# Patient Record
Sex: FEMALE | Race: WHITE | NOT HISPANIC OR LATINO | Employment: FULL TIME | URBAN - METROPOLITAN AREA
[De-identification: names, ages, dates, MRNs, and addresses within clinical notes are randomized per-mention and may not be internally consistent; named-entity substitution may affect disease eponyms.]

---

## 2018-02-12 ENCOUNTER — OFFICE VISIT (OUTPATIENT)
Dept: FAMILY MEDICINE CLINIC | Facility: CLINIC | Age: 28
End: 2018-02-12
Payer: COMMERCIAL

## 2018-02-12 VITALS
OXYGEN SATURATION: 97 % | DIASTOLIC BLOOD PRESSURE: 74 MMHG | WEIGHT: 172 LBS | HEIGHT: 68 IN | HEART RATE: 80 BPM | BODY MASS INDEX: 26.07 KG/M2 | SYSTOLIC BLOOD PRESSURE: 112 MMHG

## 2018-02-12 DIAGNOSIS — Z00.00 WELL ADULT EXAM: Primary | ICD-10-CM

## 2018-02-12 DIAGNOSIS — Z12.4 PAP SMEAR FOR CERVICAL CANCER SCREENING: ICD-10-CM

## 2018-02-12 PROBLEM — J45.20 MILD INTERMITTENT ASTHMA WITHOUT COMPLICATION: Chronic | Status: ACTIVE | Noted: 2018-02-12

## 2018-02-12 PROBLEM — R12 HEARTBURN SYMPTOM: Chronic | Status: ACTIVE | Noted: 2018-02-12

## 2018-02-12 PROCEDURE — 99385 PREV VISIT NEW AGE 18-39: CPT | Performed by: PHYSICIAN ASSISTANT

## 2018-02-12 RX ORDER — OMEPRAZOLE 10 MG/1
10 CAPSULE, DELAYED RELEASE ORAL DAILY
COMMUNITY
End: 2018-12-05

## 2018-02-12 NOTE — PROGRESS NOTES
Assessment/Plan:       Diagnoses and all orders for this visit:    Well adult exam  -     Comprehensive metabolic panel    Pap smear for cervical cancer screening    Other orders  -     omeprazole (PriLOSEC) 10 mg delayed release capsule; Take 10 mg by mouth daily            Subjective:      Patient ID: Liliam Lambetr is a 24 y o  female  Well Adult Physical   Patient here for a comprehensive physical exam The patient reports no problems    Do you take any herbs or supplements that were not prescribed by a doctor? no   Are you taking calcium supplements? no   Are you taking aspirin daily? no     History:  Any STD's in the past? None  Patient needs a Pap smear  She will make appointment in our office to do so  Patient has an IUD for birth control  She notes that she does not get any periods but she does have some cramping with the IUD  The following portions of the patient's history were reviewed and updated as appropriate:   She has no past medical history on file  ,   does not have any pertinent problems on file  ,   has no past surgical history on file  ,  family history includes Anxiety disorder in her maternal uncle; Asthma in her mother; Bipolar disorder in her mother; Cancer in her maternal grandfather; Depression in her maternal uncle and sister; Diabetes in her paternal aunt; Post-traumatic stress disorder in her mother  ,   reports that she has quit smoking  She has never used smokeless tobacco  She reports that she drinks alcohol  She reports that she uses drugs, including Marijuana  ,  has No Known Allergies     Current Outpatient Prescriptions   Medication Sig Dispense Refill    omeprazole (PriLOSEC) 10 mg delayed release capsule Take 10 mg by mouth daily      PROAIR  (90 Base) MCG/ACT inhaler inhale 1 puff by mouth every 6 hours if needed  0     No current facility-administered medications for this visit  Review of Systems   Constitutional: Negative  HENT: Negative      Eyes: Negative  Respiratory: Negative  Cardiovascular: Negative  Gastrointestinal: Negative  Endocrine: Negative  Genitourinary: Negative  Musculoskeletal: Negative  Allergic/Immunologic: Negative  Neurological: Negative  Hematological: Negative  Psychiatric/Behavioral: Negative  Objective:  Vitals:    02/12/18 1028   BP: 112/74   Pulse: 80   SpO2: 97%      Physical Exam   Constitutional: She is oriented to person, place, and time  She appears well-developed and well-nourished  HENT:   Head: Normocephalic and atraumatic  Right Ear: External ear normal    Left Ear: External ear normal    Nose: Nose normal    Mouth/Throat: Oropharynx is clear and moist    Eyes: Conjunctivae and EOM are normal  Pupils are equal, round, and reactive to light  No scleral icterus  Neck: Normal range of motion  Neck supple  No thyromegaly present  Cardiovascular: Normal rate, regular rhythm, normal heart sounds and intact distal pulses  No murmur heard  Pulmonary/Chest: Effort normal and breath sounds normal    Abdominal: Bowel sounds are normal  She exhibits no mass  Musculoskeletal: Normal range of motion  Lymphadenopathy:     She has no cervical adenopathy  Neurological: She is oriented to person, place, and time  She has normal reflexes  Skin: Skin is warm and dry  Psychiatric: She has a normal mood and affect   Her behavior is normal  Judgment and thought content normal

## 2018-02-12 NOTE — PATIENT INSTRUCTIONS

## 2018-06-05 ENCOUNTER — OFFICE VISIT (OUTPATIENT)
Dept: OBGYN CLINIC | Age: 28
End: 2018-06-05
Payer: COMMERCIAL

## 2018-06-05 VITALS
HEIGHT: 69 IN | DIASTOLIC BLOOD PRESSURE: 84 MMHG | BODY MASS INDEX: 24.88 KG/M2 | SYSTOLIC BLOOD PRESSURE: 128 MMHG | WEIGHT: 168 LBS

## 2018-06-05 DIAGNOSIS — Z11.3 SCREENING FOR STDS (SEXUALLY TRANSMITTED DISEASES): ICD-10-CM

## 2018-06-05 DIAGNOSIS — R10.2 PELVIC PAIN: ICD-10-CM

## 2018-06-05 DIAGNOSIS — Z30.432 ENCOUNTER FOR IUD REMOVAL: Primary | ICD-10-CM

## 2018-06-05 DIAGNOSIS — Z30.015 ENCOUNTER FOR INITIAL PRESCRIPTION OF VAGINAL RING HORMONAL CONTRACEPTIVE: ICD-10-CM

## 2018-06-05 PROCEDURE — 87591 N.GONORRHOEAE DNA AMP PROB: CPT | Performed by: NURSE PRACTITIONER

## 2018-06-05 PROCEDURE — 87491 CHLMYD TRACH DNA AMP PROBE: CPT | Performed by: NURSE PRACTITIONER

## 2018-06-05 PROCEDURE — 58301 REMOVE INTRAUTERINE DEVICE: CPT | Performed by: NURSE PRACTITIONER

## 2018-06-05 RX ORDER — ETONOGESTREL AND ETHINYL ESTRADIOL 11.7; 2.7 MG/1; MG/1
INSERT, EXTENDED RELEASE VAGINAL
Qty: 1 EACH | Refills: 2 | Status: SHIPPED | OUTPATIENT
Start: 2018-06-05 | End: 2018-10-18 | Stop reason: ALTCHOICE

## 2018-06-05 NOTE — PROGRESS NOTES
Iud removal  Date/Time: 6/5/2018 4:31 PM  Performed by: Leopoldo Ransom by: Jensen Whittaker     Consent:     Consent obtained:  Verbal (Discussed other forms of bcm and pt would like to try NR  Inserted one in office by herself  Will call if does well  Also agrees to STD testing bc hasn't been checked in 2 yrs  Same partner for 2 yrs  Still is adament about IUD being removed   ER visit 5/25)    Consent given by:  Patient    Procedure risks and benefits discussed: yes      Patient questions answered: yes      Patient agrees, verbalizes understanding, and wants to proceed: yes      Educational handouts given: no      Instructions and paperwork completed: yes    Procedure:     Removed with no complications: yes      Removal due to mechanical complications of IUD: no      Removal due to infection and inflammatory reaction: no      Other reason for removal:  Pelvic pain and cramps

## 2018-06-05 NOTE — PATIENT INSTRUCTIONS
Vaginal Ring   WHAT YOU NEED TO KNOW:   What is a vaginal ring? A vaginal ring is a small flexible ring that contains medicine  The vaginal ring may be used to prevent pregnancy  It may also be used to treat symptoms, such as vaginal dryness and itching, that occur with menopause  What do I need to know about the vaginal ring before I use it? You need to see your healthcare provider to get a prescription  Tell your healthcare provider if you are allergic to any medicine  You should not use this medicine if you are breastfeeding or you have vaginal bleeding that has not been checked by a doctor  You should not use the vaginal ring if you have certain medical conditions, such as breast cancer, endometrial cancer, or liver or heart disease  Other medical conditions include severe high blood pressure, circulation problems, and previous stroke or blood clot  You also should not use the vaginal ring if you are a heavy smoker and over the age of 28  Rarely, the vaginal ring may increase your risk for blood clots, heart attack, stroke, and toxic shock syndrome  How do I use my vaginal ring? Your healthcare provider will tell you when you can start using the vaginal ring  He will also show you how to put in your vaginal ring  You should receive another information sheet from your healthcare provider or pharmacist about this medicine  Read the information, and talk with your healthcare provider if you have questions  It is very important to use your vaginal ring exactly as directed  · If you are using the vaginal ring as birth control , keep it in place for 3 weeks in a row  Take your ring out for a 1-week break  When the week has gone by and you have probably had your menstrual period, place a new ring into your vagina  · If you are using the vaginal ring for menopause symptoms , keep the ring in place for 90 days (3 months)  After 3 months, take out your old ring and put in a new one    How do I insert my vaginal ring? · You may lie down, stand with one leg up, or squat when you insert the ring  Choose a position that feels best to you  · Wash your hands with soap and water  Dry your hands  Remove your vaginal ring from its pouch  · Press the sides of the ring together with your thumb and pointer finger  · Push the ring high up into your vagina like a tampon  · When your vaginal ring is in place, you should not feel anything  If you feel discomfort when the ring is in place, it may not be far enough inside your vagina  Gently use your finger to push the ring higher into your vagina  It is important that your vaginal ring is placed in the upper part of the vagina  · You do not have to worry that you will push the ring up too far or that it will get lost  Your cervix (the narrow, lower end of your uterus) will block the ring from going too high in your vagina  · If the ring slips down into your lower vagina, use your finger to push it back up  If the ring comes completely out, rinse it in warm (not hot) water and put it back in  How do I take out my vaginal ring? · Wash and dry your hands  · You may lie down, stand with one leg up, or squat when you remove the ring  Choose a position that feels best to you  · Use your finger to hook the ring and gently pull it out  Do not flush the old ring down the toilet  Throw the ring away where children or pets cannot get to it  When should I contact my healthcare provider? · You are bleeding from your vagina and you do not know why  · Your vaginal ring causes itching, pain, or burning  · You have stomach pain or tenderness, or you are vomiting  · You have trouble sleeping, feel tired, or your mood changes  · You have questions or concerns about your condition or care  When should I seek immediate care or call 911? · Your arm or leg feels warm, tender, and painful  It may look swollen and red      · You feel lightheaded, short of breath, and have chest pain  · You cough up blood  · You have any of the following signs of a stroke:      ¨ Numbness or drooping on one side of your face     ¨ Weakness in an arm or leg    ¨ Confusion or difficulty speaking    ¨ Dizziness, a severe headache, or vision loss  CARE AGREEMENT:   You have the right to help plan your care  Learn about your health condition and how it may be treated  Discuss treatment options with your caregivers to decide what care you want to receive  You always have the right to refuse treatment  The above information is an  only  It is not intended as medical advice for individual conditions or treatments  Talk to your doctor, nurse or pharmacist before following any medical regimen to see if it is safe and effective for you  © 2017 [address] [employer] Information is for End User's use only and may not be sold, redistributed or otherwise used for commercial purposes  All illustrations and images included in CareNotes® are the copyrighted property of [employer]  or [employer]

## 2018-06-07 LAB
CHLAMYDIA DNA CVX QL NAA+PROBE: NORMAL
N GONORRHOEA DNA GENITAL QL NAA+PROBE: NORMAL

## 2018-06-26 ENCOUNTER — OFFICE VISIT (OUTPATIENT)
Dept: FAMILY MEDICINE CLINIC | Facility: CLINIC | Age: 28
End: 2018-06-26
Payer: COMMERCIAL

## 2018-06-26 VITALS
HEART RATE: 92 BPM | OXYGEN SATURATION: 100 % | DIASTOLIC BLOOD PRESSURE: 68 MMHG | BODY MASS INDEX: 25.18 KG/M2 | WEIGHT: 170 LBS | TEMPERATURE: 97.8 F | HEIGHT: 69 IN | SYSTOLIC BLOOD PRESSURE: 106 MMHG

## 2018-06-26 DIAGNOSIS — F32.1 CURRENT MODERATE EPISODE OF MAJOR DEPRESSIVE DISORDER WITHOUT PRIOR EPISODE (HCC): Primary | ICD-10-CM

## 2018-06-26 DIAGNOSIS — F32.1 MAJOR DEPRESSIVE DISORDER, SINGLE EPISODE, MODERATE (HCC): ICD-10-CM

## 2018-06-26 PROCEDURE — 99213 OFFICE O/P EST LOW 20 MIN: CPT | Performed by: PHYSICIAN ASSISTANT

## 2018-06-26 RX ORDER — ESCITALOPRAM OXALATE 10 MG/1
10 TABLET ORAL DAILY
Qty: 30 TABLET | Refills: 1 | Status: SHIPPED | OUTPATIENT
Start: 2018-06-26 | End: 2018-08-10 | Stop reason: SDUPTHER

## 2018-06-26 NOTE — PATIENT INSTRUCTIONS
Depression   AMBULATORY CARE:   Depression  is a medical condition that causes feelings of sadness or hopelessness that do not go away  Depression may cause you to lose interest in things you used to enjoy  These feelings may interfere with your daily life  Common symptoms include the following:   · Appetite changes, or weight gain or loss    · Trouble going to sleep or staying asleep, or sleeping too much    · Fatigue or lack of energy    · Feeling restless, irritable, or withdrawn    · Feeling worthless, hopeless, discouraged, or guilty    · Trouble concentrating, remembering things, doing daily tasks, or making decisions    · Thoughts about hurting or killing yourself  Call 911 for any of the following:   · You think about harming yourself or someone else  Contact your healthcare provider if:   · Your symptoms do not improve  · You cannot make it to your next appointment  · You have new symptoms  · You have questions or concerns about your condition or care  Treatment for depression  may include medicine to improve or balance your mood  Therapy may also be used to treat your depression  A therapist will help you learn to cope with your thoughts and feelings  Therapy can be done alone or in a group  It may also be done with family members or a significant other  Self-care:   · Get regular physical activity  Try to exercise for 30 minutes, 3 to 5 days a week  Work with your healthcare provider to develop an exercise plan that you enjoy  Physical activity may improve your symptoms  · Get enough sleep  Create a routine to help you relax before bed  You can listen to music, read, or do yoga  Try to go to bed and wake up at the same time every day  Sleep is important for emotional health  · Eat a variety of healthy foods from all of the food groups  A healthy meal plan is low in fat, salt, and added sugar   Ask your healthcare provider for more information about a meal plan that is right for you      · Do not drink alcohol or use drugs  Alcohol and drugs can make your symptoms worse  Follow up with your healthcare provider as directed: Your healthcare provider will monitor your progress at follow-up visits  He or she will also monitor your medicine if you take antidepressants  Your healthcare provider will ask if the medicine is helping  Tell him or her about any side effects or problems you may have with your medicine  The type or amount of medicine may need to be changed  Write down your questions so you remember to ask them during your visits  © 2017 [employer] Information is for End User's use only and may not be sold, redistributed or otherwise used for commercial purposes  All illustrations and images included in CareNotes® are the copyrighted property of [employer]  or [employer]  The above information is an  only  It is not intended as medical advice for individual conditions or treatments  Talk to your doctor, nurse or pharmacist before following any medical regimen to see if it is safe and effective for you

## 2018-06-26 NOTE — PROGRESS NOTES
Assessment/Plan:       Diagnoses and all orders for this visit:    Current moderate episode of major depressive disorder without prior episode ([facility] [address] )  -     CBC and differential; Future  -     Comprehensive metabolic panel; Future  -     TSH, 3rd generation with T4 reflex; Future  -     Vitamin D 25 hydroxy; Future  -     Vitamin B12 level; Future  -     escitalopram (LEXAPRO) 10 mg tablet; Take 1 tablet (10 mg total) by mouth daily    Major depressive disorder, single episode, moderate (HCC)  -     CBC and differential; Future  -     Comprehensive metabolic panel; Future  -     TSH, 3rd generation with T4 reflex; Future  -     Vitamin D 25 hydroxy; Future  -     Vitamin B12 level; Future            Subjective:      Patient ID: Danni Del Rosario is a 24 y o  female  Patient is here today complaining of not feeling well  She states it is more mental feeling physical feeling  She says it is difficult for her to try and described  She notes that she is tired all the time and sleeps a lot  She does not want to do her daily activities  Her appetite has been decreased  She notes she has been more irritable with people and she does not know why  She states she gets very emotional and cries for no reason at all  Patient denies wanting to hurt herself, suicidal thoughts or wanting to hurt anyone else  The following portions of the patient's history were reviewed and updated as appropriate:   She has a past medical history of Asthma ,   does not have any pertinent problems on file  ,   has a past surgical history that includes No past surgeries  ,  family history includes Anxiety disorder in her maternal uncle; Asthma in her mother; Bipolar disorder in her mother; Breast cancer in her family; Cancer in her maternal grandfather; Depression in her maternal uncle and sister; Diabetes in her paternal aunt; Post-traumatic stress disorder in her mother  ,   reports that she has been smoking    She has never used smokeless tobacco  She reports that she drinks alcohol  She reports that she uses drugs, including Marijuana  ,  has No Known Allergies     Current Outpatient Prescriptions   Medication Sig Dispense Refill    etonogestrel-ethinyl estradiol (NUVARING) 0 12-0 015 MG/24HR vaginal ring Insert vaginally and leave in place for 3 consecutive weeks, then remove for 1 week  1 each 2    omeprazole (PriLOSEC) 10 mg delayed release capsule Take 10 mg by mouth daily      PROAIR  (90 Base) MCG/ACT inhaler inhale 1 puff by mouth every 6 hours if needed  0    escitalopram (LEXAPRO) 10 mg tablet Take 1 tablet (10 mg total) by mouth daily 30 tablet 1     No current facility-administered medications for this visit  Review of Systems   Constitutional: Positive for activity change, appetite change and fatigue  Negative for chills, diaphoresis and fever  HENT: Negative  Respiratory: Negative  Cardiovascular: Negative  Gastrointestinal: Negative  Endocrine: Negative  Genitourinary: Negative  Musculoskeletal: Negative  Allergic/Immunologic: Negative  Neurological: Negative  Hematological: Negative  Psychiatric/Behavioral: Positive for agitation, dysphoric mood and sleep disturbance  Negative for behavioral problems, confusion, decreased concentration, hallucinations, self-injury and suicidal ideas  The patient is not nervous/anxious and is not hyperactive  Objective:  Vitals:    06/26/18 1143   BP: 106/68   Pulse: 92   Temp: 97 8 °F (36 6 °C)   SpO2: 100%   Weight: 77 1 kg (170 lb)   Height: 5' 8 5" (1 74 m)     Body mass index is 25 47 kg/m²  Physical Exam   Constitutional: She is oriented to person, place, and time  She appears well-developed and well-nourished  HENT:   Head: Normocephalic  Left Ear: External ear normal    Eyes: Conjunctivae and EOM are normal  Pupils are equal, round, and reactive to light  Neck: Normal range of motion  Neck supple     Cardiovascular: Normal rate, regular rhythm, normal heart sounds and intact distal pulses  Pulmonary/Chest: Effort normal and breath sounds normal    Musculoskeletal: Normal range of motion  Lymphadenopathy:     She has no cervical adenopathy  Neurological: She is alert and oriented to person, place, and time  Skin: Skin is warm and dry  Psychiatric: Her speech is normal  Judgment and thought content normal  Her mood appears not anxious  Her affect is not angry and not inappropriate  She is withdrawn  Cognition and memory are normal  She exhibits a depressed mood  Nursing note and vitals reviewed

## 2018-07-07 ENCOUNTER — APPOINTMENT (OUTPATIENT)
Dept: LAB | Facility: CLINIC | Age: 28
End: 2018-07-07
Payer: COMMERCIAL

## 2018-07-07 DIAGNOSIS — F32.1 CURRENT MODERATE EPISODE OF MAJOR DEPRESSIVE DISORDER WITHOUT PRIOR EPISODE (HCC): ICD-10-CM

## 2018-07-07 DIAGNOSIS — F32.1 MAJOR DEPRESSIVE DISORDER, SINGLE EPISODE, MODERATE (HCC): ICD-10-CM

## 2018-07-07 LAB
25(OH)D3 SERPL-MCNC: 16.7 NG/ML (ref 30–100)
ALBUMIN SERPL BCP-MCNC: 4 G/DL (ref 3.5–5)
ALP SERPL-CCNC: 63 U/L (ref 46–116)
ALT SERPL W P-5'-P-CCNC: 17 U/L (ref 12–78)
ANION GAP SERPL CALCULATED.3IONS-SCNC: 3 MMOL/L (ref 4–13)
AST SERPL W P-5'-P-CCNC: 9 U/L (ref 5–45)
BASOPHILS # BLD AUTO: 0.05 THOUSANDS/ΜL (ref 0–0.1)
BASOPHILS NFR BLD AUTO: 1 % (ref 0–1)
BILIRUB SERPL-MCNC: 0.68 MG/DL (ref 0.2–1)
BUN SERPL-MCNC: 16 MG/DL (ref 5–25)
CALCIUM SERPL-MCNC: 8.8 MG/DL (ref 8.3–10.1)
CHLORIDE SERPL-SCNC: 106 MMOL/L (ref 100–108)
CO2 SERPL-SCNC: 29 MMOL/L (ref 21–32)
CREAT SERPL-MCNC: 0.84 MG/DL (ref 0.6–1.3)
EOSINOPHIL # BLD AUTO: 0.11 THOUSAND/ΜL (ref 0–0.61)
EOSINOPHIL NFR BLD AUTO: 2 % (ref 0–6)
ERYTHROCYTE [DISTWIDTH] IN BLOOD BY AUTOMATED COUNT: 12.5 % (ref 11.6–15.1)
GFR SERPL CREATININE-BSD FRML MDRD: 97 ML/MIN/1.73SQ M
GLUCOSE P FAST SERPL-MCNC: 88 MG/DL (ref 65–99)
HCT VFR BLD AUTO: 38.9 % (ref 34.8–46.1)
HGB BLD-MCNC: 12.7 G/DL (ref 11.5–15.4)
IMM GRANULOCYTES # BLD AUTO: 0.01 THOUSAND/UL (ref 0–0.2)
IMM GRANULOCYTES NFR BLD AUTO: 0 % (ref 0–2)
LYMPHOCYTES # BLD AUTO: 2.71 THOUSANDS/ΜL (ref 0.6–4.47)
LYMPHOCYTES NFR BLD AUTO: 58 % (ref 14–44)
MCH RBC QN AUTO: 28.9 PG (ref 26.8–34.3)
MCHC RBC AUTO-ENTMCNC: 32.6 G/DL (ref 31.4–37.4)
MCV RBC AUTO: 89 FL (ref 82–98)
MONOCYTES # BLD AUTO: 0.24 THOUSAND/ΜL (ref 0.17–1.22)
MONOCYTES NFR BLD AUTO: 5 % (ref 4–12)
NEUTROPHILS # BLD AUTO: 1.58 THOUSANDS/ΜL (ref 1.85–7.62)
NEUTS SEG NFR BLD AUTO: 34 % (ref 43–75)
NRBC BLD AUTO-RTO: 0 /100 WBCS
PLATELET # BLD AUTO: 250 THOUSANDS/UL (ref 149–390)
PMV BLD AUTO: 10 FL (ref 8.9–12.7)
POTASSIUM SERPL-SCNC: 4.1 MMOL/L (ref 3.5–5.3)
PROT SERPL-MCNC: 6.9 G/DL (ref 6.4–8.2)
RBC # BLD AUTO: 4.39 MILLION/UL (ref 3.81–5.12)
SODIUM SERPL-SCNC: 138 MMOL/L (ref 136–145)
TSH SERPL DL<=0.05 MIU/L-ACNC: 0.57 UIU/ML (ref 0.36–3.74)
VIT B12 SERPL-MCNC: 281 PG/ML (ref 100–900)
WBC # BLD AUTO: 4.7 THOUSAND/UL (ref 4.31–10.16)

## 2018-07-07 PROCEDURE — 85025 COMPLETE CBC W/AUTO DIFF WBC: CPT

## 2018-07-07 PROCEDURE — 84443 ASSAY THYROID STIM HORMONE: CPT

## 2018-07-07 PROCEDURE — 82607 VITAMIN B-12: CPT

## 2018-07-07 PROCEDURE — 36415 COLL VENOUS BLD VENIPUNCTURE: CPT | Performed by: PHYSICIAN ASSISTANT

## 2018-07-07 PROCEDURE — 80053 COMPREHEN METABOLIC PANEL: CPT | Performed by: PHYSICIAN ASSISTANT

## 2018-07-07 PROCEDURE — 82306 VITAMIN D 25 HYDROXY: CPT

## 2018-07-10 ENCOUNTER — OFFICE VISIT (OUTPATIENT)
Dept: FAMILY MEDICINE CLINIC | Facility: CLINIC | Age: 28
End: 2018-07-10
Payer: COMMERCIAL

## 2018-07-10 VITALS
HEART RATE: 84 BPM | DIASTOLIC BLOOD PRESSURE: 82 MMHG | SYSTOLIC BLOOD PRESSURE: 116 MMHG | WEIGHT: 169.2 LBS | BODY MASS INDEX: 25.06 KG/M2 | HEIGHT: 69 IN | OXYGEN SATURATION: 99 % | TEMPERATURE: 98.6 F

## 2018-07-10 DIAGNOSIS — F32.1 CURRENT MODERATE EPISODE OF MAJOR DEPRESSIVE DISORDER WITHOUT PRIOR EPISODE (HCC): Primary | ICD-10-CM

## 2018-07-10 PROCEDURE — 99212 OFFICE O/P EST SF 10 MIN: CPT | Performed by: PHYSICIAN ASSISTANT

## 2018-07-10 NOTE — PROGRESS NOTES
Assessment/Plan:       Diagnoses and all orders for this visit:    Current moderate episode of major depressive disorder without prior episode [facility])        Patient is to see Dr Karel Bravo psychologist     Subjective:      Patient ID: Vladimir Valdez is a 24 y o  female  Patient is here for follow-up of starting her Lexapro for her depression  She notes that it has helped a little bit  She did have some side effects of nausea and inability to sleep in the beginning but those have resolved  She feels she has another type of psych problem  She feels as though she may have some type of autistic disorder  I have recommended patient follow up with Dr Mariel sifuentes for psychological testing at this time  He and his partners do this and I would feel more comfortable if they would follow her for testing and make any recommendations  Patient is agreeable to this  The following portions of the patient's history were reviewed and updated as appropriate:   She has a past medical history of Asthma ,   does not have any pertinent problems on file  ,   has a past surgical history that includes No past surgeries  ,  family history includes Anxiety disorder in her maternal uncle; Asthma in her mother; Bipolar disorder in her mother; Breast cancer in her family; Cancer in her maternal grandfather; Depression in her maternal uncle and sister; Diabetes in her paternal aunt; Post-traumatic stress disorder in her mother  ,   reports that she has been smoking  She has never used smokeless tobacco  She reports that she drinks alcohol  She reports that she uses drugs, including Marijuana  ,  has No Known Allergies     Current Outpatient Prescriptions   Medication Sig Dispense Refill    escitalopram (LEXAPRO) 10 mg tablet Take 1 tablet (10 mg total) by mouth daily 30 tablet 1    etonogestrel-ethinyl estradiol (NUVARING) 0 12-0 015 MG/24HR vaginal ring Insert vaginally and leave in place for 3 consecutive weeks, then remove for 1 week  1 each 2    omeprazole (PriLOSEC) 10 mg delayed release capsule Take 10 mg by mouth daily      PROAIR  (90 Base) MCG/ACT inhaler inhale 1 puff by mouth every 6 hours if needed  0     No current facility-administered medications for this visit  Review of Systems   Constitutional: Negative  Endocrine: Negative  Neurological: Negative  Psychiatric/Behavioral: Positive for decreased concentration, dysphoric mood and sleep disturbance  Negative for self-injury and suicidal ideas  Objective:  Vitals:    07/10/18 1508   BP: 116/82   Pulse: 84   Temp: 98 6 °F (37 °C)   SpO2: 99%   Weight: 76 7 kg (169 lb 3 2 oz)   Height: 5' 8 5" (1 74 m)     Body mass index is 25 35 kg/m²  Physical Exam   Constitutional: She is oriented to person, place, and time  She appears well-developed and well-nourished  HENT:   Head: Normocephalic  Neurological: She is alert and oriented to person, place, and time  Psychiatric: Her speech is normal and behavior is normal  Judgment and thought content normal  Her affect is blunt  Cognition and memory are normal    Nursing note and vitals reviewed

## 2018-08-10 ENCOUNTER — OFFICE VISIT (OUTPATIENT)
Dept: FAMILY MEDICINE CLINIC | Facility: CLINIC | Age: 28
End: 2018-08-10
Payer: COMMERCIAL

## 2018-08-10 VITALS
TEMPERATURE: 98.5 F | HEART RATE: 94 BPM | OXYGEN SATURATION: 99 % | DIASTOLIC BLOOD PRESSURE: 62 MMHG | SYSTOLIC BLOOD PRESSURE: 124 MMHG | BODY MASS INDEX: 25.77 KG/M2 | HEIGHT: 69 IN | WEIGHT: 174 LBS

## 2018-08-10 DIAGNOSIS — R30.0 DYSURIA: ICD-10-CM

## 2018-08-10 DIAGNOSIS — F32.1 CURRENT MODERATE EPISODE OF MAJOR DEPRESSIVE DISORDER WITHOUT PRIOR EPISODE (HCC): Primary | ICD-10-CM

## 2018-08-10 DIAGNOSIS — F84.0 HIGH-FUNCTIONING AUTISM SPECTRUM DISORDER: ICD-10-CM

## 2018-08-10 LAB
SL AMB  POCT GLUCOSE, UA: NEGATIVE
SL AMB LEUKOCYTE ESTERASE,UA: NORMAL
SL AMB POCT BILIRUBIN,UA: NEGATIVE
SL AMB POCT BLOOD,UA: NORMAL
SL AMB POCT CLARITY,UA: CLEAR
SL AMB POCT COLOR,UA: YELLOW
SL AMB POCT KETONES,UA: NORMAL
SL AMB POCT NITRITE,UA: NEGATIVE
SL AMB POCT PH,UA: 6.5
SL AMB POCT SPECIFIC GRAVITY,UA: 1.02
SL AMB POCT URINE PROTEIN: NORMAL
SL AMB POCT UROBILINOGEN: NEGATIVE

## 2018-08-10 PROCEDURE — 81002 URINALYSIS NONAUTO W/O SCOPE: CPT | Performed by: PHYSICIAN ASSISTANT

## 2018-08-10 PROCEDURE — 87086 URINE CULTURE/COLONY COUNT: CPT | Performed by: PHYSICIAN ASSISTANT

## 2018-08-10 PROCEDURE — 99212 OFFICE O/P EST SF 10 MIN: CPT | Performed by: PHYSICIAN ASSISTANT

## 2018-08-10 PROCEDURE — 87186 SC STD MICRODIL/AGAR DIL: CPT | Performed by: PHYSICIAN ASSISTANT

## 2018-08-10 PROCEDURE — 87077 CULTURE AEROBIC IDENTIFY: CPT | Performed by: PHYSICIAN ASSISTANT

## 2018-08-10 RX ORDER — NITROFURANTOIN 25; 75 MG/1; MG/1
100 CAPSULE ORAL 2 TIMES DAILY
Qty: 10 CAPSULE | Refills: 0 | Status: SHIPPED | OUTPATIENT
Start: 2018-08-10 | End: 2018-08-15

## 2018-08-10 RX ORDER — ESCITALOPRAM OXALATE 10 MG/1
20 TABLET ORAL DAILY
Qty: 30 TABLET | Refills: 0
Start: 2018-08-10 | End: 2018-08-20 | Stop reason: SDUPTHER

## 2018-08-10 NOTE — PROGRESS NOTES
Assessment/Plan:       Diagnoses and all orders for this visit:    Current moderate episode of major depressive disorder without prior episode (HCC)  -     escitalopram (LEXAPRO) 10 mg tablet; Take 2 tablets (20 mg total) by mouth daily    Dysuria  -     nitrofurantoin (MACROBID) 100 mg capsule; Take 1 capsule (100 mg total) by mouth 2 (two) times a day for 5 days    High-functioning autism spectrum disorder            Subjective:      Patient ID: Tamia Rose is a 24 y o  female  Patient is here for follow-up on her medication for depression  She feels that has helped some but does not feel she is where she needs to be  She is seeing a counselor and has been diagnosed with high functioning autism  She will be working with a counselor on coping mechanisms  She also tells me that she has been doing some self injuring  She states she will hit herself on the head or on the hips at times  She states it has not worsened or improved on the medication at this point  She is also having some frequency of urination and an odor to her urine  The following portions of the patient's history were reviewed and updated as appropriate:   She has a past medical history of Asthma ,   does not have any pertinent problems on file  ,   has a past surgical history that includes No past surgeries  ,  family history includes Anxiety disorder in her maternal uncle; Asthma in her mother; Bipolar disorder in her mother; Breast cancer in her family; Cancer in her maternal grandfather; Depression in her maternal uncle and sister; Diabetes in her paternal aunt; Post-traumatic stress disorder in her mother  ,   reports that she has been smoking  She has never used smokeless tobacco  She reports that she drinks alcohol  She reports that she uses drugs, including Marijuana  ,  has No Known Allergies     Current Outpatient Prescriptions   Medication Sig Dispense Refill    escitalopram (LEXAPRO) 10 mg tablet Take 2 tablets (20 mg total) by mouth daily 30 tablet 0    etonogestrel-ethinyl estradiol (NUVARING) 0 12-0 015 MG/24HR vaginal ring Insert vaginally and leave in place for 3 consecutive weeks, then remove for 1 week  1 each 2    omeprazole (PriLOSEC) 10 mg delayed release capsule Take 10 mg by mouth daily      PROAIR  (90 Base) MCG/ACT inhaler inhale 1 puff by mouth every 6 hours if needed  0    nitrofurantoin (MACROBID) 100 mg capsule Take 1 capsule (100 mg total) by mouth 2 (two) times a day for 5 days 10 capsule 0     No current facility-administered medications for this visit  Review of Systems   Cardiovascular: Negative for chest pain  Psychiatric/Behavioral: Positive for behavioral problems, dysphoric mood and self-injury  Negative for suicidal ideas  The patient is not nervous/anxious  Objective:  Vitals:    08/10/18 1333   BP: 124/62   Pulse: 94   Temp: 98 5 °F (36 9 °C)   SpO2: 99%   Weight: 78 9 kg (174 lb)   Height: 5' 8 5" (1 74 m)     Body mass index is 26 07 kg/m²  Physical Exam   Constitutional: She is oriented to person, place, and time  She appears well-developed and well-nourished  HENT:   Head: Normocephalic  Neurological: She is alert and oriented to person, place, and time  Psychiatric: She has a normal mood and affect   Her behavior is normal  Judgment and thought content normal

## 2018-08-13 LAB — BACTERIA UR CULT: ABNORMAL

## 2018-08-20 DIAGNOSIS — F32.1 CURRENT MODERATE EPISODE OF MAJOR DEPRESSIVE DISORDER WITHOUT PRIOR EPISODE (HCC): ICD-10-CM

## 2018-08-20 RX ORDER — ESCITALOPRAM OXALATE 10 MG/1
20 TABLET ORAL DAILY
Qty: 30 TABLET | Refills: 5 | Status: SHIPPED | OUTPATIENT
Start: 2018-08-20 | End: 2018-09-05 | Stop reason: SDUPTHER

## 2018-09-05 ENCOUNTER — OFFICE VISIT (OUTPATIENT)
Dept: OBGYN CLINIC | Age: 28
End: 2018-09-05
Payer: COMMERCIAL

## 2018-09-05 ENCOUNTER — OFFICE VISIT (OUTPATIENT)
Dept: FAMILY MEDICINE CLINIC | Facility: CLINIC | Age: 28
End: 2018-09-05
Payer: COMMERCIAL

## 2018-09-05 VITALS
SYSTOLIC BLOOD PRESSURE: 120 MMHG | HEIGHT: 68 IN | DIASTOLIC BLOOD PRESSURE: 64 MMHG | BODY MASS INDEX: 26.37 KG/M2 | WEIGHT: 174 LBS

## 2018-09-05 VITALS
BODY MASS INDEX: 25.77 KG/M2 | WEIGHT: 174 LBS | SYSTOLIC BLOOD PRESSURE: 124 MMHG | HEART RATE: 79 BPM | HEIGHT: 69 IN | OXYGEN SATURATION: 99 % | TEMPERATURE: 98 F | DIASTOLIC BLOOD PRESSURE: 72 MMHG

## 2018-09-05 DIAGNOSIS — F32.1 CURRENT MODERATE EPISODE OF MAJOR DEPRESSIVE DISORDER WITHOUT PRIOR EPISODE (HCC): ICD-10-CM

## 2018-09-05 DIAGNOSIS — Z30.09 BIRTH CONTROL COUNSELING: Primary | ICD-10-CM

## 2018-09-05 PROCEDURE — 99212 OFFICE O/P EST SF 10 MIN: CPT | Performed by: NURSE PRACTITIONER

## 2018-09-05 PROCEDURE — 3008F BODY MASS INDEX DOCD: CPT | Performed by: PHYSICIAN ASSISTANT

## 2018-09-05 PROCEDURE — 99212 OFFICE O/P EST SF 10 MIN: CPT | Performed by: PHYSICIAN ASSISTANT

## 2018-09-05 RX ORDER — ESCITALOPRAM OXALATE 20 MG/1
20 TABLET ORAL DAILY
Qty: 30 TABLET | Refills: 5 | Status: SHIPPED | OUTPATIENT
Start: 2018-09-05 | End: 2018-12-05 | Stop reason: SDUPTHER

## 2018-09-05 NOTE — PROGRESS NOTES
Assessment/Plan:      Diagnoses and all orders for this visit:    Birth control counseling      Call as needed, encouraged calcium/vit D in her diet, all questions answered, will call her when approved for nexplanon  Subjective:     Patient ID: Maggy Manuel is a 24 y o  female  Pleasant 24 y o  here for discussion of birth control methods  She does not like the ring which she feels during sex and she has more discharge with it  GC/CT negative 6/2018, same partner  Discussion of all birth control methods took place and patient would like to start Nexplanon  Agrees to stay on NR until implant is placed  Risks, benefits and side effects of methods were discussed  Review of Systems   Constitutional: Negative for activity change, chills, fatigue, fever and unexpected weight change  HENT: Negative for mouth sores and trouble swallowing  Respiratory: Negative for shortness of breath  Gastrointestinal: Negative for anal bleeding, blood in stool, constipation and diarrhea  Genitourinary: Negative for difficulty urinating, dysuria, genital sores and hematuria  Neurological: Negative for weakness  Psychiatric/Behavioral: Negative for confusion and self-injury  Objective:     Physical Exam   Constitutional: She is oriented to person, place, and time  She appears well-developed and well-nourished  No distress  HENT:   Head: Normocephalic  Eyes: EOM are normal    Neck: Normal range of motion  Pulmonary/Chest: Effort normal  No respiratory distress  Musculoskeletal: Normal range of motion  Neurological: She is alert and oriented to person, place, and time  Skin: Skin is warm and dry  Psychiatric: She has a normal mood and affect

## 2018-09-05 NOTE — PROGRESS NOTES
Assessment/Plan:       Diagnoses and all orders for this visit:    Current moderate episode of major depressive disorder without prior episode (HCC)  -     escitalopram (LEXAPRO) 20 mg tablet; Take 1 tablet (20 mg total) by mouth daily            Subjective:      Patient ID: Brittnee Gee is a 24 y o  female  Patient is here for follow-up on her Lexapro  She states she is doing better now that we have increased her dose to 20 mg  She needs her medication renewed at this time at 20 mg  Patient is still seeing a therapist and feels this is also helping  The following portions of the patient's history were reviewed and updated as appropriate:   She has a past medical history of Asthma ,   does not have any pertinent problems on file  ,   has a past surgical history that includes No past surgeries  ,  family history includes Anxiety disorder in her maternal uncle; Asthma in her mother; Bipolar disorder in her mother; Breast cancer in her family; Cancer in her maternal grandfather; Depression in her maternal uncle and sister; Diabetes in her paternal aunt; Post-traumatic stress disorder in her mother  ,   reports that she has been smoking  She has never used smokeless tobacco  She reports that she drinks alcohol  She reports that she uses drugs, including Marijuana  ,  has No Known Allergies     Current Outpatient Prescriptions   Medication Sig Dispense Refill    escitalopram (LEXAPRO) 20 mg tablet Take 1 tablet (20 mg total) by mouth daily 30 tablet 5    etonogestrel-ethinyl estradiol (NUVARING) 0 12-0 015 MG/24HR vaginal ring Insert vaginally and leave in place for 3 consecutive weeks, then remove for 1 week  1 each 2    PROAIR  (90 Base) MCG/ACT inhaler inhale 1 puff by mouth every 6 hours if needed  0    omeprazole (PriLOSEC) 10 mg delayed release capsule Take 10 mg by mouth daily       No current facility-administered medications for this visit          Review of Systems   Constitutional: Negative for appetite change and fatigue  Respiratory: Negative for shortness of breath  Cardiovascular: Negative for chest pain  Gastrointestinal: Negative for nausea  Neurological: Negative for dizziness  Psychiatric/Behavioral: Positive for dysphoric mood  Negative for decreased concentration and sleep disturbance  The patient is not nervous/anxious  Objective:  Vitals:    09/05/18 1428   BP: 124/72   Pulse: 79   Temp: 98 °F (36 7 °C)   SpO2: 99%   Weight: 78 9 kg (174 lb)   Height: 5' 8 5" (1 74 m)     Body mass index is 26 07 kg/m²  Physical Exam   Constitutional: She is oriented to person, place, and time  She appears well-developed and well-nourished  HENT:   Head: Normocephalic  Neurological: She is alert and oriented to person, place, and time  Psychiatric: She has a normal mood and affect  Her behavior is normal    Nursing note and vitals reviewed

## 2018-09-05 NOTE — PATIENT INSTRUCTIONS
Etonogestrel (Implant)   Etonogestrel (e-toe-olvin-NEO-trel)  Prevents pregnancy  Brand Name(s): Nexplanon   There may be other brand names for this medicine  When This Medicine Should Not Be Used: This medicine is not right for everyone  You should not receive it if you had an allergic reaction to etonogestrel, or if you are pregnant  Do not use it if you have breast cancer, heart disease, liver disease, or a history of blood clots (such as deep vein thrombosis, pulmonary embolism, or stroke)  How to Use This Medicine:   Implant  · A nurse or other trained health professional will give you this medicine  · This medicine is an implant  It will be surgically placed under the skin of your upper, inner arm  · Gently press your fingertips over the skin where this medicine was inserted  You should be able to feel the implant  · You might have to use another form of birth control for 7 days after the implant is inserted  Your doctor will tell you if this is needed  · Your doctor can remove the implant at any time if you want to stop using this medicine  The implant must be removed after 3 years, but you may have a new one inserted if you still want to use this form of birth control  · Read and follow the patient instructions that come with this medicine  Talk to your doctor or pharmacist if you have any questions  Drugs and Foods to Avoid:   Ask your doctor or pharmacist before using any other medicine, including over-the-counter medicines, vitamins, and herbal products  · Some foods and medicines can affect how etonogestrel works  Tell your doctor if you are using any of the following:  ¨ Bosentan, carbamazepine, cyclosporine, felbamate, griseofulvin, itraconazole, ketoconazole, lamotrigine, oxcarbazepine, phenobarbital, phenytoin, rifampin, Rosio wort, topiramate  ¨ Medicine for HIV/AIDS  Warnings While Using This Medicine:   · Tell your doctor right away if you think you become pregnant   The implant will need to be removed  · Tell your doctor if you have cancer, blood circulation problems, high blood pressure, or kidney disease, or if you smoke  Tell your doctor if you are breastfeeding, or if you have recently given birth  Also tell your doctor if you have diabetes or prediabetes, high cholesterol, or a history of depression  · This medicine may cause the following problems:  ¨ Ectopic (tubal) pregnancy  ¨ Cysts in the ovaries  ¨ Possible risk of breast cancer  ¨ Higher risk of heart attack, stroke, or blood clots  ¨ Liver cancers or tumors  ¨ High blood pressure  · This medicine may change your usual menstrual cycle  You might have irregular bleeding, or your periods may be lighter, shorter, heavier, or longer  You might not have a period in some cycles  However, call your doctor if you think you are pregnant or if you have severe pain or changes that worry you  · This medicine will not protect you from HIV/AIDS or other sexually transmitted diseases  · You might need to have the implant removed if you will be inactive for a period of time, such as after major surgery, because of the risk of blood clots  · Tell any doctor or dentist who treats you that you are using this medicine  This medicine may affect certain medical test results  · Your doctor will check your progress and the effects of this medicine at regular visits  Keep all appointments  · Keep all medicine out of the reach of children  Never share your medicine with anyone    Possible Side Effects While Using This Medicine:   Call your doctor right away if you notice any of these side effects:  · Allergic reaction: Itching or hives, swelling in your face or hands, swelling or tingling in your mouth or throat, chest tightness, trouble breathing  · Chest pain, trouble breathing, or coughing up blood  · Dark urine or pale stools, nausea, vomiting, loss of appetite, stomach pain, yellow skin or eyes  · Double vision or other trouble seeing  · Numbness or weakness on one side of your body, sudden or severe headache, problems with vision, speech, or walking  · Pain in your lower leg (calf)  · Severe or ongoing pain, tingling, bleeding, bruising, redness, itching, or swelling where the implant is placed  · Unusual or severe pain in your abdomen  · Unusual or unexpected vaginal bleeding or heavy bleeding  If you notice these less serious side effects, talk with your doctor:   · Acne or pimples  · Mild headache  · Mild pain, tingling, bleeding, bruising, redness, itching, or swelling where the implant is placed  · Mood changes  · Weight gain  If you notice other side effects that you think are caused by this medicine, tell your doctor  Call your doctor for medical advice about side effects  You may report side effects to FDA at 2-506-FDA-6277  © 2017 [address] [employer] Information is for End User's use only and may not be sold, redistributed or otherwise used for commercial purposes  The above information is an  only  It is not intended as medical advice for individual conditions or treatments  Talk to your doctor, nurse or pharmacist before following any medical regimen to see if it is safe and effective for you

## 2018-09-10 ENCOUNTER — TELEPHONE (OUTPATIENT)
Dept: OBGYN CLINIC | Age: 28
End: 2018-09-10

## 2018-09-11 ENCOUNTER — TELEPHONE (OUTPATIENT)
Dept: OBGYN CLINIC | Facility: MEDICAL CENTER | Age: 28
End: 2018-09-11

## 2018-09-11 NOTE — TELEPHONE ENCOUNTER
Sorry, yes, it should be scheduled with Chelsea but on a day that RAMAKRISHNA is there in the office so that she can be present for the signing of the consent  Thanks!

## 2018-09-11 NOTE — TELEPHONE ENCOUNTER
Buy and bill Nexplanon labeled and will be brought to [place] office  Pt can be scheduled in [place] for a day when KTM is present for insertion, at any time as pt is on Nuvaring currently

## 2018-09-21 NOTE — TELEPHONE ENCOUNTER
Called patient and notified her that her Aidan Mckeon is in the office and to call back and schedule appointment

## 2018-09-28 ENCOUNTER — TELEPHONE (OUTPATIENT)
Dept: OBGYN CLINIC | Age: 28
End: 2018-09-28

## 2018-09-28 NOTE — TELEPHONE ENCOUNTER
Lm for patient advising [facility] and Bev [facility]   Called to make an appt for patient advised to call back

## 2018-10-18 ENCOUNTER — OFFICE VISIT (OUTPATIENT)
Dept: FAMILY MEDICINE CLINIC | Facility: CLINIC | Age: 28
End: 2018-10-18
Payer: COMMERCIAL

## 2018-10-18 VITALS
SYSTOLIC BLOOD PRESSURE: 128 MMHG | DIASTOLIC BLOOD PRESSURE: 82 MMHG | HEIGHT: 68 IN | BODY MASS INDEX: 27.58 KG/M2 | TEMPERATURE: 98.6 F | HEART RATE: 71 BPM | WEIGHT: 182 LBS | OXYGEN SATURATION: 98 %

## 2018-10-18 DIAGNOSIS — J01.90 ACUTE SINUSITIS, RECURRENCE NOT SPECIFIED, UNSPECIFIED LOCATION: Primary | ICD-10-CM

## 2018-10-18 PROCEDURE — 99213 OFFICE O/P EST LOW 20 MIN: CPT | Performed by: FAMILY MEDICINE

## 2018-10-18 PROCEDURE — 3008F BODY MASS INDEX DOCD: CPT | Performed by: FAMILY MEDICINE

## 2018-10-18 RX ORDER — AMOXICILLIN AND CLAVULANATE POTASSIUM 875; 125 MG/1; MG/1
1 TABLET, FILM COATED ORAL EVERY 12 HOURS SCHEDULED
Qty: 20 TABLET | Refills: 0 | Status: SHIPPED | OUTPATIENT
Start: 2018-10-18 | End: 2018-10-28

## 2018-10-18 NOTE — PROGRESS NOTES
Assessment/Plan:         Diagnoses and all orders for this visit:    Acute sinusitis, recurrence not specified, unspecified location  -     amoxicillin-clavulanate (AUGMENTIN) 875-125 mg per tablet; Take 1 tablet by mouth every 12 (twelve) hours for 10 days        Sinusitis  discussed plan of care , rec preventative measures , if past issues with seasonal allergens rec aggressive treatment , waldo pot , nasal rinse , if no history of htn / cad  consider otc  decongestant for short term treatment , muccinex otc  consider the use of nasal steroid short term  Encouraged smoking cessation      Subjective:      Patient ID: Roro Hernandez is a 24 y o  female  Complaining of some dizziness, left-sided facial pain, some ear complaints left, negative fever chills, continues to smoke, cough slightly productive more postnasal drip negative wheezing chills  ,  Continues to take medications in regard to anxiety /depression has not started taking vitamin-D supplementation as previously told ,           The following portions of the patient's history were reviewed and updated as appropriate:   She has a past medical history of Asthma ,   does not have any pertinent problems on file  ,   has a past surgical history that includes No past surgeries  ,  family history includes Anxiety disorder in her maternal uncle; Asthma in her mother; Bipolar disorder in her mother; Breast cancer in her family; Cancer in her maternal grandfather; Depression in her maternal uncle and sister; Diabetes in her paternal aunt; Post-traumatic stress disorder in her mother  ,   reports that she has been smoking  She has never used smokeless tobacco  She reports that she drinks alcohol  She reports that she uses drugs, including Marijuana  ,  has No Known Allergies         Review of Systems   Constitutional: Negative for appetite change, chills, fever and unexpected weight change  HENT: Positive for ear pain and sinus pain   Negative for congestion, dental problem, hearing loss, postnasal drip, rhinorrhea, sinus pressure, sneezing, sore throat, tinnitus and voice change  Eyes: Negative for visual disturbance  Respiratory: Negative for apnea, cough, chest tightness and shortness of breath  Cardiovascular: Negative for chest pain, palpitations and leg swelling  Gastrointestinal: Negative for abdominal pain, blood in stool, constipation, diarrhea, nausea and vomiting  Endocrine: Negative for cold intolerance, heat intolerance, polydipsia, polyphagia and polyuria  Genitourinary: Negative for decreased urine volume, difficulty urinating, dysuria, frequency and hematuria  Musculoskeletal: Negative for arthralgias, back pain, gait problem, joint swelling and myalgias  Skin: Negative for color change, rash and wound  Allergic/Immunologic: Negative for environmental allergies and food allergies  Neurological: Negative for dizziness, syncope, weakness, light-headedness, numbness and headaches  Hematological: Negative for adenopathy  Does not bruise/bleed easily  Psychiatric/Behavioral: Negative for sleep disturbance and suicidal ideas  The patient is not nervous/anxious  Objective:  Vitals:    10/18/18 1611   BP: 128/82   Pulse: 71   Temp: 98 6 °F (37 °C)   SpO2: 98%      Physical Exam   Constitutional: She is oriented to person, place, and time  She appears well-developed and well-nourished  No distress  HENT:   Head: Normocephalic and atraumatic  Left maxillary / frontal   Eyes: Conjunctivae are normal  No scleral icterus  Neck: Normal range of motion  Neck supple  Cardiovascular: Normal rate, regular rhythm and normal heart sounds  Pulmonary/Chest: Effort normal and breath sounds normal    Negative forced expiratory wheeze   Musculoskeletal: Normal range of motion  Lymphadenopathy:     She has no cervical adenopathy  Neurological: She is alert and oriented to person, place, and time  Skin: Skin is warm and dry  Psychiatric: She has a normal mood and affect   Her behavior is normal  Judgment and thought content normal

## 2018-11-06 ENCOUNTER — TELEPHONE (OUTPATIENT)
Dept: FAMILY MEDICINE CLINIC | Facility: CLINIC | Age: 28
End: 2018-11-06

## 2018-11-06 NOTE — TELEPHONE ENCOUNTER
Pt called and states was seen twice by our providers for Headaches  She took abx for it, on the weekend she went to  [facility] and a CT was done-which was normal per pt  She is asking what else she can do since she still have headaches  Please advise

## 2018-12-05 ENCOUNTER — OFFICE VISIT (OUTPATIENT)
Dept: FAMILY MEDICINE CLINIC | Facility: CLINIC | Age: 28
End: 2018-12-05
Payer: COMMERCIAL

## 2018-12-05 VITALS
HEIGHT: 68 IN | DIASTOLIC BLOOD PRESSURE: 58 MMHG | HEART RATE: 100 BPM | WEIGHT: 176 LBS | SYSTOLIC BLOOD PRESSURE: 112 MMHG | OXYGEN SATURATION: 96 % | BODY MASS INDEX: 26.67 KG/M2 | TEMPERATURE: 97.9 F

## 2018-12-05 DIAGNOSIS — Z3A.01 LESS THAN 8 WEEKS GESTATION OF PREGNANCY: ICD-10-CM

## 2018-12-05 DIAGNOSIS — F32.1 CURRENT MODERATE EPISODE OF MAJOR DEPRESSIVE DISORDER WITHOUT PRIOR EPISODE (HCC): Primary | ICD-10-CM

## 2018-12-05 PROBLEM — R30.0 DYSURIA: Status: RESOLVED | Noted: 2018-08-10 | Resolved: 2018-12-05

## 2018-12-05 PROBLEM — Z30.015 ENCOUNTER FOR INITIAL PRESCRIPTION OF VAGINAL RING HORMONAL CONTRACEPTIVE: Status: RESOLVED | Noted: 2018-06-05 | Resolved: 2018-12-05

## 2018-12-05 PROCEDURE — 99213 OFFICE O/P EST LOW 20 MIN: CPT | Performed by: PHYSICIAN ASSISTANT

## 2018-12-05 PROCEDURE — 3008F BODY MASS INDEX DOCD: CPT | Performed by: PHYSICIAN ASSISTANT

## 2018-12-05 RX ORDER — BUTALBITAL, ACETAMINOPHEN AND CAFFEINE 50; 325; 40 MG/1; MG/1; MG/1
TABLET ORAL
Refills: 0 | COMMUNITY
Start: 2018-11-01 | End: 2018-12-05

## 2018-12-05 RX ORDER — ESCITALOPRAM OXALATE 20 MG/1
10 TABLET ORAL DAILY
Qty: 30 TABLET | Refills: 0
Start: 2018-12-05 | End: 2019-01-04

## 2018-12-05 NOTE — PROGRESS NOTES
Assessment/Plan:       Diagnoses and all orders for this visit:    Current moderate episode of major depressive disorder without prior episode (HCC)  -     escitalopram (LEXAPRO) 20 mg tablet; Take 0 5 tablets (10 mg total) by mouth daily    Less than 8 weeks gestation of pregnancy    Other orders  -     Discontinue: butalbital-acetaminophen-caffeine (FIORICET,ESGIC) -40 mg per tablet; take 1 to 2 tablets by mouth every 6 hours if needed for headache            Subjective:      Patient ID: Merlyn Oglesby is a 24 y o  female  Patient is here for follow up of medication for depression  She is approximately 8 weeks pregnant  She is to stop Fioricet, Omeprazole  She is to decrease Lexapro at this time  She is unsure if she is going to keep the pregnancy  She is very nauseated and smells worsen it  She will call OB and let them know of medications  The following portions of the patient's history were reviewed and updated as appropriate:   She has a past medical history of Asthma ,   does not have any pertinent problems on file  ,   has a past surgical history that includes No past surgeries  ,  family history includes Anxiety disorder in her maternal uncle; Asthma in her mother; Bipolar disorder in her mother; Breast cancer in her family; Cancer in her maternal grandfather; Depression in her maternal uncle and sister; Diabetes in her paternal aunt; Post-traumatic stress disorder in her mother  ,   reports that she has been smoking  She has never used smokeless tobacco  She reports that she drinks alcohol  She reports that she uses drugs, including Marijuana  ,  has No Known Allergies     Current Outpatient Prescriptions   Medication Sig Dispense Refill    escitalopram (LEXAPRO) 20 mg tablet Take 0 5 tablets (10 mg total) by mouth daily 30 tablet 0    PROAIR  (90 Base) MCG/ACT inhaler inhale 1 puff by mouth every 6 hours if needed  0     No current facility-administered medications for this visit  Review of Systems   Constitutional: Positive for appetite change and fatigue  Negative for fever  Respiratory: Negative for cough, chest tightness and shortness of breath  Cardiovascular: Negative for chest pain, palpitations and leg swelling  Gastrointestinal: Positive for nausea and vomiting  Negative for abdominal pain  Endocrine: Negative  Genitourinary: Negative for difficulty urinating and vaginal bleeding  Neurological: Negative  Psychiatric/Behavioral: Positive for dysphoric mood  The patient is nervous/anxious  Objective:  Vitals:    12/05/18 1512   BP: 112/58   Pulse: 100   Temp: 97 9 °F (36 6 °C)   SpO2: 96%   Weight: 79 8 kg (176 lb)   Height: 5' 8" (1 727 m)     Body mass index is 26 76 kg/m²  Physical Exam   Constitutional: She is oriented to person, place, and time  She appears well-developed and well-nourished  HENT:   Head: Normocephalic  Right Ear: External ear normal    Left Ear: External ear normal    Mouth/Throat: Oropharynx is clear and moist    Eyes: Pupils are equal, round, and reactive to light  Conjunctivae are normal    Neck: Normal range of motion  No thyromegaly present  Cardiovascular: Normal rate, regular rhythm, normal heart sounds and intact distal pulses  Pulmonary/Chest: Effort normal and breath sounds normal    Abdominal: Soft  Bowel sounds are normal  She exhibits no mass  Musculoskeletal: Normal range of motion  Lymphadenopathy:     She has no cervical adenopathy  Neurological: She is alert and oriented to person, place, and time  Skin: Skin is dry  Psychiatric: She has a normal mood and affect  Her behavior is normal  Judgment and thought content normal    Nursing note and vitals reviewed

## 2019-01-04 ENCOUNTER — OFFICE VISIT (OUTPATIENT)
Dept: OBGYN CLINIC | Facility: MEDICAL CENTER | Age: 29
End: 2019-01-04
Payer: COMMERCIAL

## 2019-01-04 VITALS — DIASTOLIC BLOOD PRESSURE: 80 MMHG | WEIGHT: 185 LBS | SYSTOLIC BLOOD PRESSURE: 140 MMHG | BODY MASS INDEX: 28.13 KG/M2

## 2019-01-04 DIAGNOSIS — N91.2 AMENORRHEA: Primary | ICD-10-CM

## 2019-01-04 PROCEDURE — 76817 TRANSVAGINAL US OBSTETRIC: CPT | Performed by: PHYSICIAN ASSISTANT

## 2019-01-04 NOTE — PROGRESS NOTES
Assessment/Plan:    Amenorrhea  Confirmed IUP  Will sched f/u with nurse for intake, first prenatal visit  Enc start PNV if has not already, teratogen avoidance  To notify w/ any bleeding, pelvic pain         Diagnoses and all orders for this visit:    Amenorrhea        Subjective:      Patient ID: Tanja José is a 34 y o  female  Technician: Study performed by the interpreting physician assistant     Indications:  amenorrhea         LMP 10/15/18          Procedure Details  A gestational sac is seen containing a yolk sac and a mcelroy embryo  The embryonic crown-rump length measures 4 25 cm  and calculates to an estimated gestational age of 5 weeks and 1  Days  Embryonic cardiac activity is present @ 180 BPM  Description of fetal anatomy Normal  Description of ovaries: normal  Description of uterus: normal     Findings:  Viable, mcelroy intrauterine pregnancy at 11weeks,  4 days, (no change to [facility])  with a calculated EDC of 19             The following portions of the patient's history were reviewed and updated as appropriate: allergies, current medications, past family history, past medical history, past social history, past surgical history and problem list     Review of Systems   Constitutional: Negative for appetite change, fatigue and unexpected weight change  Respiratory: Negative for chest tightness and shortness of breath  Cardiovascular: Negative for chest pain, palpitations and leg swelling  Gastrointestinal: Negative for abdominal pain, constipation, diarrhea, nausea and vomiting  Genitourinary: Negative for difficulty urinating, dyspareunia, menstrual problem, pelvic pain and vaginal discharge  Musculoskeletal: Negative for arthralgias and myalgias  Neurological: Negative for dizziness, light-headedness and headaches  Psychiatric/Behavioral: Negative for dysphoric mood  The patient is not nervous/anxious  All other systems reviewed and are negative  Objective:      /80 (BP Location: Left arm, Patient Position: Sitting)   Wt 83 9 kg (185 lb)   LMP 10/15/2018   BMI 28 13 kg/m²          Physical Exam   Constitutional: She is oriented to person, place, and time  She appears well-developed and well-nourished  HENT:   Head: Normocephalic and atraumatic  Neurological: She is alert and oriented to person, place, and time  Skin: Skin is warm and dry  Psychiatric: She has a normal mood and affect  Nursing note and vitals reviewed

## 2019-01-08 ENCOUNTER — TELEPHONE (OUTPATIENT)
Dept: OBGYN CLINIC | Facility: MEDICAL CENTER | Age: 29
End: 2019-01-08

## 2019-01-08 NOTE — TELEPHONE ENCOUNTER
Spoke with pt boyfriend Yaniv Ybarra - he is on the consent form - advised him she can take extra strength tylenol - just not to exceed the amount listed on the box  He understands  Advised him to have patient keep hydrated  If she starts to have a fever, chills, heart palpitations or light headedness to call us back

## 2019-01-11 ENCOUNTER — INITIAL PRENATAL (OUTPATIENT)
Dept: OBGYN CLINIC | Facility: CLINIC | Age: 29
End: 2019-01-11
Payer: COMMERCIAL

## 2019-01-11 ENCOUNTER — APPOINTMENT (OUTPATIENT)
Dept: LAB | Facility: CLINIC | Age: 29
End: 2019-01-11
Payer: COMMERCIAL

## 2019-01-11 VITALS
DIASTOLIC BLOOD PRESSURE: 72 MMHG | HEIGHT: 69 IN | WEIGHT: 186 LBS | SYSTOLIC BLOOD PRESSURE: 118 MMHG | BODY MASS INDEX: 27.55 KG/M2

## 2019-01-11 DIAGNOSIS — Z34.01 ENCOUNTER FOR SUPERVISION OF NORMAL FIRST PREGNANCY IN FIRST TRIMESTER: ICD-10-CM

## 2019-01-11 DIAGNOSIS — Z34.01 ENCOUNTER FOR SUPERVISION OF NORMAL FIRST PREGNANCY IN FIRST TRIMESTER: Primary | ICD-10-CM

## 2019-01-11 LAB
ABO GROUP BLD: NORMAL
BACTERIA UR QL AUTO: ABNORMAL /HPF
BASOPHILS # BLD AUTO: 0.04 THOUSANDS/ΜL (ref 0–0.1)
BASOPHILS NFR BLD AUTO: 0 % (ref 0–1)
BILIRUB UR QL STRIP: NEGATIVE
BLD GP AB SCN SERPL QL: NEGATIVE
CLARITY UR: CLEAR
COLOR UR: YELLOW
EOSINOPHIL # BLD AUTO: 0.07 THOUSAND/ΜL (ref 0–0.61)
EOSINOPHIL NFR BLD AUTO: 1 % (ref 0–6)
ERYTHROCYTE [DISTWIDTH] IN BLOOD BY AUTOMATED COUNT: 12.4 % (ref 11.6–15.1)
GLUCOSE UR STRIP-MCNC: NEGATIVE MG/DL
HCT VFR BLD AUTO: 36 % (ref 34.8–46.1)
HGB BLD-MCNC: 11.9 G/DL (ref 11.5–15.4)
HGB UR QL STRIP.AUTO: NEGATIVE
HYALINE CASTS #/AREA URNS LPF: ABNORMAL /LPF
IMM GRANULOCYTES # BLD AUTO: 0.04 THOUSAND/UL (ref 0–0.2)
IMM GRANULOCYTES NFR BLD AUTO: 0 % (ref 0–2)
KETONES UR STRIP-MCNC: NEGATIVE MG/DL
LEUKOCYTE ESTERASE UR QL STRIP: NEGATIVE
LYMPHOCYTES # BLD AUTO: 2.2 THOUSANDS/ΜL (ref 0.6–4.47)
LYMPHOCYTES NFR BLD AUTO: 22 % (ref 14–44)
MCH RBC QN AUTO: 29.6 PG (ref 26.8–34.3)
MCHC RBC AUTO-ENTMCNC: 33.1 G/DL (ref 31.4–37.4)
MCV RBC AUTO: 90 FL (ref 82–98)
MONOCYTES # BLD AUTO: 0.47 THOUSAND/ΜL (ref 0.17–1.22)
MONOCYTES NFR BLD AUTO: 5 % (ref 4–12)
NEUTROPHILS # BLD AUTO: 7.01 THOUSANDS/ΜL (ref 1.85–7.62)
NEUTS SEG NFR BLD AUTO: 72 % (ref 43–75)
NITRITE UR QL STRIP: NEGATIVE
NON-SQ EPI CELLS URNS QL MICRO: ABNORMAL /HPF
NRBC BLD AUTO-RTO: 0 /100 WBCS
PH UR STRIP.AUTO: 6 [PH] (ref 4.5–8)
PLATELET # BLD AUTO: 292 THOUSANDS/UL (ref 149–390)
PMV BLD AUTO: 9.6 FL (ref 8.9–12.7)
PROT UR STRIP-MCNC: NEGATIVE MG/DL
RBC # BLD AUTO: 4.02 MILLION/UL (ref 3.81–5.12)
RBC #/AREA URNS AUTO: ABNORMAL /HPF
RH BLD: POSITIVE
RUBV IGG SERPL IA-ACNC: >175 IU/ML
SP GR UR STRIP.AUTO: 1.02 (ref 1–1.03)
SPECIMEN EXPIRATION DATE: NORMAL
UROBILINOGEN UR QL STRIP.AUTO: 0.2 E.U./DL
WBC # BLD AUTO: 9.83 THOUSAND/UL (ref 4.31–10.16)
WBC #/AREA URNS AUTO: ABNORMAL /HPF

## 2019-01-11 PROCEDURE — 87086 URINE CULTURE/COLONY COUNT: CPT

## 2019-01-11 PROCEDURE — OBC: Performed by: PHYSICIAN ASSISTANT

## 2019-01-11 PROCEDURE — 80081 OBSTETRIC PANEL INC HIV TSTG: CPT

## 2019-01-11 PROCEDURE — 90686 IIV4 VACC NO PRSV 0.5 ML IM: CPT | Performed by: PHYSICIAN ASSISTANT

## 2019-01-11 PROCEDURE — 36415 COLL VENOUS BLD VENIPUNCTURE: CPT

## 2019-01-11 PROCEDURE — 81001 URINALYSIS AUTO W/SCOPE: CPT

## 2019-01-11 PROCEDURE — 90471 IMMUNIZATION ADMIN: CPT | Performed by: PHYSICIAN ASSISTANT

## 2019-01-11 NOTE — PROGRESS NOTES
OB INTAKE INTERVIEW  * Pt presents for OB intake  * Accompanied by: self  *[id number]  *Hx of  delivery prior to 36 weeks 6 days no  *Last Menstrual Period: Pt's LMP was 10/15/2018  *Ultrasound date:2019   11weeks 1days  *Estimated date of delivery: 2019   * confirmed by LMP    *Signs/Symptoms of Pregnancy   *head aches yes   *constipation no   *headachesyes   *cramping/spotting no   *PICA cravings no  *Diabetes- if you answer yes, please order 1 hour GTT, 50g   *hx of GDM no   *BMI >35 no   *first degree relative with type 2 diabetes no   *hx of PCOS no   *current metformin use no   *prior hx of LGA/macrosomia no   *AMA with other risk factors no  *Hypertension- if you answer yes, please order preeclampsia labs including 24 hour urine protein   *Hx of chronic HTN no   *hx of gestational HTN no   *hx of preeclampsia, eclampsia, or HELLP syndrome no  *Infection Screening-    *does the pt have a hx of MRSA? no   *if yes- please follow MRSA protocol and obtain a nasal swab for MRSA culture  *Immunizations:   *influenza vaccine given today yes   *discussed Tdap vaccine yes    *Interview education   *[facility] Pregnancy Essentials Book reviewed and discussed   *Handouts given:    *Baby and Me phone galindo guide    *Baby and Me support center    *[facility]     *discussed genetic testing- pt interested yes     *appointment at [facility] made yes    *Prenatal lab work scripts   *I have these concerns about this prenatal patient: none  *Details that I feel the provider should be aware of: none    PN1 visit scheduled  The patient was oriented to our practice and all questions were answered  Happy with pregnancy  [facility] 19  pn bldwk ordered in epic  Pt to have drawn asap  Referral entered in epic for [facility] seq screen & level 2 20 wks u s    pnc to call pt with date & time of seq screen  Flu vaccine admin  Today -pt tolerated well  Pt to consider CF testing- informed of NPO status one hour prior  Interviewed by: Serge Cartagena RN, [address] [facility]

## 2019-01-12 LAB — HBV SURFACE AG SER QL: NORMAL

## 2019-01-13 LAB
BACTERIA UR CULT: ABNORMAL
BACTERIA UR CULT: ABNORMAL

## 2019-01-14 LAB
HIV 1+2 AB+HIV1 P24 AG SERPL QL IA: NORMAL
RPR SER QL: NORMAL

## 2019-01-15 ENCOUNTER — TELEPHONE (OUTPATIENT)
Dept: OBGYN CLINIC | Facility: CLINIC | Age: 29
End: 2019-01-15

## 2019-01-17 ENCOUNTER — ROUTINE PRENATAL (OUTPATIENT)
Dept: PERINATAL CARE | Facility: CLINIC | Age: 29
End: 2019-01-17
Payer: COMMERCIAL

## 2019-01-17 VITALS
DIASTOLIC BLOOD PRESSURE: 68 MMHG | HEART RATE: 68 BPM | HEIGHT: 69 IN | BODY MASS INDEX: 28.14 KG/M2 | RESPIRATION RATE: 18 BRPM | SYSTOLIC BLOOD PRESSURE: 110 MMHG | WEIGHT: 190 LBS

## 2019-01-17 DIAGNOSIS — Z3A.13 13 WEEKS GESTATION OF PREGNANCY: ICD-10-CM

## 2019-01-17 DIAGNOSIS — Z34.01 ENCOUNTER FOR SUPERVISION OF NORMAL FIRST PREGNANCY IN FIRST TRIMESTER: ICD-10-CM

## 2019-01-17 DIAGNOSIS — Z36.82 NUCHAL TRANSLUCENCY OF FETUS ON PRENATAL ULTRASOUND: Primary | ICD-10-CM

## 2019-01-17 PROCEDURE — 76813 OB US NUCHAL MEAS 1 GEST: CPT | Performed by: OBSTETRICS & GYNECOLOGY

## 2019-01-17 NOTE — PROGRESS NOTES
Normal NT noted on her ultrasound  She was set up for a 20 week ultrasound and completed blood work today for sequential screen      Neville Pollard MD

## 2019-01-23 ENCOUNTER — INITIAL PRENATAL (OUTPATIENT)
Dept: OBGYN CLINIC | Age: 29
End: 2019-01-23

## 2019-01-23 VITALS — WEIGHT: 191 LBS | DIASTOLIC BLOOD PRESSURE: 80 MMHG | SYSTOLIC BLOOD PRESSURE: 120 MMHG | BODY MASS INDEX: 28.21 KG/M2

## 2019-01-23 DIAGNOSIS — Z3A.14 14 WEEKS GESTATION OF PREGNANCY: ICD-10-CM

## 2019-01-23 DIAGNOSIS — F84.0 HIGH-FUNCTIONING AUTISM SPECTRUM DISORDER: ICD-10-CM

## 2019-01-23 DIAGNOSIS — Z34.82 ENCOUNTER FOR SUPERVISION OF OTHER NORMAL PREGNANCY IN SECOND TRIMESTER: Primary | ICD-10-CM

## 2019-01-23 PROCEDURE — PNV: Performed by: NURSE PRACTITIONER

## 2019-01-23 PROCEDURE — G0145 SCR C/V CYTO,THINLAYER,RESCR: HCPCS | Performed by: NURSE PRACTITIONER

## 2019-01-23 PROCEDURE — 87491 CHLMYD TRACH DNA AMP PROBE: CPT | Performed by: NURSE PRACTITIONER

## 2019-01-23 PROCEDURE — 87591 N.GONORRHOEAE DNA AMP PROB: CPT | Performed by: NURSE PRACTITIONER

## 2019-01-23 NOTE — PATIENT INSTRUCTIONS
Pregnancy at 15 to 18 Weeks   AMBULATORY CARE:   What changes are happening to your body:  Now that you are in your second trimester, you have more energy  You may also feel hungrier than usual  You may start to experience other symptoms, such as heartburn or dizziness  You may be gaining about ½ to 1 pound a week, and your pregnancy is beginning to show  You may need to start wearing maternity clothes  Seek care immediately if:   · You have pain or cramping in your abdomen or low back  · You have heavy vaginal bleeding or clotting  · You pass material that looks like tissue or large clots  Collect the material and bring it with you  Contact your healthcare provider if:   · You cannot keep food or drinks down, and you are losing weight  · You have light bleeding  · You have chills or a fever  · You have vaginal itching, burning, or pain  · You have yellow, green, white, or foul-smelling vaginal discharge  · You have pain or burning when you urinate, less urine than usual, or pink or bloody urine  · You have questions or concerns about your condition or care  How to care for yourself at this stage of your pregnancy:   · Manage heartburn  by eating 4 or 5 small meals each day instead of large meals  Avoid spicy foods  Avoid eating right before bedtime  · Manage nausea and vomiting  Avoid fatty and spicy foods  Eat small meals throughout the day instead of large meals  Malathi may help to decrease nausea  Ask your healthcare provider about other ways of decreasing nausea and vomiting  · Eat a variety of healthy foods  Healthy foods include fruits, vegetables, whole-grain breads, low-fat dairy foods, beans, lean meats, and fish  Drink liquids as directed  Ask how much liquid to drink each day and which liquids are best for you  Limit caffeine to less than 200 milligrams each day  Limit your intake of fish to 2 servings each week   Choose fish low in mercury such as canned light tuna, shrimp, salmon, cod, or tilapia  Do not  eat fish high in mercury such as swordfish, tilefish, chele mackerel, and shark  · Take prenatal vitamins as directed  Your need for certain vitamins and minerals, such as folic acid, increases during pregnancy  Prenatal vitamins provide some of the extra vitamins and minerals you need  Prenatal vitamins may also help to decrease the risk of certain birth defects  · Do not smoke  If you smoke, it is never too late to quit  Smoking increases your risk of a miscarriage and other health problems during your pregnancy  Smoking can cause your baby to be born too early or weigh less at birth  Ask your healthcare provider for information if you need help quitting  · Do not drink alcohol  Alcohol passes from your body to your baby through the placenta  It can affect your baby's brain development and cause fetal alcohol syndrome (FAS)  FAS is a group of conditions that causes mental, behavior, and growth problems  · Talk to your healthcare provider before you take any medicines  Many medicines may harm your baby if you take them when you are pregnant  Do not take any medicines, vitamins, herbs, or supplements without first talking to your healthcare provider  Never use illegal or street drugs (such as marijuana or cocaine) while you are pregnant  Safety tips during pregnancy:   · Avoid hot tubs and saunas  Do not use a hot tub or sauna while you are pregnant, especially during your first trimester  Hot tubs and saunas may raise your baby's temperature and increase the risk of birth defects  · Avoid toxoplasmosis  This is an infection caused by eating raw meat or being around infected cat feces  It can cause birth defects, miscarriages, and other problems  Wash your hands after you touch raw meat  Make sure any meat is well-cooked before you eat it  Avoid raw eggs and unpasteurized milk   Use gloves or ask someone else to clean your cat's litter box while you are pregnant  Changes that are happening with your baby:  By 18 weeks, your baby may be about 6 inches long from the top of the head to the rump (baby's bottom)  Your baby may weigh about 11 ounces  You may be able to feel your baby's movement at about 18 weeks or later  The first movements may not be that noticeable  They may feel like a fluttering sensation  Your baby also makes sucking movements and can hear certain sounds  What you need to know about prenatal care:  During the first 28 weeks of your pregnancy, you will see your healthcare provider once a month  Your healthcare provider will check your blood pressure and weight  You may also need any of the following:  · A urine test  may also be done to check for sugar and protein  These can be signs of gestational diabetes or infection  · A blood test  may be done to check for anemia (low iron level)  · Fundal height check  is a measurement of your uterus to check your baby's growth  This number is usually the same as the number of weeks that you have been pregnant  · An ultrasound  may be done to check your baby's development  Your healthcare provider may be able to tell you what your baby's gender is during the ultrasound  · Your baby's heart rate  will be checked  © 2017 [address] [employer] Information is for End User's use only and may not be sold, redistributed or otherwise used for commercial purposes  All illustrations and images included in CareNotes® are the copyrighted property of [employer]  or [employer]  The above information is an  only  It is not intended as medical advice for individual conditions or treatments  Talk to your doctor, nurse or pharmacist before following any medical regimen to see if it is safe and effective for you

## 2019-01-23 NOTE — PROGRESS NOTES
Problem List Items Addressed This Visit     High-functioning autism spectrum disorder    14 weeks gestation of pregnancy      Other Visit Diagnoses     Encounter for supervision of other normal pregnancy in second trimester    -  Primary    Relevant Orders    Liquid-based pap, screening    Chlamydia/GC amplified DNA by PCR        Here for 1st OB appt  Feels well  Pap/GC/CT done today  Sequ screen in progress  L2 appt on 3/7  Denies LOF/CTX/VB  No concerns

## 2019-01-24 ENCOUNTER — TELEPHONE (OUTPATIENT)
Dept: PERINATAL CARE | Facility: CLINIC | Age: 29
End: 2019-01-24

## 2019-01-24 NOTE — TELEPHONE ENCOUNTER
Left VMM on # provided on communication consent  Part 1 Sequential Screen results provided to pt and explanation of part 2  Instructed pt to call the office for questions  TRF for part 2 provided

## 2019-01-28 LAB
C TRACH DNA SPEC QL NAA+PROBE: NEGATIVE
N GONORRHOEA DNA SPEC QL NAA+PROBE: NEGATIVE

## 2019-01-29 LAB
LAB AP GYN PRIMARY INTERPRETATION: NORMAL
Lab: NORMAL
PATH INTERP SPEC-IMP: NORMAL

## 2019-01-30 ENCOUNTER — TELEPHONE (OUTPATIENT)
Dept: OBGYN CLINIC | Facility: CLINIC | Age: 29
End: 2019-01-30

## 2019-01-30 NOTE — TELEPHONE ENCOUNTER
----- Message from [facility] sent at 1/30/2019  8:43 AM EST -----  Please notify her that her pap smear was normal but showed YEAST  If she is itchy she can use an OTC antifungal such as monistat or clotrimazole intravaginally for 7 nights  Also pls advise her GC/CT were negative  Thanks!

## 2019-01-30 NOTE — TELEPHONE ENCOUNTER
----- Message from [facility] sent at 1/30/2019  8:43 AM EST -----  Please notify her that her pap smear was normal but showed YEAST  If she is itchy she can use an OTC antifungal such as monistat or clotrimazole intravaginally for 7 nights  Also pls advise her GC/CT were negative  Thanks!

## 2019-02-20 ENCOUNTER — TRANSCRIBE ORDERS (OUTPATIENT)
Dept: ADMINISTRATIVE | Facility: HOSPITAL | Age: 29
End: 2019-02-20

## 2019-02-20 ENCOUNTER — APPOINTMENT (OUTPATIENT)
Dept: LAB | Facility: MEDICAL CENTER | Age: 29
End: 2019-02-20
Payer: COMMERCIAL

## 2019-02-20 DIAGNOSIS — Z3A.18 18 WEEKS GESTATION OF PREGNANCY: ICD-10-CM

## 2019-02-20 DIAGNOSIS — Z3A.18 18 WEEKS GESTATION OF PREGNANCY: Primary | ICD-10-CM

## 2019-02-20 PROCEDURE — 36415 COLL VENOUS BLD VENIPUNCTURE: CPT

## 2019-02-21 LAB — SCAN RESULT: NORMAL

## 2019-02-27 ENCOUNTER — TELEPHONE (OUTPATIENT)
Dept: PERINATAL CARE | Facility: CLINIC | Age: 29
End: 2019-02-27

## 2019-02-28 ENCOUNTER — ROUTINE PRENATAL (OUTPATIENT)
Dept: OBGYN CLINIC | Facility: MEDICAL CENTER | Age: 29
End: 2019-02-28

## 2019-02-28 VITALS
SYSTOLIC BLOOD PRESSURE: 110 MMHG | BODY MASS INDEX: 31.01 KG/M2 | HEIGHT: 69 IN | WEIGHT: 209.4 LBS | DIASTOLIC BLOOD PRESSURE: 68 MMHG | RESPIRATION RATE: 14 BRPM

## 2019-02-28 DIAGNOSIS — Z3A.19 19 WEEKS GESTATION OF PREGNANCY: Primary | ICD-10-CM

## 2019-02-28 PROCEDURE — PNV: Performed by: OBSTETRICS & GYNECOLOGY

## 2019-02-28 NOTE — PROGRESS NOTES
Patient is a 68-year-old female, para 0 at 19 weeks and 3 days here for a routine prenatal visit without complaint  Review of systems is negative for fetal movement loss of fluid vaginal bleeding or regular contractions

## 2019-03-07 ENCOUNTER — ROUTINE PRENATAL (OUTPATIENT)
Dept: PERINATAL CARE | Facility: MEDICAL CENTER | Age: 29
End: 2019-03-07
Payer: COMMERCIAL

## 2019-03-07 VITALS
HEIGHT: 69 IN | DIASTOLIC BLOOD PRESSURE: 74 MMHG | BODY MASS INDEX: 31.25 KG/M2 | SYSTOLIC BLOOD PRESSURE: 116 MMHG | WEIGHT: 211 LBS | HEART RATE: 96 BPM

## 2019-03-07 DIAGNOSIS — O28.8 HIGH RISK PREGNANCY WITH HIGH INHIBIN: Primary | ICD-10-CM

## 2019-03-07 DIAGNOSIS — O09.899 HIGH RISK PREGNANCY WITH HIGH INHIBIN: Primary | ICD-10-CM

## 2019-03-07 DIAGNOSIS — Z36.86 ENCOUNTER FOR ANTENATAL SCREENING FOR CERVICAL LENGTH: ICD-10-CM

## 2019-03-07 DIAGNOSIS — Z36.3 ENCOUNTER FOR ANTENATAL SCREENING FOR MALFORMATIONS: ICD-10-CM

## 2019-03-07 DIAGNOSIS — Z3A.20 20 WEEKS GESTATION OF PREGNANCY: ICD-10-CM

## 2019-03-07 PROCEDURE — 76817 TRANSVAGINAL US OBSTETRIC: CPT | Performed by: OBSTETRICS & GYNECOLOGY

## 2019-03-07 PROCEDURE — 99213 OFFICE O/P EST LOW 20 MIN: CPT | Performed by: OBSTETRICS & GYNECOLOGY

## 2019-03-07 PROCEDURE — 76811 OB US DETAILED SNGL FETUS: CPT | Performed by: OBSTETRICS & GYNECOLOGY

## 2019-03-07 RX ORDER — ASPIRIN 81 MG/1
162 TABLET, CHEWABLE ORAL DAILY
Qty: 60 TABLET | Refills: 5 | Status: SHIPPED | OUTPATIENT
Start: 2019-03-07 | End: 2019-08-06 | Stop reason: HOSPADM

## 2019-03-07 NOTE — PROGRESS NOTES
[id number] [facility]: Ms Jennifer Herzog was seen today at 20w3d for anatomic survey and cervical length screening ultrasound  See ultrasound report under "OB Procedures" tab  Please don't hesitate to contact our office with any concerns or questions    Darien Cantu MD

## 2019-03-07 NOTE — PATIENT INSTRUCTIONS
Thank you for choosing [facility] for your  care today  If you have any questions about your ultrasound or care, please do not hesitate to contact us or your primary obstetrician  Please begin taking aspirin 162mg daily (two of the 81mg tablets) for the prevention of preeclampsia  If you have asthma and notice an increase in symptom frequency or severity, consider stopping this medication

## 2019-04-04 ENCOUNTER — ROUTINE PRENATAL (OUTPATIENT)
Dept: OBGYN CLINIC | Facility: MEDICAL CENTER | Age: 29
End: 2019-04-04

## 2019-04-04 ENCOUNTER — ULTRASOUND (OUTPATIENT)
Dept: PERINATAL CARE | Facility: MEDICAL CENTER | Age: 29
End: 2019-04-04
Payer: COMMERCIAL

## 2019-04-04 VITALS
HEART RATE: 108 BPM | BODY MASS INDEX: 32.67 KG/M2 | HEIGHT: 69 IN | WEIGHT: 220.6 LBS | DIASTOLIC BLOOD PRESSURE: 78 MMHG | SYSTOLIC BLOOD PRESSURE: 136 MMHG

## 2019-04-04 VITALS — BODY MASS INDEX: 32.49 KG/M2 | WEIGHT: 220 LBS | DIASTOLIC BLOOD PRESSURE: 76 MMHG | SYSTOLIC BLOOD PRESSURE: 124 MMHG

## 2019-04-04 DIAGNOSIS — Z3A.24 24 WEEKS GESTATION OF PREGNANCY: ICD-10-CM

## 2019-04-04 DIAGNOSIS — Z34.92 PREGNANCY, OBSTETRICAL CARE, SECOND TRIMESTER: Primary | ICD-10-CM

## 2019-04-04 DIAGNOSIS — O36.5920 POOR FETAL GROWTH AFFECTING MANAGEMENT OF MOTHER IN SECOND TRIMESTER, SINGLE OR UNSPECIFIED FETUS: Primary | ICD-10-CM

## 2019-04-04 PROBLEM — O28.8 HIGH RISK PREGNANCY WITH HIGH INHIBIN: Status: ACTIVE | Noted: 2019-04-04

## 2019-04-04 PROBLEM — O09.899 HIGH RISK PREGNANCY WITH HIGH INHIBIN: Status: ACTIVE | Noted: 2019-04-04

## 2019-04-04 PROBLEM — Z00.00 WELL ADULT EXAM: Status: RESOLVED | Noted: 2018-02-12 | Resolved: 2019-04-04

## 2019-04-04 PROBLEM — Z72.0 TOBACCO ABUSE: Status: ACTIVE | Noted: 2019-04-04

## 2019-04-04 PROBLEM — R10.2 PELVIC PAIN: Status: RESOLVED | Noted: 2018-06-05 | Resolved: 2019-04-04

## 2019-04-04 PROBLEM — N91.2 AMENORRHEA: Status: RESOLVED | Noted: 2019-01-04 | Resolved: 2019-04-04

## 2019-04-04 PROCEDURE — 76820 UMBILICAL ARTERY ECHO: CPT | Performed by: OBSTETRICS & GYNECOLOGY

## 2019-04-04 PROCEDURE — 76816 OB US FOLLOW-UP PER FETUS: CPT | Performed by: OBSTETRICS & GYNECOLOGY

## 2019-04-04 PROCEDURE — 76821 MIDDLE CEREBRAL ARTERY ECHO: CPT | Performed by: OBSTETRICS & GYNECOLOGY

## 2019-04-04 PROCEDURE — PNV: Performed by: PHYSICIAN ASSISTANT

## 2019-04-04 PROCEDURE — 99212 OFFICE O/P EST SF 10 MIN: CPT | Performed by: OBSTETRICS & GYNECOLOGY

## 2019-04-23 ENCOUNTER — APPOINTMENT (OUTPATIENT)
Dept: LAB | Facility: CLINIC | Age: 29
End: 2019-04-23
Payer: COMMERCIAL

## 2019-04-23 DIAGNOSIS — Z34.92 PREGNANCY, OBSTETRICAL CARE, SECOND TRIMESTER: ICD-10-CM

## 2019-04-23 LAB
BASOPHILS # BLD AUTO: 0.05 THOUSANDS/ΜL (ref 0–0.1)
BASOPHILS NFR BLD AUTO: 1 % (ref 0–1)
EOSINOPHIL # BLD AUTO: 0.23 THOUSAND/ΜL (ref 0–0.61)
EOSINOPHIL NFR BLD AUTO: 3 % (ref 0–6)
ERYTHROCYTE [DISTWIDTH] IN BLOOD BY AUTOMATED COUNT: 12.2 % (ref 11.6–15.1)
GLUCOSE 1H P 50 G GLC PO SERPL-MCNC: 112 MG/DL
HCT VFR BLD AUTO: 33.4 % (ref 34.8–46.1)
HGB BLD-MCNC: 11.1 G/DL (ref 11.5–15.4)
IMM GRANULOCYTES # BLD AUTO: 0.07 THOUSAND/UL (ref 0–0.2)
IMM GRANULOCYTES NFR BLD AUTO: 1 % (ref 0–2)
LYMPHOCYTES # BLD AUTO: 1.96 THOUSANDS/ΜL (ref 0.6–4.47)
LYMPHOCYTES NFR BLD AUTO: 22 % (ref 14–44)
MCH RBC QN AUTO: 30.2 PG (ref 26.8–34.3)
MCHC RBC AUTO-ENTMCNC: 33.2 G/DL (ref 31.4–37.4)
MCV RBC AUTO: 91 FL (ref 82–98)
MONOCYTES # BLD AUTO: 0.64 THOUSAND/ΜL (ref 0.17–1.22)
MONOCYTES NFR BLD AUTO: 7 % (ref 4–12)
NEUTROPHILS # BLD AUTO: 5.82 THOUSANDS/ΜL (ref 1.85–7.62)
NEUTS SEG NFR BLD AUTO: 66 % (ref 43–75)
NRBC BLD AUTO-RTO: 0 /100 WBCS
PLATELET # BLD AUTO: 260 THOUSANDS/UL (ref 149–390)
PMV BLD AUTO: 10 FL (ref 8.9–12.7)
RBC # BLD AUTO: 3.67 MILLION/UL (ref 3.81–5.12)
WBC # BLD AUTO: 8.77 THOUSAND/UL (ref 4.31–10.16)

## 2019-04-23 PROCEDURE — 36415 COLL VENOUS BLD VENIPUNCTURE: CPT

## 2019-04-23 PROCEDURE — 85025 COMPLETE CBC W/AUTO DIFF WBC: CPT

## 2019-04-23 PROCEDURE — 82950 GLUCOSE TEST: CPT

## 2019-04-23 PROCEDURE — 86592 SYPHILIS TEST NON-TREP QUAL: CPT

## 2019-04-24 ENCOUNTER — TELEPHONE (OUTPATIENT)
Dept: OBGYN CLINIC | Facility: CLINIC | Age: 29
End: 2019-04-24

## 2019-04-24 LAB — RPR SER QL: NORMAL

## 2019-04-30 ENCOUNTER — ULTRASOUND (OUTPATIENT)
Dept: PERINATAL CARE | Facility: OTHER | Age: 29
End: 2019-04-30
Payer: COMMERCIAL

## 2019-04-30 VITALS
WEIGHT: 224.6 LBS | BODY MASS INDEX: 33.27 KG/M2 | HEIGHT: 69 IN | HEART RATE: 94 BPM | SYSTOLIC BLOOD PRESSURE: 117 MMHG | DIASTOLIC BLOOD PRESSURE: 78 MMHG

## 2019-04-30 DIAGNOSIS — Z36.4 ULTRASOUND FOR ANTENATAL SCREENING FOR FETAL GROWTH RESTRICTION: ICD-10-CM

## 2019-04-30 DIAGNOSIS — O99.333 TOBACCO SMOKING AFFECTING PREGNANCY IN THIRD TRIMESTER: ICD-10-CM

## 2019-04-30 DIAGNOSIS — O99.213 MATERNAL OBESITY, ANTEPARTUM, THIRD TRIMESTER: ICD-10-CM

## 2019-04-30 DIAGNOSIS — Z3A.28 28 WEEKS GESTATION OF PREGNANCY: ICD-10-CM

## 2019-04-30 DIAGNOSIS — O28.8 HIGH RISK PREGNANCY WITH HIGH INHIBIN: Primary | ICD-10-CM

## 2019-04-30 DIAGNOSIS — O09.899 HIGH RISK PREGNANCY WITH HIGH INHIBIN: Primary | ICD-10-CM

## 2019-04-30 PROBLEM — O36.5920 POOR FETAL GROWTH AFFECTING MANAGEMENT OF MOTHER IN SECOND TRIMESTER: Status: RESOLVED | Noted: 2019-04-04 | Resolved: 2019-04-30

## 2019-04-30 PROBLEM — Z3A.24 24 WEEKS GESTATION OF PREGNANCY: Status: RESOLVED | Noted: 2018-12-05 | Resolved: 2019-04-30

## 2019-04-30 PROCEDURE — 76816 OB US FOLLOW-UP PER FETUS: CPT | Performed by: OBSTETRICS & GYNECOLOGY

## 2019-04-30 PROCEDURE — 99212 OFFICE O/P EST SF 10 MIN: CPT | Performed by: OBSTETRICS & GYNECOLOGY

## 2019-05-03 ENCOUNTER — ROUTINE PRENATAL (OUTPATIENT)
Dept: OBGYN CLINIC | Facility: MEDICAL CENTER | Age: 29
End: 2019-05-03
Payer: COMMERCIAL

## 2019-05-03 VITALS — SYSTOLIC BLOOD PRESSURE: 120 MMHG | DIASTOLIC BLOOD PRESSURE: 78 MMHG | WEIGHT: 226.6 LBS | BODY MASS INDEX: 33.46 KG/M2

## 2019-05-03 DIAGNOSIS — Z3A.28 28 WEEKS GESTATION OF PREGNANCY: ICD-10-CM

## 2019-05-03 DIAGNOSIS — Z23 NEED FOR TDAP VACCINATION: ICD-10-CM

## 2019-05-03 DIAGNOSIS — O09.899 HIGH RISK PREGNANCY WITH HIGH INHIBIN: ICD-10-CM

## 2019-05-03 DIAGNOSIS — Z34.93 THIRD TRIMESTER PREGNANCY: ICD-10-CM

## 2019-05-03 DIAGNOSIS — O28.8 HIGH RISK PREGNANCY WITH HIGH INHIBIN: ICD-10-CM

## 2019-05-03 PROCEDURE — 90715 TDAP VACCINE 7 YRS/> IM: CPT

## 2019-05-03 PROCEDURE — 90471 IMMUNIZATION ADMIN: CPT

## 2019-05-03 PROCEDURE — PNV: Performed by: OBSTETRICS & GYNECOLOGY

## 2019-05-14 ENCOUNTER — ROUTINE PRENATAL (OUTPATIENT)
Dept: OBGYN CLINIC | Facility: CLINIC | Age: 29
End: 2019-05-14

## 2019-05-14 VITALS
WEIGHT: 231.38 LBS | BODY MASS INDEX: 34.17 KG/M2 | SYSTOLIC BLOOD PRESSURE: 125 MMHG | DIASTOLIC BLOOD PRESSURE: 70 MMHG

## 2019-05-14 DIAGNOSIS — O28.8 HIGH RISK PREGNANCY WITH HIGH INHIBIN: ICD-10-CM

## 2019-05-14 DIAGNOSIS — O09.899 HIGH RISK PREGNANCY WITH HIGH INHIBIN: ICD-10-CM

## 2019-05-14 DIAGNOSIS — Z34.80 SUPERVISION OF OTHER NORMAL PREGNANCY, ANTEPARTUM: Primary | ICD-10-CM

## 2019-05-14 DIAGNOSIS — Z3A.30 30 WEEKS GESTATION OF PREGNANCY: ICD-10-CM

## 2019-05-14 PROCEDURE — PNV: Performed by: OBSTETRICS & GYNECOLOGY

## 2019-05-30 ENCOUNTER — ROUTINE PRENATAL (OUTPATIENT)
Dept: OBGYN CLINIC | Facility: CLINIC | Age: 29
End: 2019-05-30

## 2019-05-30 VITALS — SYSTOLIC BLOOD PRESSURE: 118 MMHG | WEIGHT: 231 LBS | BODY MASS INDEX: 34.11 KG/M2 | DIASTOLIC BLOOD PRESSURE: 78 MMHG

## 2019-05-30 DIAGNOSIS — O09.899 HIGH RISK PREGNANCY WITH HIGH INHIBIN: Primary | ICD-10-CM

## 2019-05-30 DIAGNOSIS — O28.8 HIGH RISK PREGNANCY WITH HIGH INHIBIN: Primary | ICD-10-CM

## 2019-05-30 PROCEDURE — PNV: Performed by: OBSTETRICS & GYNECOLOGY

## 2019-06-04 ENCOUNTER — ULTRASOUND (OUTPATIENT)
Dept: PERINATAL CARE | Facility: OTHER | Age: 29
End: 2019-06-04
Payer: COMMERCIAL

## 2019-06-04 VITALS
SYSTOLIC BLOOD PRESSURE: 108 MMHG | HEART RATE: 91 BPM | WEIGHT: 231.8 LBS | DIASTOLIC BLOOD PRESSURE: 73 MMHG | BODY MASS INDEX: 34.33 KG/M2 | HEIGHT: 69 IN

## 2019-06-04 DIAGNOSIS — O28.8 HIGH RISK PREGNANCY WITH HIGH INHIBIN: Primary | ICD-10-CM

## 2019-06-04 DIAGNOSIS — O99.213 MATERNAL OBESITY, ANTEPARTUM, THIRD TRIMESTER: ICD-10-CM

## 2019-06-04 DIAGNOSIS — Z3A.33 33 WEEKS GESTATION OF PREGNANCY: ICD-10-CM

## 2019-06-04 DIAGNOSIS — O09.899 HIGH RISK PREGNANCY WITH HIGH INHIBIN: Primary | ICD-10-CM

## 2019-06-04 DIAGNOSIS — Z34.80 SUPERVISION OF OTHER NORMAL PREGNANCY, ANTEPARTUM: ICD-10-CM

## 2019-06-04 PROBLEM — Z72.0 TOBACCO ABUSE: Status: RESOLVED | Noted: 2019-04-04 | Resolved: 2019-06-04

## 2019-06-04 PROCEDURE — 76816 OB US FOLLOW-UP PER FETUS: CPT | Performed by: OBSTETRICS & GYNECOLOGY

## 2019-06-12 ENCOUNTER — ROUTINE PRENATAL (OUTPATIENT)
Dept: OBGYN CLINIC | Facility: MEDICAL CENTER | Age: 29
End: 2019-06-12

## 2019-06-12 VITALS — BODY MASS INDEX: 35 KG/M2 | SYSTOLIC BLOOD PRESSURE: 122 MMHG | WEIGHT: 237 LBS | DIASTOLIC BLOOD PRESSURE: 70 MMHG

## 2019-06-12 DIAGNOSIS — O28.8 HIGH RISK PREGNANCY WITH HIGH INHIBIN: ICD-10-CM

## 2019-06-12 DIAGNOSIS — O09.899 HIGH RISK PREGNANCY WITH HIGH INHIBIN: ICD-10-CM

## 2019-06-12 DIAGNOSIS — Z3A.34 34 WEEKS GESTATION OF PREGNANCY: ICD-10-CM

## 2019-06-12 DIAGNOSIS — Z34.80 SUPERVISION OF OTHER NORMAL PREGNANCY, ANTEPARTUM: Primary | ICD-10-CM

## 2019-06-12 PROCEDURE — PNV: Performed by: NURSE PRACTITIONER

## 2019-06-27 ENCOUNTER — ROUTINE PRENATAL (OUTPATIENT)
Dept: OBGYN CLINIC | Facility: MEDICAL CENTER | Age: 29
End: 2019-06-27

## 2019-06-27 VITALS — WEIGHT: 238 LBS | SYSTOLIC BLOOD PRESSURE: 124 MMHG | BODY MASS INDEX: 35.15 KG/M2 | DIASTOLIC BLOOD PRESSURE: 70 MMHG

## 2019-06-27 DIAGNOSIS — Z34.80 SUPERVISION OF OTHER NORMAL PREGNANCY, ANTEPARTUM: ICD-10-CM

## 2019-06-27 DIAGNOSIS — Z3A.34 34 WEEKS GESTATION OF PREGNANCY: ICD-10-CM

## 2019-06-27 DIAGNOSIS — O09.899 HIGH RISK PREGNANCY WITH HIGH INHIBIN: ICD-10-CM

## 2019-06-27 DIAGNOSIS — Z34.93 PREGNANCY, OBSTETRICAL CARE, THIRD TRIMESTER: Primary | ICD-10-CM

## 2019-06-27 DIAGNOSIS — O28.8 HIGH RISK PREGNANCY WITH HIGH INHIBIN: ICD-10-CM

## 2019-06-27 PROCEDURE — 87653 STREP B DNA AMP PROBE: CPT | Performed by: PHYSICIAN ASSISTANT

## 2019-06-27 PROCEDURE — PNV: Performed by: PHYSICIAN ASSISTANT

## 2019-06-28 PROBLEM — Z3A.34 34 WEEKS GESTATION OF PREGNANCY: Status: RESOLVED | Noted: 2019-05-03 | Resolved: 2019-06-28

## 2019-06-29 LAB — GP B STREP DNA SPEC QL NAA+PROBE: NORMAL

## 2019-07-01 ENCOUNTER — ROUTINE PRENATAL (OUTPATIENT)
Dept: OBGYN CLINIC | Facility: CLINIC | Age: 29
End: 2019-07-01

## 2019-07-01 VITALS — SYSTOLIC BLOOD PRESSURE: 160 MMHG | BODY MASS INDEX: 35 KG/M2 | DIASTOLIC BLOOD PRESSURE: 82 MMHG | WEIGHT: 237 LBS

## 2019-07-01 DIAGNOSIS — O16.3 HYPERTENSION AFFECTING PREGNANCY IN THIRD TRIMESTER: ICD-10-CM

## 2019-07-01 DIAGNOSIS — O28.8 HIGH RISK PREGNANCY WITH HIGH INHIBIN: ICD-10-CM

## 2019-07-01 DIAGNOSIS — O99.213 MATERNAL OBESITY, ANTEPARTUM, THIRD TRIMESTER: ICD-10-CM

## 2019-07-01 DIAGNOSIS — O09.899 HIGH RISK PREGNANCY WITH HIGH INHIBIN: ICD-10-CM

## 2019-07-01 DIAGNOSIS — Z34.83 ENCOUNTER FOR SUPERVISION OF OTHER NORMAL PREGNANCY IN THIRD TRIMESTER: Primary | ICD-10-CM

## 2019-07-01 PROCEDURE — PNV: Performed by: NURSE PRACTITIONER

## 2019-07-01 NOTE — PROGRESS NOTES
Feels well  Denies LOF/CTX/VB  No concerns  Discussed fetal kick counting  Encouraged to continue with her vaginal massages  Elevated BP - preeclampsia labs given to be done today  Discussed monitoring for gestational hypertension and preeclampsia  No symptoms and no protein in urine at this time  No visual disturbance or headache

## 2019-07-01 NOTE — PATIENT INSTRUCTIONS
Pregnancy at 28 to 38 Weeks   AMBULATORY CARE:   What changes are happening to your body: You are considered full term at the beginning of 37 weeks  Your breathing may be easier if your baby has moved down into a head-down position  You may need to urinate more often because the baby may be pressing on your bladder  You may also feel more discomfort and get tired easily  Seek care immediately if:   · You develop a severe headache that does not go away  · You have new or increased vision changes, such as blurred or spotted vision  · You have new or increased swelling in your face or hands  · You have vaginal spotting or bleeding  · Your water broke or you feel warm water gushing or trickling from your vagina  Contact your healthcare provider if:   · You have more than 5 contractions in 1 hour  · You notice any changes in your baby's movements  · You have abdominal cramps, pressure, or tightening  · You have a change in vaginal discharge  · You have chills or a fever  · You have vaginal itching, burning, or pain  · You have yellow, green, white, or foul-smelling vaginal discharge  · You have pain or burning when you urinate, less urine than usual, or pink or bloody urine  · You have questions or concerns about your condition or care  How to care for yourself at this stage of your pregnancy:   · Eat a variety of healthy foods  Healthy foods include fruits, vegetables, whole-grain breads, low-fat dairy foods, beans, lean meats, and fish  Drink liquids as directed  Ask how much liquid to drink each day and which liquids are best for you  Limit caffeine to less than 200 milligrams each day  Limit your intake of fish to 2 servings each week  Choose fish low in mercury such as canned light tuna, shrimp, crab, salmon, cod, or tilapia  Do not  eat fish high in mercury such as swordfish, tilefish, chele mackerel, and shark  · Take prenatal vitamins as directed    Your need for certain vitamins and minerals, such as folic acid, increases during pregnancy  Prenatal vitamins provide some of the extra vitamins and minerals you need  Prenatal vitamins may also help to decrease the risk of certain birth defects  · Rest as needed  Put your feet up if you have swelling in your ankles and feet  · Do not smoke  If you smoke, it is never too late to quit  Smoking increases your risk of a miscarriage and other health problems during your pregnancy  Smoking can cause your baby to be born too early or weigh less at birth  Ask your healthcare provider for information if you need help quitting  · Do not drink alcohol  Alcohol passes from your body to your baby through the placenta  It can affect your baby's brain development and cause fetal alcohol syndrome (FAS)  FAS is a group of conditions that causes mental, behavior, and growth problems  · Talk to your healthcare provider before you take any medicines  Many medicines may harm your baby if you take them when you are pregnant  Do not take any medicines, vitamins, herbs, or supplements without first talking to your healthcare provider  Never use illegal or street drugs (such as marijuana or cocaine) while you are pregnant  · Talk to your healthcare provider before you travel  You may not be able to travel in an airplane after 36 weeks  He may also recommend that you avoid long road trips  Safety tips during pregnancy:   · Avoid hot tubs and saunas  Do not use a hot tub or sauna while you are pregnant, especially during your first trimester  Hot tubs and saunas may raise your baby's temperature and increase the risk of birth defects  · Avoid toxoplasmosis  This is an infection caused by eating raw meat or being around infected cat feces  It can cause birth defects, miscarriages, and other problems  Wash your hands after you touch raw meat  Make sure any meat is well-cooked before you eat it   Avoid raw eggs and unpasteurized milk  Use gloves or ask someone else to clean your cat's litter box while you are pregnant  · Ask your healthcare provider about travel  The most comfortable time to travel is during the second trimester  Ask your healthcare provider if you can travel after 36 weeks  You may not be able to travel in an airplane after 36 weeks  He may also recommend that you avoid long road trips  Changes that are happening with your baby:  By 38 weeks, your baby may weigh between 6 and 9 pounds  Your baby may be about 14 inches long from the top of the head to the rump (baby's bottom)  Your baby hears well enough to know your voice  As your baby gets larger, you may feel fewer kicks and more stretching and rolling  Your baby may move into a head-down position  Your baby will also rest lower in your abdomen  What you need to know about prenatal care: Your healthcare provider will check your blood pressure and weight  You may also need the following:  · A urine test  may also be done to check for sugar and protein  These can be signs of gestational diabetes or infection  Protein in your urine may also be a sign of preeclampsia  Preeclampsia is a condition that can develop during week 20 or later of your pregnancy  It causes high blood pressure, and it can cause problems with your kidneys and other organs  · A blood test  may be done to check for anemia (low iron level)  · A Tdap vaccine  may be recommended by your healthcare provider  · A group B strep test  is a test that is done to check for group B strep infection  Group B strep is a type of bacteria that may be found in the vagina or rectum  It can be passed to your baby during delivery if you have it  Your healthcare provider will take swab your vagina or rectum and send the sample to the lab for tests  · Fundal height  is a measurement of your uterus to check your baby's growth   This number is usually the same as the number of weeks that you have been pregnant  Your healthcare provider may also check your baby's position  · Your baby's heart rate  will be checked  © 2017 [employer] [employer] Information is for End User's use only and may not be sold, redistributed or otherwise used for commercial purposes  All illustrations and images included in CareNotes® are the copyrighted property of A [employer]  or Peter [employer]  The above information is an  only  It is not intended as medical advice for individual conditions or treatments  Talk to your doctor, nurse or pharmacist before following any medical regimen to see if it is safe and effective for you

## 2019-07-08 ENCOUNTER — APPOINTMENT (OUTPATIENT)
Dept: LAB | Facility: CLINIC | Age: 29
End: 2019-07-08
Payer: COMMERCIAL

## 2019-07-08 DIAGNOSIS — O16.3 MATERNAL HYPERTENSION IN THIRD TRIMESTER: Primary | ICD-10-CM

## 2019-07-08 LAB
ALBUMIN SERPL BCP-MCNC: 2.5 G/DL (ref 3.5–5)
ALP SERPL-CCNC: 143 U/L (ref 46–116)
ALT SERPL W P-5'-P-CCNC: 13 U/L (ref 12–78)
ANION GAP SERPL CALCULATED.3IONS-SCNC: 9 MMOL/L (ref 4–13)
AST SERPL W P-5'-P-CCNC: 13 U/L (ref 5–45)
BASOPHILS # BLD AUTO: 0.04 THOUSANDS/ΜL (ref 0–0.1)
BASOPHILS NFR BLD AUTO: 1 % (ref 0–1)
BILIRUB SERPL-MCNC: 0.17 MG/DL (ref 0.2–1)
BUN SERPL-MCNC: 5 MG/DL (ref 5–25)
CALCIUM SERPL-MCNC: 9.7 MG/DL (ref 8.3–10.1)
CHLORIDE SERPL-SCNC: 106 MMOL/L (ref 100–108)
CO2 SERPL-SCNC: 21 MMOL/L (ref 21–32)
CREAT 24H UR-MRATE: 1.2 G/24HR (ref 0.6–1.8)
CREAT SERPL-MCNC: 0.68 MG/DL (ref 0.6–1.3)
CREAT UR-MCNC: 42 MG/DL
EOSINOPHIL # BLD AUTO: 0.12 THOUSAND/ΜL (ref 0–0.61)
EOSINOPHIL NFR BLD AUTO: 1 % (ref 0–6)
ERYTHROCYTE [DISTWIDTH] IN BLOOD BY AUTOMATED COUNT: 12.8 % (ref 11.6–15.1)
GFR SERPL CREATININE-BSD FRML MDRD: 121 ML/MIN/1.73SQ M
GLUCOSE P FAST SERPL-MCNC: 97 MG/DL (ref 65–99)
HCT VFR BLD AUTO: 33.1 % (ref 34.8–46.1)
HGB BLD-MCNC: 10.8 G/DL (ref 11.5–15.4)
IMM GRANULOCYTES # BLD AUTO: 0.06 THOUSAND/UL (ref 0–0.2)
IMM GRANULOCYTES NFR BLD AUTO: 1 % (ref 0–2)
LYMPHOCYTES # BLD AUTO: 2.51 THOUSANDS/ΜL (ref 0.6–4.47)
LYMPHOCYTES NFR BLD AUTO: 29 % (ref 14–44)
MCH RBC QN AUTO: 28.9 PG (ref 26.8–34.3)
MCHC RBC AUTO-ENTMCNC: 32.6 G/DL (ref 31.4–37.4)
MCV RBC AUTO: 89 FL (ref 82–98)
MONOCYTES # BLD AUTO: 0.56 THOUSAND/ΜL (ref 0.17–1.22)
MONOCYTES NFR BLD AUTO: 6 % (ref 4–12)
NEUTROPHILS # BLD AUTO: 5.48 THOUSANDS/ΜL (ref 1.85–7.62)
NEUTS SEG NFR BLD AUTO: 62 % (ref 43–75)
NRBC BLD AUTO-RTO: 0 /100 WBCS
PLATELET # BLD AUTO: 242 THOUSANDS/UL (ref 149–390)
PMV BLD AUTO: 10.6 FL (ref 8.9–12.7)
POTASSIUM SERPL-SCNC: 3.6 MMOL/L (ref 3.5–5.3)
PROT 24H UR-MCNC: 168 MG/24 HRS (ref 40–150)
PROT SERPL-MCNC: 6.4 G/DL (ref 6.4–8.2)
PROT UR-MCNC: 7 MG/DL
PROT/CREAT UR: 0.17 MG/G{CREAT} (ref 0–0.1)
RBC # BLD AUTO: 3.74 MILLION/UL (ref 3.81–5.12)
SODIUM SERPL-SCNC: 136 MMOL/L (ref 136–145)
SPECIMEN VOL UR: 2800 ML
SPECIMEN VOL UR: 2800 ML
WBC # BLD AUTO: 8.77 THOUSAND/UL (ref 4.31–10.16)

## 2019-07-08 PROCEDURE — 82570 ASSAY OF URINE CREATININE: CPT | Performed by: NURSE PRACTITIONER

## 2019-07-08 PROCEDURE — 36415 COLL VENOUS BLD VENIPUNCTURE: CPT | Performed by: NURSE PRACTITIONER

## 2019-07-08 PROCEDURE — 84156 ASSAY OF PROTEIN URINE: CPT | Performed by: NURSE PRACTITIONER

## 2019-07-08 PROCEDURE — 85025 COMPLETE CBC W/AUTO DIFF WBC: CPT | Performed by: NURSE PRACTITIONER

## 2019-07-08 PROCEDURE — 80053 COMPREHEN METABOLIC PANEL: CPT | Performed by: NURSE PRACTITIONER

## 2019-07-08 PROCEDURE — 82570 ASSAY OF URINE CREATININE: CPT

## 2019-07-09 ENCOUNTER — ROUTINE PRENATAL (OUTPATIENT)
Dept: OBGYN CLINIC | Age: 29
End: 2019-07-09

## 2019-07-09 VITALS — SYSTOLIC BLOOD PRESSURE: 112 MMHG | BODY MASS INDEX: 35.44 KG/M2 | WEIGHT: 240 LBS | DIASTOLIC BLOOD PRESSURE: 78 MMHG

## 2019-07-09 DIAGNOSIS — Z34.80 SUPERVISION OF OTHER NORMAL PREGNANCY, ANTEPARTUM: Primary | ICD-10-CM

## 2019-07-09 PROCEDURE — PNV: Performed by: OBSTETRICS & GYNECOLOGY

## 2019-07-15 ENCOUNTER — ROUTINE PRENATAL (OUTPATIENT)
Dept: OBGYN CLINIC | Facility: CLINIC | Age: 29
End: 2019-07-15

## 2019-07-15 VITALS — BODY MASS INDEX: 35.74 KG/M2 | DIASTOLIC BLOOD PRESSURE: 82 MMHG | SYSTOLIC BLOOD PRESSURE: 138 MMHG | WEIGHT: 242 LBS

## 2019-07-15 DIAGNOSIS — O99.213 MATERNAL OBESITY, ANTEPARTUM, THIRD TRIMESTER: ICD-10-CM

## 2019-07-15 DIAGNOSIS — O28.8 HIGH RISK PREGNANCY WITH HIGH INHIBIN: ICD-10-CM

## 2019-07-15 DIAGNOSIS — O09.899 HIGH RISK PREGNANCY WITH HIGH INHIBIN: ICD-10-CM

## 2019-07-15 DIAGNOSIS — Z34.83 ENCOUNTER FOR SUPERVISION OF OTHER NORMAL PREGNANCY IN THIRD TRIMESTER: Primary | ICD-10-CM

## 2019-07-15 PROCEDURE — PNV: Performed by: NURSE PRACTITIONER

## 2019-07-15 NOTE — PROGRESS NOTES
Problem List Items Addressed This Visit        Other    High risk pregnancy with high inhibin    Maternal obesity, antepartum, third trimester      Other Visit Diagnoses     Encounter for supervision of other normal pregnancy in third trimester    -  Primary        Feels well  Denies LOF/CTX/VB  No concerns  Having some vaginal "pulling" but no contractions yet  Discussed fetal kick counting  Advised to increase fluids on the warmer days  Encouraged to start with her vaginal massages

## 2019-07-15 NOTE — PATIENT INSTRUCTIONS
Pregnancy at 44 to 40 Weeks   AMBULATORY CARE:   What changes are happening to your body: You are now getting close to your due date  Your due date is just an estimate of when your baby will be born  Your baby may be born before or after your due date  Your breathing may be easier if your baby has moved down into a head-down position  You may need to urinate more often because the baby may be pressing on your bladder  You may also feel more discomfort and tire easily  You may also be having trouble sleeping  Seek care immediately if:   · You develop a severe headache that does not go away  · You have new or increased vision changes, such as blurred or spotted vision  · You have new or increased swelling in your face or hands  · You have vaginal spotting or bleeding  · Your water broke or you feel warm water gushing or trickling from your vagina  Contact your healthcare provider if:   · You have more than 5 contractions in 1 hour  · You notice any changes in your baby's movements  · You have abdominal cramps, pressure, or tightening  · You have a change in vaginal discharge  · You have chills or a fever  · You have vaginal itching, burning, or pain  · You have yellow, green, white, or foul-smelling vaginal discharge  · You have pain or burning when you urinate, less urine than usual, or pink or bloody urine  · You have questions or concerns about your condition or care  How to care for yourself at this stage of your pregnancy:   · Eat a variety of healthy foods  Healthy foods include fruits, vegetables, whole-grain breads, low-fat dairy foods, beans, lean meats, and fish  Drink liquids as directed  Ask how much liquid to drink each day and which liquids are best for you  Limit caffeine to less than 200 milligrams each day  Limit your intake of fish to 2 servings each week  Choose fish low in mercury such as canned light tuna, shrimp, crab, salmon, cod, or tilapia   Do not eat fish high in mercury such as swordfish, tilefish, chele mackerel, and shark  · Take prenatal vitamins as directed  Your need for certain vitamins and minerals, such as folic acid, increases during pregnancy  Prenatal vitamins provide some of the extra vitamins and minerals you need  Prenatal vitamins may also help to decrease the risk of certain birth defects  · Rest as needed  Put your feet up if you have swelling in your ankles and feet  · Do not smoke  If you smoke, it is never too late to quit  Smoking increases your risk of a miscarriage and other health problems during your pregnancy  Smoking can cause your baby to be born too early or weigh less at birth  Ask your healthcare provider for information if you need help quitting  · Do not drink alcohol  Alcohol passes from your body to your baby through the placenta  It can affect your baby's brain development and cause fetal alcohol syndrome (FAS)  FAS is a group of conditions that causes mental, behavior, and growth problems  · Talk to your healthcare provider before you take any medicines  Many medicines may harm your baby if you take them when you are pregnant  Do not take any medicines, vitamins, herbs, or supplements without first talking to your healthcare provider  Never use illegal or street drugs (such as marijuana or cocaine) while you are pregnant  · Talk to your healthcare provider before you travel  You may not be able to travel in an airplane after 36 weeks  He may also recommend that you avoid long road trips  Safety tips during pregnancy:   · Avoid hot tubs and saunas  Do not use a hot tub or sauna while you are pregnant, especially during your first trimester  Hot tubs and saunas may raise your baby's temperature and increase the risk of birth defects  · Avoid toxoplasmosis  This is an infection caused by eating raw meat or being around infected cat feces   It can cause birth defects, miscarriages, and other problems  Wash your hands after you touch raw meat  Make sure any meat is well-cooked before you eat it  Avoid raw eggs and unpasteurized milk  Use gloves or ask someone else to clean your cat's litter box while you are pregnant  · Ask your healthcare provider about travel  The most comfortable time to travel is during the second trimester  Ask your healthcare provider if you can travel after 36 weeks  You may not be able to travel in an airplane after 36 weeks  He may also recommend that you avoid long road trips  Changes that are happening with your baby: Your baby is ready to be born  At birth, your baby may weigh about 6 to 9 pounds and be about 19 to 21 inches long  Your baby may be in a head-down position  Your baby will also rest lower in your abdomen  What you need to know about prenatal care: Your healthcare provider will check your blood pressure and weight  You may also need the following:  · A urine test  may also be done to check for sugar and protein  These can be signs of gestational diabetes or infection  Protein in your urine may also be a sign of preeclampsia  Preeclampsia is a condition that can develop during week 20 or later of your pregnancy  It causes high blood pressure, and it can cause problems with your kidneys and other organs  · Your baby's heart rate  will be checked  © 2017 [address] [employer] Information is for End User's use only and may not be sold, redistributed or otherwise used for commercial purposes  All illustrations and images included in CareNotes® are the copyrighted property of A [employer] M [employer]  or [employer]  The above information is an  only  It is not intended as medical advice for individual conditions or treatments  Talk to your doctor, nurse or pharmacist before following any medical regimen to see if it is safe and effective for you

## 2019-07-24 ENCOUNTER — ROUTINE PRENATAL (OUTPATIENT)
Dept: OBGYN CLINIC | Age: 29
End: 2019-07-24

## 2019-07-24 VITALS
SYSTOLIC BLOOD PRESSURE: 126 MMHG | HEIGHT: 69 IN | RESPIRATION RATE: 14 BRPM | DIASTOLIC BLOOD PRESSURE: 64 MMHG | WEIGHT: 242.2 LBS | BODY MASS INDEX: 35.87 KG/M2

## 2019-07-24 DIAGNOSIS — Z3A.40 40 WEEKS GESTATION OF PREGNANCY: Primary | ICD-10-CM

## 2019-07-24 PROCEDURE — PNV: Performed by: OBSTETRICS & GYNECOLOGY

## 2019-07-24 NOTE — PROGRESS NOTES
Patient is a 22-year-old female, para 0 at 40 weeks and 2 days here for routine prenatal visit without complaint review of systems is positive for fetal movement negative for loss of fluid vaginal bleeding or regular contractions  Cervix closed thick and posterior  Referral to  [facility]  Patient denies symptoms of preeclampsia

## 2019-07-25 ENCOUNTER — ULTRASOUND (OUTPATIENT)
Dept: PERINATAL CARE | Facility: OTHER | Age: 29
End: 2019-07-25
Payer: COMMERCIAL

## 2019-07-25 ENCOUNTER — TELEPHONE (OUTPATIENT)
Dept: OBGYN CLINIC | Facility: CLINIC | Age: 29
End: 2019-07-25

## 2019-07-25 VITALS
WEIGHT: 243.6 LBS | DIASTOLIC BLOOD PRESSURE: 80 MMHG | HEIGHT: 69 IN | HEART RATE: 103 BPM | BODY MASS INDEX: 36.08 KG/M2 | SYSTOLIC BLOOD PRESSURE: 125 MMHG

## 2019-07-25 DIAGNOSIS — O09.899 HIGH RISK PREGNANCY WITH HIGH INHIBIN: ICD-10-CM

## 2019-07-25 DIAGNOSIS — O99.213 MATERNAL OBESITY, ANTEPARTUM, THIRD TRIMESTER: ICD-10-CM

## 2019-07-25 DIAGNOSIS — Z34.80 SUPERVISION OF OTHER NORMAL PREGNANCY, ANTEPARTUM: ICD-10-CM

## 2019-07-25 DIAGNOSIS — O28.8 HIGH RISK PREGNANCY WITH HIGH INHIBIN: ICD-10-CM

## 2019-07-25 DIAGNOSIS — Z3A.40 40 WEEKS GESTATION OF PREGNANCY: Primary | ICD-10-CM

## 2019-07-25 PROCEDURE — 76815 OB US LIMITED FETUS(S): CPT | Performed by: OBSTETRICS & GYNECOLOGY

## 2019-07-25 PROCEDURE — 59025 FETAL NON-STRESS TEST: CPT | Performed by: OBSTETRICS & GYNECOLOGY

## 2019-07-25 NOTE — TELEPHONE ENCOUNTER
Dear Dr Jordyn Orozco:    Pt's ONRBERT was 7/22/19, GA is 40 weeks + 3 days  Should Pt be scheduled for an IOL per your directive? Please advise  Thank you!

## 2019-07-25 NOTE — PROGRESS NOTES
NST procedure and expected outcome explained to patient  Daily fetal kick count discussed with handout given  Patient verbalized understanding of all and was receptive      Marcello Patiño RN

## 2019-07-25 NOTE — TELEPHONE ENCOUNTER
Ms Shayy Salcido was seen yesterday in the [facility] office by Dr Silva Vargas told Ms Sahyy Salcido he was going to schedule her for an induction but gave her no information  Can someone please call her to discuss  Thanking you in advance   cv

## 2019-07-25 NOTE — LETTER
NST sleeve cover sheet    Patient name: Vasu Hernandez    [id number]  : 1990  MRN: [id number]    NORBERT: Estimated Date of Delivery: 19    Obstetrician: ___                             Aileen Yang               Reason(s) for testing:  _                                                      Post Date__________________        Testing frequency:    __x_ 2x/wk  ___ 1x/wk  ___ Dopplers  ___ BPP?       Last growth scan: __________________________________________

## 2019-07-25 NOTE — PATIENT INSTRUCTIONS
C4 complement assay   GENERAL INFORMATION:  What is this test?  This test measures complement C4 in the blood  Complement C4 is a part of the body's immune system  This test is used to evaluate and manage conditions that involve inflammation or immune system conditions  What are other names for this test?  · C>4< complement assay  · Complement C4 measurement  · Complement component 4 test  What are related tests? · C1 esterase inhibitor assay  Why do I need this test?  Laboratory tests may be done for many reasons  Tests are performed for routine health screenings or if a disease or toxicity is suspected  Lab tests may be used to determine if a medical condition is improving or worsening  Lab tests may also be used to measure the success or failure of a medication or treatment plan  Lab tests may be ordered for professional or legal reasons  You may need this test if you have:   · Complement 4 deficiency  · Hereditary angioedema  When and how often should I have this test?  When and how often laboratory tests are done may depend on many factors  The timing of laboratory tests may rely on the results or completion of other tests, procedures, or treatments  Lab tests may be performed immediately in an emergency, or tests may be delayed as a condition is treated or monitored  A test may be suggested or become necessary when certain signs or symptoms appear  Due to changes in the way your body naturally functions through the course of a day, lab tests may need to be performed at a certain time of day  If you have prepared for a test by changing your food or fluid intake, lab tests may be timed in accordance with those changes  Timing of tests may be based on increased and decreased levels of medications, drugs or other substances in the body  The age or gender of the person being tested may affect when and how often a lab test is required   Chronic or progressive conditions may need ongoing monitoring through the use of lab tests  Conditions that worsen and improve may also need frequent monitoring  Certain tests may be repeated to obtain a series of results, or tests may need to be repeated to confirm or disprove results  Timing and frequency of lab tests may vary if they are performed for professional or legal reasons  How should I get ready for the test?  Before having blood collected, tell the person drawing your blood if you are allergic to latex  Tell the healthcare worker if you have a medical condition or are using a medication or supplement that causes excessive bleeding  Also tell the healthcare worker if you have felt nauseated, lightheaded, or have fainted while having blood drawn in the past   How is the test done? When a blood sample from a vein is needed, a vein in your arm is usually selected  A tourniquet (large rubber strap) may be secured above the vein  The skin over the vein will be cleaned, and a needle will be inserted  You will be asked to hold very still while your blood is collected  Blood will be collected into one or more tubes, and the tourniquet will be removed  When enough blood has been collected, the healthcare worker will take the needle out  How will the test feel? The amount of discomfort you feel will depend on many factors, including your sensitivity to pain  Communicate how you are feeling with the person doing the test  Inform the person doing the test if you feel that you cannot continue with the test   During a blood draw, you may feel mild discomfort at the location where the blood sample is being collected  What should I do after the test?  After a blood sample is collected from your vein, a bandage, cotton ball, or gauze may be placed on the area where the needle was inserted  You may be asked to apply pressure to the area  Avoid strenuous exercise immediately after your blood draw   Contact your healthcare worker if you feel pain or see redness, swelling, or discharge from the puncture site  What are the risks? Blood: During a blood draw, a hematoma (blood-filled bump under the skin) or slight bleeding from the puncture site may occur  After a blood draw, a bruise or infection may occur at the puncture site  The person doing this test may need to perform it more than once  Talk to your healthcare worker if you have any concerns about the risks of this test   What are normal results for this test?  Laboratory test results may vary depending on your age, gender, health history, the method used for the test, and many other factors  If your results are different from the results suggested below, this may not mean that you have a disease  Contact your healthcare worker if you have any questions  The following are considered to be normal results for this test:  · Adults: 20-58 mg/dL (0 2-0 58 g/L)  · Infants, 1 month: 27 5-47 3 mg/dL (275-473 mg/L)  · Infants, 6 months: 30 5-58 7 mg/dL (305-587 mg/L)  What might affect my test results? · Results increased in:  ? Increasing body mass index (BMI)  ? Females  ? Increasing age (from age 25 to 54 years)  · Results decreased in:  ? Increasing age (after age 54 years, levels drop to those seen in third decade of life)  What follow up should I do after this test?  Ask your healthcare worker how you will be informed of the test results  You may be asked to call for results, schedule an appointment to discuss results, or notified of results by mail  Follow up care varies depending on many factors related to your test  Sometimes there is no follow up after you have been notified of test results  At other times follow up may be suggested or necessary  Some examples of follow up care include changes to medication or treatment plans, referral to a specialist, more or less frequent monitoring, and additional tests or procedures  Talk with your healthcare worker about any concerns or questions you have regarding follow up care or instructions     Where can I get more information? Related Companies   ? Chronic Fatigue and Immune Dysfunction [employer] [place] - https://www gonzales-edge com/  org    CARE AGREEMENT:   You have the right to help plan your care  To help with this plan, you must learn about your health condition and how it may be treated  You can then discuss treatment options with your caregivers  Work with them to decide what care may be used to treat you  You always have the right to refuse treatment  © Copyright [employer] 2018  Information is for End User's use only and may not be sold, redistributed or otherwise used for commercial purposes  The above information is an  only  It is not intended as a substitute for medical advice for your individual conditions or treatments  Talk to your doctor, nurse or pharmacist before following any medical regimen to see if it is safe and effective for you  Nonstress Test for Pregnancy   WHAT YOU NEED TO KNOW:   What do I need to know about a nonstress test?  A nonstress test measures your baby's heart rate and movements  Nonstress means that no stress will be placed on your baby during the test    How do I prepare for a nonstress test?  Your healthcare provider will talk to you about how to prepare for this test  He may tell you to eat and drink plenty of fluids before your test  If you smoke, you may be asked not to smoke within 2 hours before the test  He will also tell you what medicines to take or not take on the day of your test    What will happen during a nonstress test?  You may be asked to lie down or recline back for the test on a bed  One or two belts with sensors will be placed around your abdomen  Your baby's heart rate will be recorded with a machine  If your baby does not move, your baby may be asleep  Your healthcare provider may make a noise near your abdomen to try to wake your baby   The test usually takes about 20 minutes, but can take longer if your baby needs to be awakened  What do I need to know about the test results? Your baby will be expected to move at least twice for a certain amount of time  Your baby's heart rate will be expected to go up by a certain number of beats per minute during movement  If your baby does not move as expected, the test may need to be repeated or you may need other tests  CARE AGREEMENT:   You have the right to help plan your care  Learn about your health condition and how it may be treated  Discuss treatment options with your caregivers to decide what care you want to receive  You always have the right to refuse treatment  The above information is an  only  It is not intended as medical advice for individual conditions or treatments  Talk to your doctor, nurse or pharmacist before following any medical regimen to see if it is safe and effective for you  © 2017 [address] [employer] Information is for End User's use only and may not be sold, redistributed or otherwise used for commercial purposes  All illustrations and images included in CareNotes® are the copyrighted property of [employer]  or [employer]  Kick Counts in Pregnancy   AMBULATORY CARE:   Kick counts  measure how much your baby is moving in your womb  A kick from your baby can be felt as a twist, turn, swish, roll, or jab  It is common to feel your baby kicking at 26 to 28 weeks of pregnancy  You may feel your baby kick as early as 20 weeks of pregnancy  Seek care immediately if:   · You feel your baby kick less as the day goes on      · You do not feel any kicks in a day  Contact your healthcare provider if:   · You feel a change in the number of kicks or movements of your baby  · You feel fewer than 10 kicks within 2 hours after counting twice  · You have questions or concerns about your baby's movements  Why measure kick counts:  Your baby's movement may provide information about your baby's health   He may move less, or not at all, if there are problems  He may move less if he does not have enough room to grow in your uterus (womb)  He may also move less if he is not getting enough oxygen or nutrition from the placenta  Tell your healthcare provider as soon as you feel a change in your baby's movements  Problems that are found earlier are easier to treat  When to measure kick counts:   · Measure kick counts at the same time every day  · Measure kick counts when your baby is awake and most active  Your baby may be most active in the evening  · Measure kick counts after a meal or snack  Your baby may be more active after you eat  Wait 2 hours after you drink liquids that contain caffeine  Caffeine can make your baby more active than usual     · You should not smoke while you are pregnant  Smoking increases the risk of health problems for you and for your baby during your pregnancy  If you do smoke, wait 2 hours to measure kick counts  Nicotine can make your baby more active than usual   How to measure kick counts:  Check that your baby is awake before you measure kick counts  You can wake up your baby by lightly pushing on your belly, walking, or drinking something cold  Your healthcare provider may tell you different ways to measure kick counts  He may tell you to do the following:  · Use a chart or clock to keep track of the time you start and finish counting  · Sit in a chair or lie on your left side  · Place your hands on the largest part of your belly  · Count until you reach 10 kicks  Write down how much time it takes to count 10 kicks  · It may take 30 minutes to 2 hours to count 10 kicks  It should not take more than 2 hours to count 10 kicks  · If you do not feel 10 kicks within 2 hours, wait 1 hour and count again  Your baby can sleep for up to 40 minutes at one time  Follow up with your healthcare provider as directed:  Write down your questions so you remember to ask them during your visits     © 2017 [facility] [facility] Information is for End User's use only and may not be sold, redistributed or otherwise used for commercial purposes  All illustrations and images included in CareNotes® are the copyrighted property of [employer] D A M [employer] Inc  or [employer]  The above information is an  only  It is not intended as medical advice for individual conditions or treatments  Talk to your doctor, nurse or pharmacist before following any medical regimen to see if it is safe and effective for you

## 2019-08-01 ENCOUNTER — TELEPHONE (OUTPATIENT)
Dept: OBGYN CLINIC | Facility: CLINIC | Age: 29
End: 2019-08-01

## 2019-08-01 ENCOUNTER — ROUTINE PRENATAL (OUTPATIENT)
Dept: OBGYN CLINIC | Facility: CLINIC | Age: 29
End: 2019-08-01

## 2019-08-01 VITALS — SYSTOLIC BLOOD PRESSURE: 122 MMHG | BODY MASS INDEX: 35.71 KG/M2 | DIASTOLIC BLOOD PRESSURE: 80 MMHG | WEIGHT: 241.8 LBS

## 2019-08-01 DIAGNOSIS — O48.0 POST-TERM PREGNANCY, 40-42 WEEKS OF GESTATION: ICD-10-CM

## 2019-08-01 DIAGNOSIS — Z34.80 SUPERVISION OF OTHER NORMAL PREGNANCY, ANTEPARTUM: Primary | ICD-10-CM

## 2019-08-01 DIAGNOSIS — O09.899 HIGH RISK PREGNANCY WITH HIGH INHIBIN: ICD-10-CM

## 2019-08-01 DIAGNOSIS — O28.8 HIGH RISK PREGNANCY WITH HIGH INHIBIN: ICD-10-CM

## 2019-08-01 PROCEDURE — PNV: Performed by: NURSE PRACTITIONER

## 2019-08-01 NOTE — TELEPHONE ENCOUNTER
Dr NAIR ok'd the IOL for tomorrw night 2000 with cervical ripening  Placed call to pt but she was driving and asked that I call her back around 1300 to review information once she is home

## 2019-08-01 NOTE — ASSESSMENT & PLAN NOTE
Had NST last week, may need another, also discussed what to expect with induction process  Note sent to nursing staff for scheduling   Declines exam, was checked last week by Dr Roger Dennis , SVE was closed

## 2019-08-01 NOTE — PATIENT INSTRUCTIONS
Induction of Labor   WHAT YOU NEED TO KNOW:   What is induction of labor? Induction of labor is a procedure to induce (start) your labor before it begins on its own  Medicines and other methods are used to start contractions and help your cervix soften, thin (efface), and dilate (open)  Why might I need induction of labor? · Health problems you have, such as high blood pressure or diabetes    · Health problems your baby has, such as a slow heartbeat or poor growth inside the womb     · Problems related to your pregnancy such as infection of the amniotic fluid, your water breaks before labor begins, or you have too little amniotic fluid    · You are past your due date  What happens during induction of labor? Your healthcare provider may use one or more of the following to induce labor:  · Cervical ripening  is a process that helps to soften and thin out your cervix  Medicines called prostaglandins may be used to ripen your cervix  These medicines can be inserted into your vagina or taken as a pill  Other methods can also be used to dilate the cervix  This includes a catheter with an inflatable balloon on the end that is inserted into your cervix  Saline injected through the catheter helps the balloon to expand  A substance that absorbs water may also be inserted into your cervix to help dilate it  · Stripping the membranes  is a procedure that causes your body to release prostaglandins naturally  Prostaglandins soften the cervix and may help to cause contractions  Your healthcare provider will sweep a gloved finger over the membranes that connect the amniotic sac to the uterus wall  · Rupturing the amniotic sac  is a procedure that is used to cause your water to break  Your healthcare provider will use a small tool to make a hole in your amniotic sac  This may help contractions to start  · Oxytocin  may be given through an IV to cause contractions to start and stay strong and regular    What are the risks of induction of labor? Medicines used to induce labor may cause too many contractions  This can lower your baby's heartbeat and decrease his or her oxygen supply  Induction of labor also increases the risk of umbilical cord prolapse  This condition causes the umbilical cord to slip back into the vagina before delivery  It can compress the cord and decrease your baby's oxygen supply  Medical induction may cause an infection in you or your baby  Medical induction may also increase your risk for a , especially if it is the first time you give birth  Your uterus may rupture if you have had a  section () before  CARE AGREEMENT:   You have the right to help plan your care  Learn about your health condition and how it may be treated  Discuss treatment options with your caregivers to decide what care you want to receive  You always have the right to refuse treatment  The above information is an  only  It is not intended as medical advice for individual conditions or treatments  Talk to your doctor, nurse or pharmacist before following any medical regimen to see if it is safe and effective for you  ©  26[address] [facility] Information is for End User's use only and may not be sold, redistributed or otherwise used for commercial purposes  All illustrations and images included in CareNotes® are the copyrighted property of A D A M [employer] Inc  or Peter Reynolds

## 2019-08-01 NOTE — TELEPHONE ENCOUNTER
----- Message from [address] sent at 8/1/2019 11:30 AM EDT -----  Please schedule for IOL, NSTs if needed for post dates

## 2019-08-01 NOTE — TELEPHONE ENCOUNTER
Spoke to [facility], informed of 2000 arrival tomorrow night 8/2/19 to begin her IOL with cervical ripening  Address given and also L&D phone number  Pt advised to call about 1800 to confirm bed availability

## 2019-08-01 NOTE — ASSESSMENT & PLAN NOTE
Return OB  Denies LOF, vag blding, ctxs, occasional cramping and lower back pain, and reports good FM  Taking PNV daily as prescribed  Denies any problems today  Reviewed what to expect with IOL process  Also given tips to help promote labor, walking, intercourse, etc   Reviewed Labor precautions, [address] also reviewed  Note sent to nurses to set up IOL

## 2019-08-01 NOTE — TELEPHONE ENCOUNTER
----- Message from [address] sent at 8/1/2019 11:46 AM EDT -----  Would like to schedule IOL for post dates, she is 41w 3d today  G1, SVE closed/thick/high  Had one NST not sure if she would need another  Definitely add if protocol states  Thanks!

## 2019-08-01 NOTE — PROGRESS NOTES
Supervision of other normal pregnancy, antepartum  Return OB  Denies LOF, vag blding, ctxs, occasional cramping and lower back pain, and reports good FM  Taking PNV daily as prescribed  Denies any problems today  Reviewed what to expect with IOL process  Also given tips to help promote labor, walking, intercourse, etc   Reviewed Labor precautions, [address] also reviewed  Note sent to nurses to set up IOL  Post-term pregnancy, 40-42 weeks of gestation  Had NST last week, may need another, also discussed what to expect with induction process  Note sent to nursing staff for scheduling   Declines exam, was checked last week by Dr Neris Saravia , S[facility] was closed

## 2019-08-01 NOTE — PROGRESS NOTES
Patient is here for routine prenatal visit with no concerns  39 w 3 d     Patient denies any loss of fluid, bleeding  GFM,      POCT urine today shows trace of Protein negative for Glucose

## 2019-08-01 NOTE — TELEPHONE ENCOUNTER
Spoke to L&D, the next spot open is tomorrow 8/2 night at 2000 for cervical ripening  TT to Dr NAIR to seee if he is ok with, he is on call for [facility] sat also but not SOD   Will await his response

## 2019-08-01 NOTE — Clinical Note
Would like to schedule IOL for post dates, she is 41w 3d today  G1, SVE closed/thick/high  Had one NST not sure if she would need another  Definitely add if protocol states  Thanks!

## 2019-08-02 ENCOUNTER — HOSPITAL ENCOUNTER (OUTPATIENT)
Dept: LABOR AND DELIVERY | Facility: HOSPITAL | Age: 29
Discharge: HOME | End: 2019-08-02
Payer: COMMERCIAL

## 2019-08-02 ENCOUNTER — HOSPITAL ENCOUNTER (INPATIENT)
Facility: HOSPITAL | Age: 29
LOS: 4 days | Discharge: HOME | End: 2019-08-06
Attending: OBSTETRICS & GYNECOLOGY | Admitting: OBSTETRICS & GYNECOLOGY
Payer: COMMERCIAL

## 2019-08-02 DIAGNOSIS — O48.0 POST-TERM PREGNANCY, 40-42 WEEKS OF GESTATION: Primary | ICD-10-CM

## 2019-08-02 LAB
ABO GROUP BLD: NORMAL
BASOPHILS # BLD AUTO: 0.04 THOUSANDS/ΜL (ref 0–0.1)
BASOPHILS NFR BLD AUTO: 0 % (ref 0–1)
BLD GP AB SCN SERPL QL: NEGATIVE
EOSINOPHIL # BLD AUTO: 0.1 THOUSAND/ΜL (ref 0–0.61)
EOSINOPHIL NFR BLD AUTO: 1 % (ref 0–6)
ERYTHROCYTE [DISTWIDTH] IN BLOOD BY AUTOMATED COUNT: 12.7 % (ref 11.6–15.1)
HCT VFR BLD AUTO: 32.8 % (ref 34.8–46.1)
HGB BLD-MCNC: 11.2 G/DL (ref 11.5–15.4)
IMM GRANULOCYTES # BLD AUTO: 0.06 THOUSAND/UL (ref 0–0.2)
IMM GRANULOCYTES NFR BLD AUTO: 1 % (ref 0–2)
LYMPHOCYTES # BLD AUTO: 3.01 THOUSANDS/ΜL (ref 0.6–4.47)
LYMPHOCYTES NFR BLD AUTO: 31 % (ref 14–44)
MCH RBC QN AUTO: 29.5 PG (ref 26.8–34.3)
MCHC RBC AUTO-ENTMCNC: 34.1 G/DL (ref 31.4–37.4)
MCV RBC AUTO: 86 FL (ref 82–98)
MONOCYTES # BLD AUTO: 0.63 THOUSAND/ΜL (ref 0.17–1.22)
MONOCYTES NFR BLD AUTO: 6 % (ref 4–12)
NEUTROPHILS # BLD AUTO: 6 THOUSANDS/ΜL (ref 1.85–7.62)
NEUTS SEG NFR BLD AUTO: 61 % (ref 43–75)
NRBC BLD AUTO-RTO: 0 /100 WBCS
PLATELET # BLD AUTO: 218 THOUSANDS/UL (ref 149–390)
PMV BLD AUTO: 11.1 FL (ref 8.9–12.7)
RBC # BLD AUTO: 3.8 MILLION/UL (ref 3.81–5.12)
RH BLD: POSITIVE
SPECIMEN EXPIRATION DATE: NORMAL
WBC # BLD AUTO: 9.84 THOUSAND/UL (ref 4.31–10.16)

## 2019-08-02 PROCEDURE — 3E033VJ INTRODUCTION OF OTHER HORMONE INTO PERIPHERAL VEIN, PERCUTANEOUS APPROACH: ICD-10-PCS | Performed by: OBSTETRICS & GYNECOLOGY

## 2019-08-02 PROCEDURE — 4A1HXCZ MONITORING OF PRODUCTS OF CONCEPTION, CARDIAC RATE, EXTERNAL APPROACH: ICD-10-PCS | Performed by: OBSTETRICS & GYNECOLOGY

## 2019-08-02 PROCEDURE — 86900 BLOOD TYPING SEROLOGIC ABO: CPT | Performed by: OBSTETRICS & GYNECOLOGY

## 2019-08-02 PROCEDURE — 86850 RBC ANTIBODY SCREEN: CPT | Performed by: OBSTETRICS & GYNECOLOGY

## 2019-08-02 PROCEDURE — 3E0P7VZ INTRODUCTION OF HORMONE INTO FEMALE REPRODUCTIVE, VIA NATURAL OR ARTIFICIAL OPENING: ICD-10-PCS | Performed by: OBSTETRICS & GYNECOLOGY

## 2019-08-02 PROCEDURE — 86592 SYPHILIS TEST NON-TREP QUAL: CPT | Performed by: OBSTETRICS & GYNECOLOGY

## 2019-08-02 PROCEDURE — NC001 PR NO CHARGE: Performed by: OBSTETRICS & GYNECOLOGY

## 2019-08-02 PROCEDURE — 85025 COMPLETE CBC W/AUTO DIFF WBC: CPT | Performed by: OBSTETRICS & GYNECOLOGY

## 2019-08-02 PROCEDURE — 86901 BLOOD TYPING SEROLOGIC RH(D): CPT | Performed by: OBSTETRICS & GYNECOLOGY

## 2019-08-02 RX ORDER — SODIUM CHLORIDE, SODIUM LACTATE, POTASSIUM CHLORIDE, CALCIUM CHLORIDE 600; 310; 30; 20 MG/100ML; MG/100ML; MG/100ML; MG/100ML
125 INJECTION, SOLUTION INTRAVENOUS CONTINUOUS
Status: DISCONTINUED | OUTPATIENT
Start: 2019-08-02 | End: 2019-08-04

## 2019-08-02 RX ADMIN — MISOPROSTOL 25 MCG: 100 TABLET ORAL at 22:35

## 2019-08-03 ENCOUNTER — ANESTHESIA (INPATIENT)
Dept: ANESTHESIOLOGY | Facility: HOSPITAL | Age: 29
End: 2019-08-03
Payer: COMMERCIAL

## 2019-08-03 ENCOUNTER — ANESTHESIA EVENT (INPATIENT)
Dept: ANESTHESIOLOGY | Facility: HOSPITAL | Age: 29
End: 2019-08-03
Payer: COMMERCIAL

## 2019-08-03 LAB
AMPHETAMINES SERPL QL SCN: NEGATIVE
BARBITURATES UR QL: NEGATIVE
BENZODIAZ UR QL: NEGATIVE
COCAINE UR QL: NEGATIVE
METHADONE UR QL: NEGATIVE
OPIATES UR QL SCN: NEGATIVE
PCP UR QL: NEGATIVE
THC UR QL: NEGATIVE

## 2019-08-03 PROCEDURE — 80307 DRUG TEST PRSMV CHEM ANLYZR: CPT | Performed by: OBSTETRICS & GYNECOLOGY

## 2019-08-03 RX ORDER — ROPIVACAINE HYDROCHLORIDE 2 MG/ML
INJECTION, SOLUTION EPIDURAL; INFILTRATION; PERINEURAL AS NEEDED
Status: DISCONTINUED | OUTPATIENT
Start: 2019-08-03 | End: 2019-08-04 | Stop reason: SURG

## 2019-08-03 RX ORDER — LIDOCAINE HYDROCHLORIDE AND EPINEPHRINE 15; 5 MG/ML; UG/ML
INJECTION, SOLUTION EPIDURAL
Status: COMPLETED | OUTPATIENT
Start: 2019-08-03 | End: 2019-08-03

## 2019-08-03 RX ORDER — ONDANSETRON 2 MG/ML
4 INJECTION INTRAMUSCULAR; INTRAVENOUS EVERY 6 HOURS PRN
Status: DISCONTINUED | OUTPATIENT
Start: 2019-08-03 | End: 2019-08-04

## 2019-08-03 RX ORDER — OXYTOCIN/RINGER'S LACTATE 30/500 ML
1-30 PLASTIC BAG, INJECTION (ML) INTRAVENOUS
Status: DISCONTINUED | OUTPATIENT
Start: 2019-08-03 | End: 2019-08-04

## 2019-08-03 RX ORDER — CALCIUM CARBONATE 200(500)MG
500 TABLET,CHEWABLE ORAL DAILY PRN
Status: DISCONTINUED | OUTPATIENT
Start: 2019-08-03 | End: 2019-08-04

## 2019-08-03 RX ORDER — FENTANYL CITRATE 50 UG/ML
INJECTION, SOLUTION INTRAMUSCULAR; INTRAVENOUS AS NEEDED
Status: DISCONTINUED | OUTPATIENT
Start: 2019-08-03 | End: 2019-08-04 | Stop reason: SURG

## 2019-08-03 RX ORDER — FENTANYL CITRATE 50 UG/ML
INJECTION, SOLUTION INTRAMUSCULAR; INTRAVENOUS
Status: COMPLETED
Start: 2019-08-03 | End: 2019-08-03

## 2019-08-03 RX ADMIN — ROPIVACAINE HYDROCHLORIDE 6 ML: 2 INJECTION, SOLUTION EPIDURAL; INFILTRATION at 17:19

## 2019-08-03 RX ADMIN — SODIUM CHLORIDE, SODIUM LACTATE, POTASSIUM CHLORIDE, AND CALCIUM CHLORIDE 125 ML/HR: .6; .31; .03; .02 INJECTION, SOLUTION INTRAVENOUS at 18:39

## 2019-08-03 RX ADMIN — FENTANYL CITRATE 25 MCG: 50 INJECTION, SOLUTION INTRAMUSCULAR; INTRAVENOUS at 17:19

## 2019-08-03 RX ADMIN — Medication 2 MILLI-UNITS/MIN: at 10:46

## 2019-08-03 RX ADMIN — MISOPROSTOL 25 MCG: 100 TABLET ORAL at 03:14

## 2019-08-03 RX ADMIN — LIDOCAINE HYDROCHLORIDE AND EPINEPHRINE 3 ML: 15; 5 INJECTION, SOLUTION EPIDURAL at 17:16

## 2019-08-03 RX ADMIN — ROPIVACAINE HYDROCHLORIDE 6 ML: 2 INJECTION, SOLUTION EPIDURAL; INFILTRATION at 17:24

## 2019-08-03 RX ADMIN — CALCIUM CARBONATE (ANTACID) CHEW TAB 500 MG 500 MG: 500 CHEW TAB at 11:02

## 2019-08-03 RX ADMIN — SODIUM CHLORIDE, SODIUM LACTATE, POTASSIUM CHLORIDE, AND CALCIUM CHLORIDE 125 ML/HR: .6; .31; .03; .02 INJECTION, SOLUTION INTRAVENOUS at 10:46

## 2019-08-03 RX ADMIN — FENTANYL CITRATE 25 MCG: 50 INJECTION, SOLUTION INTRAMUSCULAR; INTRAVENOUS at 17:24

## 2019-08-03 RX ADMIN — ROPIVACAINE HYDROCHLORIDE: 2 INJECTION, SOLUTION EPIDURAL; INFILTRATION at 17:30

## 2019-08-03 RX ADMIN — CALCIUM CARBONATE (ANTACID) CHEW TAB 500 MG 500 MG: 500 CHEW TAB at 00:49

## 2019-08-03 RX ADMIN — CALCIUM CARBONATE (ANTACID) CHEW TAB 500 MG 500 MG: 500 CHEW TAB at 18:48

## 2019-08-03 NOTE — OB LABOR/OXYTOCIN SAFETY PROGRESS
Oxytocin Safety Progress Check Note - Joanne Wing 34 y o  female MRN: [id number]    Unit/Bed#: -01 Encounter: [id number]    OB History    Para Term  AB Living   2 0 0 0 1 0   SAB TAB Ectopic Multiple Live Births   0 0 0 0 0     Gestational Age: 41w5d  Dose (grace-units/min) Oxytocin: 10 grace-units/min  Contraction Frequency (minutes): 1 5-2  Contraction Quality: Mild  Tachysystole: No   Dilation: 4        Effacement (%): 70  Station: -1  Baseline Rate: 130 bpm  Fetal Heart Rate: 136 BPM  FHR Category: Category I     Oxytocin Safety Progress Check: Safety check completed    Notes/comments:   Patient beginning to feel more uncomfortable, will consider epidural later  Pitocin at 10  Continue pitocin titration          Corinna Gore MD 8/3/2019 12:26 PM

## 2019-08-03 NOTE — ANESTHESIA PREPROCEDURE EVALUATION
35 yo  at 41+5 undergoing IOL, requesting labor analgesia  Review of Systems/Medical History  Patient summary reviewed  Chart reviewed  No history of anesthetic complications     Cardiovascular  Negative cardio ROS Exercise tolerance (METS): >4,     Pulmonary  Asthma (childhood) ,        GI/Hepatic  Negative GI/hepatic ROS          Negative  ROS        Endo/Other    Obesity (BMI 35)    GYN  Currently pregnant , Prior pregnancy/OB history : 2 Parity: 0,          Hematology  Negative hematology ROS      Musculoskeletal  Negative musculoskeletal ROS        Neurology    Headaches,   Comment: Autism  Psychology   Negative psychology ROS              Physical Exam    Airway    Mallampati score: I         Dental       Cardiovascular  Comment: Negative ROS,     Pulmonary      Other Findings      Lab Results   Component Value Date    HGB 11 2 (L) 2019     2019         Anesthesia Plan  ASA Score- 2     Anesthesia Type- epidural and spinal with ASA Monitors  Additional Monitors:   Airway Plan:         Plan Factors-    Induction-     Postoperative Plan-     Informed Consent- Anesthetic plan and risks discussed with patient

## 2019-08-03 NOTE — OB LABOR/OXYTOCIN SAFETY PROGRESS
Labor Progress Note - Keyona Barillas 34 y o  female MRN: [id number]    Unit/Bed#: LD Triage 3 Encounter: [id number]    OB History    Para Term  AB Living   2 0 0 0 1 0   SAB TAB Ectopic Multiple Live Births   0 0 0 0 0     Gestational Age: 44w9d     Contraction Frequency (minutes): 7  Contraction Quality: Mild  Tachysystole: No   Dilation: Closed        Effacement (%): 50  Station: -3  Baseline Rate: 120 bpm  Fetal Heart Rate: 135 BPM  FHR Category: Category I     Oxytocin Safety Progress Check: Safety check completed    Notes/comments:   Patient is comfortable RO exam shown as above for cervical ripening 1 more dose Cytotec inserted as vaginally    Fetal heart tone 130 category 1 toco 7-8          Isabel Lyons MD [id number] 1:45 AM

## 2019-08-03 NOTE — OB LABOR/OXYTOCIN SAFETY PROGRESS
Oxytocin Safety Progress Check Note - Caral Barton 34 y o  female MRN: [id number]    Unit/Bed#: -01 Encounter: [id number]    OB History    Para Term  AB Living   2 0 0 0 1 0   SAB TAB Ectopic Multiple Live Births   0 0 0 0 0     Gestational Age: 41w5d  Dose (grace-units/min) Oxytocin: 18 grace-units/min  Contraction Frequency (minutes): 2-5  Contraction Quality: Moderate  Tachysystole: No   Dilation: 7        Effacement (%): 90  Station: -1  Baseline Rate: 120 bpm  Fetal Heart Rate: 121 BPM  FHR Category: Category I     Oxytocin Safety Progress Check: Safety check completed    Notes/comments:     SROM for scant, clear fluid  Patient comfortable after receiving epidural  Continue pitocin titration          Elma Villavicencio MD 8/3/2019 6:34 PM

## 2019-08-03 NOTE — OB LABOR/OXYTOCIN SAFETY PROGRESS
Labor Progress Note - Gomez Francois 34 y o  female MRN: [id number]    Unit/Bed#:  Triage 3-01 Encounter: [id number]    OB History    Para Term  AB Living   2 0 0 0 1 0   SAB TAB Ectopic Multiple Live Births   0 0 0 0 0     Gestational Age: 44w9d     Contraction Frequency (minutes): irregular  Contraction Quality: Mild  Tachysystole: No   Dilation: 2-3        Effacement (%): 60  Station: -2  Baseline Rate: 120 bpm  Fetal Heart Rate: 130 BPM  FHR Category: Category I     Oxytocin Safety Progress Check: Safety check completed    Notes/comments:      Patient made change, will recheck in 2 hours  Continue expectant management       Iris Robles MD 8/3/2019 8:29 AM

## 2019-08-03 NOTE — ANESTHESIA PROCEDURE NOTES
Epidural Block    Patient location during procedure: OB  Start time: 8/3/2019 5:16 PM  Reason for block: at surgeon's request and post-op pain management  Staffing  Anesthesiologist: Aurelio Mckeon MD  Performed: anesthesiologist   Preanesthetic Checklist  Completed: patient identified, site marked, surgical consent, pre-op evaluation, timeout performed, IV checked, risks and benefits discussed and monitors and equipment checked  Epidural  Patient position: sitting  Prep: Betadine and site prepped and draped  Patient monitoring: heart rate, continuous pulse ox and frequent blood pressure checks  Approach: midline  Location: lumbar (1-5) (L4/5)  Injection technique: YASH saline  Needle  Needle type: Tuohy   Epidural needle gauge: 17 g  Catheter type: side hole  Catheter size: 19 g springwound  Catheter at skin depth: 13 5 cm  Test dose: negativelidocaine 1 5% with epinephrine 1:200,000 test dose, 3 mL  Assessment  Events: paresthesianegative aspiration for CSF and negative aspiration for heme  patient tolerated the procedure well with no immediate complications  Additional Notes  Pt positioned sitting, timeout, sterile prep and drape  Placement x 1 attempted level, 2 passes due to very firm ligament at L 4/5 with YASH to NS  Attempted CSE but left parasthesia so elected to thread catheter  Cath threaded easily, negative aspiration and test, catheter dosed incrementally  Patient laid supine with STELLA, PCEA initiated, + relief

## 2019-08-03 NOTE — OB LABOR/OXYTOCIN SAFETY PROGRESS
Oxytocin Safety Progress Check Note - Fabien Gloss 34 y o  female MRN: [id number]    Unit/Bed#: -01 Encounter: [id number]    OB History    Para Term  AB Living   2 0 0 0 1 0   SAB TAB Ectopic Multiple Live Births   0 0 0 0 0     Gestational Age: 41w5d  Dose (grace-units/min) Oxytocin: 16 grace-units/min  Contraction Frequency (minutes): 5  Contraction Quality: Mild  Tachysystole: No   Dilation: 6        Effacement (%): 90  Station: -1  Baseline Rate: 130 bpm  Fetal Heart Rate: 135 BPM  FHR Category: Category I     Oxytocin Safety Progress Check: Safety check completed    Notes/comments:     Patient comfortable  Pitocin at 16  Will consider epidural later  Continue pitocin titration          Jessica Mcdonough MD 8/3/2019 2:47 PM

## 2019-08-03 NOTE — PLAN OF CARE
Problem: BIRTH - VAGINAL/ SECTION  Goal: Fetal and maternal status remain reassuring during the birth process  Description  INTERVENTIONS:  - Monitor vital signs  - Monitor fetal heart rate  - Monitor uterine activity  - Monitor labor progression (vaginal delivery)  - DVT prophylaxis  - Antibiotic prophylaxis  Outcome: Progressing  Goal: Emotionally satisfying birthing experience for mother/fetus  Description  Interventions:  - Assess, plan, implement and evaluate the nursing care given to the patient in labor  - Advocate the philosophy that each childbirth experience is a unique experience and support the family's chosen level of involvement and control during the labor process   - Actively participate in both the patient's and family's teaching of the birth process  - Consider cultural, [place] and age-specific factors and plan care for the patient in labor  Outcome: Progressing     Problem: Knowledge Deficit  Goal: Verbalizes understanding of labor plan  Description  Assess patient/family/caregiver's baseline knowledge level and ability to understand information  Provide education via patient/family/caregiver's preferred learning method at appropriate level of understanding  1  Provide teaching at level of understanding  2  Provide teaching via preferred learning method(s)  Outcome: Progressing  Goal: Patient/family/caregiver demonstrates understanding of disease process, treatment plan, medications, and discharge instructions  Description  Complete learning assessment and assess knowledge base  Interventions:  - Provide teaching at level of understanding  - Provide teaching via preferred learning methods  Outcome: Progressing     Problem: Labor & Delivery  Goal: Manages discomfort  Description  Assess and monitor for signs and symptoms of discomfort  Assess patient's pain level regularly and per hospital policy  Administer medications as ordered   Support use of nonpharmacological methods to help control pain such as distraction, imagery, relaxation, and application of heat and cold  Collaborate with interdisciplinary team and patient to determine appropriate pain management plan  1  Include patient in decisions related to comfort  2  Offer non-pharmacological pain management interventions  3  Report ineffective pain management to physician  Outcome: Progressing  Goal: Patient vital signs are stable  Description  1  Assess vital signs - vaginal delivery    Outcome: Progressing     Problem: PAIN - ADULT  Goal: Verbalizes/displays adequate comfort level or baseline comfort level  Description  Interventions:  - Encourage patient to monitor pain and request assistance  - Assess pain using appropriate pain scale  - Administer analgesics based on type and severity of pain and evaluate response  - Implement non-pharmacological measures as appropriate and evaluate response  - Consider cultural and social influences on pain and pain management  - Notify physician/advanced practitioner if interventions unsuccessful or patient reports new pain  Outcome: Progressing     Problem: INFECTION - ADULT  Goal: Absence or prevention of progression during hospitalization  Description  INTERVENTIONS:  - Assess and monitor for signs and symptoms of infection  - Monitor lab/diagnostic results  - Monitor all insertion sites, i e  indwelling lines, tubes, and drains  - Monitor endotracheal (as able) and nasal secretions for changes in amount and color  - [place] appropriate cooling/warming therapies per order  - Administer medications as ordered  - Instruct and encourage patient and family to use good hand hygiene technique  - Identify and instruct in appropriate isolation precautions for identified infection/condition  Outcome: Progressing  Goal: Absence of fever/infection during neutropenic period  Description  INTERVENTIONS:  - Monitor WBC  - Implement neutropenic guidelines  Outcome: Progressing     Problem: SAFETY ADULT  Goal: Patient will remain free of falls  Description  INTERVENTIONS:  - Assess patient frequently for physical needs  -  Identify cognitive and physical deficits and behaviors that affect risk of falls    -  [place] fall precautions as indicated by assessment   - Educate patient/family on patient safety including physical limitations  - Instruct patient to call for assistance with activity based on assessment  - Modify environment to reduce risk of injury  - Consider OT/PT consult to assist with strengthening/mobility  Outcome: Progressing  Goal: Maintain or return to baseline ADL function  Description  INTERVENTIONS:  -  Assess patient's ability to carry out ADLs; assess patient's baseline for ADL function and identify physical deficits which impact ability to perform ADLs (bathing, care of mouth/teeth, toileting, grooming, dressing, etc )  - Assess/evaluate cause of self-care deficits   - Assess range of motion  - Assess patient's mobility; develop plan if impaired  - Assess patient's need for assistive devices and provide as appropriate  - Encourage maximum independence but intervene and supervise when necessary  ¯ Involve family in performance of ADLs  ¯ Assess for home care needs following discharge   ¯ Request OT consult to assist with ADL evaluation and planning for discharge  ¯ Provide patient education as appropriate  Outcome: Progressing  Goal: Maintain or return mobility status to optimal level  Description  INTERVENTIONS:  - Assess patient's baseline mobility status (ambulation, transfers, stairs, etc )    - Identify cognitive and physical deficits and behaviors that affect mobility  - Identify mobility aids required to assist with transfers and/or ambulation (gait belt, sit-to-stand, lift, walker, cane, etc )  - [place] fall precautions as indicated by assessment  - Record patient progress and toleration of activity level on Mobility SBAR; progress patient to next Phase/Stage  - Instruct patient to call for assistance with activity based on assessment  - Request Rehabilitation consult to assist with strengthening/weightbearing, etc   Outcome: Progressing     Problem: DISCHARGE PLANNING  Goal: Discharge to home or other facility with appropriate resources  Description  INTERVENTIONS:  - Identify barriers to discharge w/patient and caregiver  - Arrange for needed discharge resources and transportation as appropriate  - Identify discharge learning needs (meds, wound care, etc )  - Arrange for interpretive services to assist at discharge as needed  - Refer to Case Management Department for coordinating discharge planning if the patient needs post-hospital services based on physician/advanced practitioner order or complex needs related to functional status, cognitive ability, or social support system  Outcome: Progressing

## 2019-08-03 NOTE — PLAN OF CARE
Problem: BIRTH - VAGINAL/ SECTION  Goal: Fetal and maternal status remain reassuring during the birth process  Description  INTERVENTIONS:  - Monitor vital signs  - Monitor fetal heart rate  - Monitor uterine activity  - Monitor labor progression (vaginal delivery)  - DVT prophylaxis  - Antibiotic prophylaxis  Outcome: Progressing  Goal: Emotionally satisfying birthing experience for mother/fetus  Description  Interventions:  - Assess, plan, implement and evaluate the nursing care given to the patient in labor  - Advocate the philosophy that each childbirth experience is a unique experience and support the family's chosen level of involvement and control during the labor process   - Actively participate in both the patient's and family's teaching of the birth process  - Consider cultural, Gnosticism and age-specific factors and plan care for the patient in labor  Outcome: Progressing     Problem: Knowledge Deficit  Goal: Verbalizes understanding of labor plan  Description  Assess patient/family/caregiver's baseline knowledge level and ability to understand information  Provide education via patient/family/caregiver's preferred learning method at appropriate level of understanding  1  Provide teaching at level of understanding  2  Provide teaching via preferred learning method(s)  Outcome: Progressing  Goal: Patient/family/caregiver demonstrates understanding of disease process, treatment plan, medications, and discharge instructions  Description  Complete learning assessment and assess knowledge base  Interventions:  - Provide teaching at level of understanding  - Provide teaching via preferred learning methods  Outcome: Progressing     Problem: Labor & Delivery  Goal: Manages discomfort  Description  Assess and monitor for signs and symptoms of discomfort  Assess patient's pain level regularly and per hospital policy  Administer medications as ordered   Support use of nonpharmacological methods to help control pain such as distraction, imagery, relaxation, and application of heat and cold  Collaborate with interdisciplinary team and patient to determine appropriate pain management plan  1  Include patient in decisions related to comfort  2  Offer non-pharmacological pain management interventions  3  Report ineffective pain management to physician  Outcome: Progressing  Goal: Patient vital signs are stable  Description  1  Assess vital signs - vaginal delivery    Outcome: Progressing     Problem: PAIN - ADULT  Goal: Verbalizes/displays adequate comfort level or baseline comfort level  Description  Interventions:  - Encourage patient to monitor pain and request assistance  - Assess pain using appropriate pain scale  - Administer analgesics based on type and severity of pain and evaluate response  - Implement non-pharmacological measures as appropriate and evaluate response  - Consider cultural and social influences on pain and pain management  - Notify physician/advanced practitioner if interventions unsuccessful or patient reports new pain  Outcome: Progressing     Problem: INFECTION - ADULT  Goal: Absence or prevention of progression during hospitalization  Description  INTERVENTIONS:  - Assess and monitor for signs and symptoms of infection  - Monitor lab/diagnostic results  - Monitor all insertion sites, i e  indwelling lines, tubes, and drains  - Monitor endotracheal (as able) and nasal secretions for changes in amount and color  - [place] appropriate cooling/warming therapies per order  - Administer medications as ordered  - Instruct and encourage patient and family to use good hand hygiene technique  - Identify and instruct in appropriate isolation precautions for identified infection/condition  Outcome: Progressing  Goal: Absence of fever/infection during neutropenic period  Description  INTERVENTIONS:  - Monitor WBC  - Implement neutropenic guidelines  Outcome: Progressing     Problem: SAFETY ADULT  Goal: Patient will remain free of falls  Description  INTERVENTIONS:  - Assess patient frequently for physical needs  -  Identify cognitive and physical deficits and behaviors that affect risk of falls    -  [place] fall precautions as indicated by assessment   - Educate patient/family on patient safety including physical limitations  - Instruct patient to call for assistance with activity based on assessment  - Modify environment to reduce risk of injury  - Consider OT/PT consult to assist with strengthening/mobility  Outcome: Progressing  Goal: Maintain or return to baseline ADL function  Description  INTERVENTIONS:  -  Assess patient's ability to carry out ADLs; assess patient's baseline for ADL function and identify physical deficits which impact ability to perform ADLs (bathing, care of mouth/teeth, toileting, grooming, dressing, etc )  - Assess/evaluate cause of self-care deficits   - Assess range of motion  - Assess patient's mobility; develop plan if impaired  - Assess patient's need for assistive devices and provide as appropriate  - Encourage maximum independence but intervene and supervise when necessary  ¯ Involve family in performance of ADLs  ¯ Assess for home care needs following discharge   ¯ Request OT consult to assist with ADL evaluation and planning for discharge  ¯ Provide patient education as appropriate  Outcome: Progressing  Goal: Maintain or return mobility status to optimal level  Description  INTERVENTIONS:  - Assess patient's baseline mobility status (ambulation, transfers, stairs, etc )    - Identify cognitive and physical deficits and behaviors that affect mobility  - Identify mobility aids required to assist with transfers and/or ambulation (gait belt, sit-to-stand, lift, walker, cane, etc )  - [place] fall precautions as indicated by assessment  - Record patient progress and toleration of activity level on Mobility SBAR; progress patient to next Phase/Stage  - Instruct patient to call for assistance with activity based on assessment  - Request Rehabilitation consult to assist with strengthening/weightbearing, etc   Outcome: Progressing     Problem: DISCHARGE PLANNING  Goal: Discharge to home or other facility with appropriate resources  Description  INTERVENTIONS:  - Identify barriers to discharge w/patient and caregiver  - Arrange for needed discharge resources and transportation as appropriate  - Identify discharge learning needs (meds, wound care, etc )  - Arrange for interpretive services to assist at discharge as needed  - Refer to Case Management Department for coordinating discharge planning if the patient needs post-hospital services based on physician/advanced practitioner order or complex needs related to functional status, cognitive ability, or social support system  Outcome: Progressing

## 2019-08-03 NOTE — PLAN OF CARE
Problem: BIRTH - VAGINAL/ SECTION  Goal: Fetal and maternal status remain reassuring during the birth process  Description  INTERVENTIONS:  - Monitor vital signs  - Monitor fetal heart rate  - Monitor uterine activity  - Monitor labor progression (vaginal delivery)  - DVT prophylaxis  - Antibiotic prophylaxis  Outcome: Progressing  Goal: Emotionally satisfying birthing experience for mother/fetus  Description  Interventions:  - Assess, plan, implement and evaluate the nursing care given to the patient in labor  - Advocate the philosophy that each childbirth experience is a unique experience and support the family's chosen level of involvement and control during the labor process   - Actively participate in both the patient's and family's teaching of the birth process  - Consider cultural, [place] and age-specific factors and plan care for the patient in labor  Outcome: Progressing     Problem: Knowledge Deficit  Goal: Verbalizes understanding of labor plan  Description  Assess patient/family/caregiver's baseline knowledge level and ability to understand information  Provide education via patient/family/caregiver's preferred learning method at appropriate level of understanding  1  Provide teaching at level of understanding  2  Provide teaching via preferred learning method(s)  Outcome: Progressing  Goal: Patient/family/caregiver demonstrates understanding of disease process, treatment plan, medications, and discharge instructions  Description  Complete learning assessment and assess knowledge base  Interventions:  - Provide teaching at level of understanding  - Provide teaching via preferred learning methods  Outcome: Progressing     Problem: Labor & Delivery  Goal: Manages discomfort  Description  Assess and monitor for signs and symptoms of discomfort  Assess patient's pain level regularly and per hospital policy  Administer medications as ordered   Support use of nonpharmacological methods to help control pain such as distraction, imagery, relaxation, and application of heat and cold  Collaborate with interdisciplinary team and patient to determine appropriate pain management plan  1  Include patient in decisions related to comfort  2  Offer non-pharmacological pain management interventions  3  Report ineffective pain management to physician  Outcome: Progressing  Goal: Patient vital signs are stable  Description  1  Assess vital signs - vaginal delivery    Outcome: Progressing     Problem: PAIN - ADULT  Goal: Verbalizes/displays adequate comfort level or baseline comfort level  Description  Interventions:  - Encourage patient to monitor pain and request assistance  - Assess pain using appropriate pain scale  - Administer analgesics based on type and severity of pain and evaluate response  - Implement non-pharmacological measures as appropriate and evaluate response  - Consider cultural and social influences on pain and pain management  - Notify physician/advanced practitioner if interventions unsuccessful or patient reports new pain  Outcome: Progressing     Problem: INFECTION - ADULT  Goal: Absence or prevention of progression during hospitalization  Description  INTERVENTIONS:  - Assess and monitor for signs and symptoms of infection  - Monitor lab/diagnostic results  - Monitor all insertion sites, i e  indwelling lines, tubes, and drains  - Monitor endotracheal (as able) and nasal secretions for changes in amount and color  - [place] appropriate cooling/warming therapies per order  - Administer medications as ordered  - Instruct and encourage patient and family to use good hand hygiene technique  - Identify and instruct in appropriate isolation precautions for identified infection/condition  Outcome: Progressing  Goal: Absence of fever/infection during neutropenic period  Description  INTERVENTIONS:  - Monitor WBC  - Implement neutropenic guidelines  Outcome: Progressing     Problem: SAFETY ADULT  Goal: Patient will remain free of falls  Description  INTERVENTIONS:  - Assess patient frequently for physical needs  -  Identify cognitive and physical deficits and behaviors that affect risk of falls    -  [place] fall precautions as indicated by assessment   - Educate patient/family on patient safety including physical limitations  - Instruct patient to call for assistance with activity based on assessment  - Modify environment to reduce risk of injury  - Consider OT/PT consult to assist with strengthening/mobility  Outcome: Progressing  Goal: Maintain or return to baseline ADL function  Description  INTERVENTIONS:  -  Assess patient's ability to carry out ADLs; assess patient's baseline for ADL function and identify physical deficits which impact ability to perform ADLs (bathing, care of mouth/teeth, toileting, grooming, dressing, etc )  - Assess/evaluate cause of self-care deficits   - Assess range of motion  - Assess patient's mobility; develop plan if impaired  - Assess patient's need for assistive devices and provide as appropriate  - Encourage maximum independence but intervene and supervise when necessary  ¯ Involve family in performance of ADLs  ¯ Assess for home care needs following discharge   ¯ Request OT consult to assist with ADL evaluation and planning for discharge  ¯ Provide patient education as appropriate  Outcome: Progressing  Goal: Maintain or return mobility status to optimal level  Description  INTERVENTIONS:  - Assess patient's baseline mobility status (ambulation, transfers, stairs, etc )    - Identify cognitive and physical deficits and behaviors that affect mobility  - Identify mobility aids required to assist with transfers and/or ambulation (gait belt, sit-to-stand, lift, walker, cane, etc )  - [place] fall precautions as indicated by assessment  - Record patient progress and toleration of activity level on Mobility SBAR; progress patient to next Phase/Stage  - Instruct patient to call for assistance with activity based on assessment  - Request Rehabilitation consult to assist with strengthening/weightbearing, etc   Outcome: Progressing     Problem: DISCHARGE PLANNING  Goal: Discharge to home or other facility with appropriate resources  Description  INTERVENTIONS:  - Identify barriers to discharge w/patient and caregiver  - Arrange for needed discharge resources and transportation as appropriate  - Identify discharge learning needs (meds, wound care, etc )  - Arrange for interpretive services to assist at discharge as needed  - Refer to Case Management Department for coordinating discharge planning if the patient needs post-hospital services based on physician/advanced practitioner order or complex needs related to functional status, cognitive ability, or social support system  Outcome: Progressing

## 2019-08-03 NOTE — H&P
H&P Exam - Obstetrics   Dwayne Mckeon 34 y o  female MRN: [id number]  Unit/Bed#: LD Triage 3- Encounter: [id number]      History of Present Illness     Chief Complaint: Induction of labor    HPI:  Dwayne Mckeon is a 34 y o   female with an NORBERT of 2019, by Last Menstrual Period at 41w4d weeks gestation who is being admitted for induction of labor due to late term  She decline contractions, fluid leakage, vaginal bleeding and reporting good fetal movements  She declined visual changes, right upper quadrant pain and headache  She decline urinary and gastrointestinal problems    Contractions:  No  Loss of fluid:  No  Vaginal bleeding:  No  Fetal movement:  Good    She is SLOGA patient  PREGNANCY COMPLICATIONS:   Patient Active Problem List   Diagnosis    Mild intermittent asthma without complication    Heartburn symptom    Current moderate episode of major depressive disorder without prior episode ([facility] [address] )    High-functioning autism spectrum disorder    High risk pregnancy with high inhibin    Maternal obesity, antepartum, third trimester    Supervision of other normal pregnancy, antepartum    Post-term pregnancy, 40-42 weeks of gestation       OB History    Para Term  AB Living   2   0   1 0   SAB TAB Ectopic Multiple Live Births           0      # Outcome Date GA Lbr Anibal/2nd Weight Sex Delivery Anes PTL Lv   2 Current            1 AB                Baby complications/comments: none    Review of Systems   Constitutional: Negative  HENT: Negative  Respiratory: Negative  Cardiovascular: Negative  Gastrointestinal: Negative  Genitourinary: Negative  Musculoskeletal: Negative  Neurological: Negative  Psychiatric/Behavioral: Negative            Historical Information   Past Medical History:   Diagnosis Date    Asthma     childhood asthma  inhaler prn    Depression     diag with depression/anxiety age 27 -no meds now    Migraine     diag 2 yrs ago- takes extra-strength tylenol    Termination of pregnancy (fetus)     Urinary tract infection     hx of  last episode     Varicella     received vaccines     Past Surgical History:   Procedure Laterality Date    NO PAST SURGERIES      WISDOM TOOTH EXTRACTION  age 23     Social History   Social History     Substance and Sexual Activity   Alcohol Use Yes    Comment: occasional-none since pregnancy     Social History     Substance and Sexual Activity   Drug Use Yes    Types: Marijuana    Comment: Lst used 2018     Social History     Tobacco Use   Smoking Status Former Smoker    Packs/day: 0 50    Years: 10 00    Pack years: 5 00    Types: Cigarettes    Last attempt to quit: 10/1/2018    Years since quittin 8   Smokeless Tobacco Never Used   Tobacco Comment    smokes 1 cigarette per dAY! Family History: non-contributory    Meds/Allergies      Medications Prior to Admission   Medication    aspirin 81 mg chewable tablet    Prenatal Vit-Fe Fumarate-FA (PRENATAL VITAMIN PO)    PROAIR  (90 Base) MCG/ACT inhaler      No Known Allergies    OBJECTIVE:    Vitals: Blood pressure 136/80, pulse 97, temperature 98 2 °F (36 8 °C), temperature source Oral, resp  rate 18, last menstrual period 10/15/2018, not currently breastfeeding  There is no height or weight on file to calculate BMI  Physical Exam   Constitutional: She is oriented to person, place, and time  She appears well-developed and well-nourished  No distress  Cardiovascular: Normal rate and regular rhythm  Pulmonary/Chest: Effort normal    Abdominal: Soft  Musculoskeletal: Normal range of motion  Neurological: She is alert and oriented to person, place, and time  Skin: Skin is warm  She is not diaphoretic  Psychiatric: She has a normal mood and affect   Her behavior is normal          [place]:  Deferred  Nitrazine:  Deferred    Cervix:  Dilation: Closed  Effacement (%): 30  Station: -3  Cervical Characteristics: Anterior    Fetal heart rate:   Baseline Rate: 1430 bpm  FHR Category: Category I    [place]:   Contraction Frequency (minutes): 8-9  Contraction Duration (seconds): 30  Contraction Quality: Mild    EFW: 7 8    GBS:  Negative    Prenatal Labs: I have personally reviewed pertinent reports    , Blood Type:   Lab Results   Component Value Date/Time    ABO Grouping A 2019 02:45 PM   (Rh type):   Lab Results   Component Value Date/Time    Rh Factor Positive 2019 02:45 PM   HCT/HGB:   Lab Results   Component Value Date/Time    Hematocrit 33 1 (L) 2019 08:33 AM    Hemoglobin 10 8 (L) 2019 08:33 AM    MCV:   Lab Results   Component Value Date/Time    MCV 89 2019 08:33 AM   Platelets:   Lab Results   Component Value Date/Time    Platelets 834  08:33 AM   1 hour Glucola:   Lab Results   Component Value Date/Time    Glucose 112 2019 02:03 PM    Rubella:   Lab Results   Component Value Date/Time    Rubella IgG Quant >175 0 2019 02:45 PM   VDRL/RPR:   Lab Results   Component Value Date/Time    RPR Non-Reactive 2019 02:03 PM   Urine Culture/Screen:   Lab Results   Component Value Date/Time    Urine Culture >100,000 cfu/ml Lactobacillus species (A) 2019 02:45 PM    Urine Culture 20,000-29,000 cfu/ml  2019 02:45 PM     Hep B:   Lab Results   Component Value Date/Time    Hepatitis B Surface Ag Non-reactive 2019 02:45 PM    HIV:   Lab Results   Component Value Date/Time    HIV-1/HIV-2 Ab Non-Reactive 2019 02:45 PM   Gonorrhea:   Lab Results   Component Value Date/Time    N gonorrhoeae, DNA Probe Negative 2019 03:44 PM    N gonorrhoeae, DNA Probe N  gonorrhoeae Amplified DNA Negative 2018 04:18 PM      Group B Strep:    Lab Results   Component Value Date/Time    Strep Grp B PCR Negative for Beta Hemolytic Strep Grp B by PCR 2019 04:12 PM              Assessment/Plan     ASSESSMENT:  27yo  at 41w4d weeks gestation who is being admitted for induction of labor due to late term  PLAN:   1) Admit   2) CBC, RPR, Blood Type   3) Start with misoprostol for cervical ripening   4) GBS negative status:  No PCN for prophylaxis    5) Analgesia and/or epidural at patient request   6) Anticipate    7) Discussed with Dr Macias Client      This patient will be an INPATIENT  and I certify the anticipated length of stay is >2 Midnights      Shanique Savage MD  [phone]  0:63 PM

## 2019-08-03 NOTE — OB LABOR/OXYTOCIN SAFETY PROGRESS
Oxytocin Safety Progress Check Note - Gomez Francois 34 y o  female MRN: [id number]    Unit/Bed#: -01 Encounter: [id number]    OB History    Para Term  AB Living   2 0 0 0 1 0   SAB TAB Ectopic Multiple Live Births   0 0 0 0 0     Gestational Age: 41w5d  Dose (grace-units/min) Oxytocin: 18 grace-units/min  Contraction Frequency (minutes): 2-3  Contraction Quality: Mild, Moderate  Tachysystole: No   Dilation: 7        Effacement (%): 90  Station: -1  Baseline Rate: 130 bpm  Fetal Heart Rate: 134 BPM  FHR Category: Category I     Oxytocin Safety Progress Check: Safety check completed    Notes/comments:     Patient becoming more uncomfortable, requesting epidural   Pit at 18, Cat I tracing  Continue pitocin titration            Iris Robles MD 8/3/2019 4:59 PM

## 2019-08-03 NOTE — OB LABOR/OXYTOCIN SAFETY PROGRESS
Oxytocin Safety Progress Check Note - Cole Andino 34 y o  female MRN: [id number]    Unit/Bed#: -01 Encounter: [id number]    OB History    Para Term  AB Living   2 0 0 0 1 0   SAB TAB Ectopic Multiple Live Births   0 0 0 0 0     Gestational Age: 41w5d  Dose (grace-units/min) Oxytocin: 20 grace-units/min(Dr Bessie Moss at bedside)  Contraction Frequency (minutes): diff to trace, pt repositioned  Contraction Quality: Mild  Tachysystole: No   Dilation: 7        Effacement (%): 90  Station: -1  Baseline Rate: 120 bpm  Fetal Heart Rate: 125 BPM  FHR Category: Category I     Oxytocin Safety Progress Check: Safety check completed    Notes/comments:      Unable to  contractions on external toco  Patient not feeling contractions  IUPC placed  Will continue to monitor       Kishan Lee MD 8/3/2019 7:45 PM

## 2019-08-03 NOTE — OB LABOR/OXYTOCIN SAFETY PROGRESS
Labor Progress Note - Eunice Hand 34 y o  female MRN: [id number]    Unit/Bed#: LD Triage 3-01 Encounter: [id number]    OB History    Para Term  AB Living   2 0 0 0 1 0   SAB TAB Ectopic Multiple Live Births   0 0 0 0 0     Gestational Age: 41w4d     Contraction Frequency (minutes): 8-9  Contraction Quality: Mild  Tachysystole: No   Dilation: Closed        Effacement (%): 30  Station: -3  Baseline Rate: 1430 bpm     FHR Category: Category I     Oxytocin Safety Progress Check: Safety check completed    Notes/comments:   Exam shown as above for cervical ripening misoprostol 25 micro g vaginal wall inserted            Michaela Kelly MD [id number] 1:32 PM

## 2019-08-04 LAB
BASE EXCESS BLDCOA CALC-SCNC: -2.6 MMOL/L (ref 3–11)
BASE EXCESS BLDCOV CALC-SCNC: -4.2 MMOL/L (ref 1–9)
HCO3 BLDCOA-SCNC: 21.8 MMOL/L (ref 17.3–27.3)
HCO3 BLDCOV-SCNC: 20.2 MMOL/L (ref 12.2–28.6)
O2 CT VFR BLDCOA CALC: 14.8 ML/DL
OXYHGB MFR BLDCOA: 62.7 %
OXYHGB MFR BLDCOV: 44.9 %
PCO2 BLDCOA: 37.3 MM[HG] (ref 30–60)
PCO2 BLDCOV: 35.5 MM HG (ref 27–43)
PH BLDCOA: 7.38 [PH] (ref 7.23–7.43)
PH BLDCOV: 7.37 [PH] (ref 7.19–7.49)
PO2 BLDCOA: 26.1 MM HG (ref 5–25)
PO2 BLDCOV: 18.1 MM HG (ref 15–45)
SAO2 % BLDCOV: 10.5 ML/DL

## 2019-08-04 PROCEDURE — 10H07YZ INSERTION OF OTHER DEVICE INTO PRODUCTS OF CONCEPTION, VIA NATURAL OR ARTIFICIAL OPENING: ICD-10-PCS | Performed by: OBSTETRICS & GYNECOLOGY

## 2019-08-04 PROCEDURE — 10907ZC DRAINAGE OF AMNIOTIC FLUID, THERAPEUTIC FROM PRODUCTS OF CONCEPTION, VIA NATURAL OR ARTIFICIAL OPENING: ICD-10-PCS | Performed by: OBSTETRICS & GYNECOLOGY

## 2019-08-04 PROCEDURE — 82805 BLOOD GASES W/O2 SATURATION: CPT | Performed by: OBSTETRICS & GYNECOLOGY

## 2019-08-04 RX ORDER — ALBUTEROL SULFATE 90 UG/1
2 AEROSOL, METERED RESPIRATORY (INHALATION) EVERY 4 HOURS PRN
Status: DISCONTINUED | OUTPATIENT
Start: 2019-08-04 | End: 2019-08-06 | Stop reason: HOSPADM

## 2019-08-04 RX ORDER — LIDOCAINE HYDROCHLORIDE AND EPINEPHRINE 15; 5 MG/ML; UG/ML
INJECTION, SOLUTION EPIDURAL
Status: COMPLETED | OUTPATIENT
Start: 2019-08-04 | End: 2019-08-04

## 2019-08-04 RX ORDER — CLONIDINE 100 UG/ML
INJECTION, SOLUTION EPIDURAL AS NEEDED
Status: DISCONTINUED | OUTPATIENT
Start: 2019-08-04 | End: 2019-08-04 | Stop reason: SURG

## 2019-08-04 RX ORDER — CALCIUM CARBONATE 200(500)MG
1000 TABLET,CHEWABLE ORAL DAILY PRN
Status: DISCONTINUED | OUTPATIENT
Start: 2019-08-04 | End: 2019-08-06 | Stop reason: HOSPADM

## 2019-08-04 RX ORDER — SIMETHICONE 80 MG
80 TABLET,CHEWABLE ORAL 4 TIMES DAILY PRN
Status: DISCONTINUED | OUTPATIENT
Start: 2019-08-04 | End: 2019-08-06 | Stop reason: HOSPADM

## 2019-08-04 RX ORDER — BUTORPHANOL TARTRATE 1 MG/ML
1 INJECTION, SOLUTION INTRAMUSCULAR; INTRAVENOUS ONCE
Status: COMPLETED | OUTPATIENT
Start: 2019-08-04 | End: 2019-08-04

## 2019-08-04 RX ORDER — ACETAMINOPHEN 325 MG/1
650 TABLET ORAL EVERY 4 HOURS PRN
Status: DISCONTINUED | OUTPATIENT
Start: 2019-08-04 | End: 2019-08-06 | Stop reason: HOSPADM

## 2019-08-04 RX ORDER — METHYLERGONOVINE MALEATE 0.2 MG/ML
INJECTION INTRAVENOUS
Status: DISCONTINUED
Start: 2019-08-04 | End: 2019-08-04 | Stop reason: WASHOUT

## 2019-08-04 RX ORDER — DIAPER,BRIEF,INFANT-TODD,DISP
1 EACH MISCELLANEOUS AS NEEDED
Status: DISCONTINUED | OUTPATIENT
Start: 2019-08-04 | End: 2019-08-06 | Stop reason: HOSPADM

## 2019-08-04 RX ORDER — BUPIVACAINE HYDROCHLORIDE 2.5 MG/ML
INJECTION, SOLUTION INFILTRATION; PERINEURAL AS NEEDED
Status: DISCONTINUED | OUTPATIENT
Start: 2019-08-04 | End: 2019-08-04 | Stop reason: SURG

## 2019-08-04 RX ORDER — IBUPROFEN 600 MG/1
600 TABLET ORAL EVERY 6 HOURS PRN
Status: DISCONTINUED | OUTPATIENT
Start: 2019-08-04 | End: 2019-08-06 | Stop reason: HOSPADM

## 2019-08-04 RX ORDER — OXYTOCIN/RINGER'S LACTATE 30/500 ML
250 PLASTIC BAG, INJECTION (ML) INTRAVENOUS
Status: ACTIVE | OUTPATIENT
Start: 2019-08-04 | End: 2019-08-04

## 2019-08-04 RX ORDER — DOCUSATE SODIUM 100 MG/1
100 CAPSULE, LIQUID FILLED ORAL 2 TIMES DAILY
Status: DISCONTINUED | OUTPATIENT
Start: 2019-08-04 | End: 2019-08-06 | Stop reason: HOSPADM

## 2019-08-04 RX ADMIN — ROPIVACAINE HYDROCHLORIDE: 2 INJECTION, SOLUTION EPIDURAL; INFILTRATION at 01:01

## 2019-08-04 RX ADMIN — WITCH HAZEL 1 PAD: 500 SOLUTION RECTAL; TOPICAL at 20:50

## 2019-08-04 RX ADMIN — IBUPROFEN 600 MG: 600 TABLET ORAL at 20:49

## 2019-08-04 RX ADMIN — ONDANSETRON 4 MG: 2 INJECTION INTRAMUSCULAR; INTRAVENOUS at 12:59

## 2019-08-04 RX ADMIN — BUTORPHANOL TARTRATE 1 MG: 1 INJECTION, SOLUTION INTRAMUSCULAR; INTRAVENOUS at 09:48

## 2019-08-04 RX ADMIN — SODIUM CHLORIDE, SODIUM LACTATE, POTASSIUM CHLORIDE, AND CALCIUM CHLORIDE 125 ML/HR: .6; .31; .03; .02 INJECTION, SOLUTION INTRAVENOUS at 12:26

## 2019-08-04 RX ADMIN — CLONIDINE 100 MCG: 100 INJECTION, SOLUTION EPIDURAL at 09:05

## 2019-08-04 RX ADMIN — HYDROCORTISONE 1 APPLICATION: 1 CREAM TOPICAL at 20:55

## 2019-08-04 RX ADMIN — ROPIVACAINE HYDROCHLORIDE: 2 INJECTION, SOLUTION EPIDURAL; INFILTRATION at 15:49

## 2019-08-04 RX ADMIN — LIDOCAINE HYDROCHLORIDE AND EPINEPHRINE 5 ML: 15; 5 INJECTION, SOLUTION EPIDURAL at 11:24

## 2019-08-04 RX ADMIN — ROPIVACAINE HYDROCHLORIDE: 2 INJECTION, SOLUTION EPIDURAL; INFILTRATION at 08:06

## 2019-08-04 RX ADMIN — BUPIVACAINE HYDROCHLORIDE 5 ML: 2.5 INJECTION, SOLUTION INFILTRATION; PERINEURAL at 09:05

## 2019-08-04 RX ADMIN — ONDANSETRON 4 MG: 2 INJECTION INTRAMUSCULAR; INTRAVENOUS at 04:07

## 2019-08-04 RX ADMIN — BENZOCAINE AND LEVOMENTHOL: 200; 5 SPRAY TOPICAL at 20:55

## 2019-08-04 RX ADMIN — SODIUM CHLORIDE, SODIUM LACTATE, POTASSIUM CHLORIDE, AND CALCIUM CHLORIDE 125 ML/HR: .6; .31; .03; .02 INJECTION, SOLUTION INTRAVENOUS at 02:39

## 2019-08-04 RX ADMIN — Medication 32 MILLI-UNITS/MIN: at 17:26

## 2019-08-04 RX ADMIN — SODIUM CHLORIDE, SODIUM LACTATE, POTASSIUM CHLORIDE, AND CALCIUM CHLORIDE 125 ML/HR: .6; .31; .03; .02 INJECTION, SOLUTION INTRAVENOUS at 17:11

## 2019-08-04 NOTE — OB LABOR/OXYTOCIN SAFETY PROGRESS
Oxytocin Safety Progress Check Note - [facility] 34 [facility] o  female MRN: [id number]    Unit/Bed#: -01 Encounter: [id number]    OB History    Para Term  AB Living   2 0 0 0 1 0   SAB TAB Ectopic Multiple Live Births   0 0 0 0 0     Gestational Age: 41w5d  Dose (grace-units/min) Oxytocin: 20 grace-units/min(Dr Bakari Guerra at bedside)  Contraction Frequency (minutes): diff to trace, pt repositioned  Contraction Quality: Mild  Tachysystole: No   Dilation: 1        Effacement (%): 50  Station: -3  Baseline Rate: 120 bpm  Fetal Heart Rate: 125 BPM  FHR Category: Category I     Oxytocin Safety Progress Check: Safety check completed    Notes/comments:   Patient assessed at bedside, appears to be 1 cm dilated  Will discussed with Dr Joanne Gonzalez regarding potential leyva balloon +/- cytotec vs +/- pitocin            Judy Wallace MD 8/3/2019 8:12 PM

## 2019-08-04 NOTE — OB LABOR/OXYTOCIN SAFETY PROGRESS
Oxytocin Safety Progress Check Note - Joanne Wing 34 y o  female MRN: [id number]    Unit/Bed#: -01 Encounter: [id number]    OB History    Para Term  AB Living   2 0 0 0 1 0   SAB TAB Ectopic Multiple Live Births   0 0 0 0 0     Gestational Age: 41w6d  Dose (grace-units/min) Oxytocin: 12 grace-units/min  Contraction Frequency (minutes): 2-6  Contraction Quality: Mild  Tachysystole: No   Dilation: 5        Effacement (%): 60  Station: -2  Baseline Rate: 120 bpm  Fetal Heart Rate: 130 BPM  FHR Category: Category I     Oxytocin Safety Progress Check: Safety check completed    Notes/comments:     Patient artificially ruptured for clear fluid  Comfortable with epidural            Corinna Gore MD 2019 5:04 AM

## 2019-08-04 NOTE — OB LABOR/OXYTOCIN SAFETY PROGRESS
Oxytocin Safety Progress Check Note - Patrick Wright 34 y o  female MRN: [id number]    Unit/Bed#: -01 Encounter: [id number]    OB History    Para Term  AB Living   2 0 0 0 1 0   SAB TAB Ectopic Multiple Live Births   0 0 0 0 0     Gestational Age: 41w6d  Dose (grace-units/min) Oxytocin: 30 grace-units/min  Contraction Frequency (minutes): 3  Contraction Quality: Moderate  Tachysystole: No   Dilation: 8-9        Effacement (%): 90  Station: 0  Baseline Rate: 120 bpm  Fetal Heart Rate: 135 BPM  FHR Category: Category I     Oxytocin Safety Progress Check: Safety check completed    Notes/comments:   Patient is still feeling pain after 2nd epidural   Patient has progressed since last check exam shown as above            Pb Mir MD 8213 4:16 PM

## 2019-08-04 NOTE — ANESTHESIA PROCEDURE NOTES
Epidural Block    Patient location during procedure: OB  Start time: 8/4/2019 11:23 AM  Reason for block: procedure for pain, at surgeon's request and primary anesthetic  Staffing  Anesthesiologist: Elba Meredith MD  Performed: anesthesiologist   Preanesthetic Checklist  Completed: patient identified, site marked, surgical consent, pre-op evaluation, timeout performed, IV checked, risks and benefits discussed and monitors and equipment checked  Epidural  Patient position: sitting  Prep: ChloraPrep and site prepped and draped  Patient monitoring: continuous pulse ox, heart rate and frequent blood pressure checks  Approach: midline  Location: lumbar (1-5)  Injection technique: YASH saline  Needle  Needle type: Tuohy   Epidural needle gauge: 17   Catheter type: side hole  Catheter size: 19   Catheter at skin depth: 13 cm  Test dose: negativelidocaine 1 5% with epinephrine 1:200,000 test dose, 5 mLnegative aspiration for CSF, negative aspiration for heme and no paresthesia on injection  patient tolerated the procedure well with no immediate complications

## 2019-08-04 NOTE — OB LABOR/OXYTOCIN SAFETY PROGRESS
Oxytocin Safety Progress Check Note - Katy Roberts 34 y o  female MRN: [id number]    Unit/Bed#: -01 Encounter: [id number]    OB History    Para Term  AB Living   2 0 0 0 1 0   SAB TAB Ectopic Multiple Live Births   0 0 0 0 0     Gestational Age: 45w8d  Dose (grace-units/min) Oxytocin: 4 grace-units/min(per Dr Ap Mills, leyva ball out)  Contraction Frequency (minutes): 0  Contraction Quality: Mild  Tachysystole: No   Dilation: 4        Effacement (%): 50  Station: -2  Baseline Rate: 145 bpm  Fetal Heart Rate: 150 BPM  FHR Category: Category I     Oxytocin Safety Progress Check: Safety check completed    Notes/comments:   Leyva balloon out, will continue pitocin titration  Consider AROM at next check            Morris Lombardi MD 2019 2:34 AM

## 2019-08-04 NOTE — OB LABOR/OXYTOCIN SAFETY PROGRESS
Oxytocin Safety Progress Check Note - Christina Sanchez 34 y o  female MRN: [id number]    Unit/Bed#: -01 Encounter: [id number]    OB History    Para Term  AB Living   2 0 0 0 1 0   SAB TAB Ectopic Multiple Live Births   0 0 0 0 0     Gestational Age: 41w6d  Dose (grace-units/min) Oxytocin: 20 grace-units/min  Contraction Frequency (minutes): 3-4  Contraction Quality: Moderate  Tachysystole: No   Dilation: 5        Effacement (%): 70  Station: -1  Baseline Rate: 130 bpm  Fetal Heart Rate: 125 BPM  FHR Category: Category I     Oxytocin Safety Progress Check: Safety check completed    Notes/comments:   Pt is feeling pain with each contractions, Anesthesia will be calling, may be need bolus    [facility] 130/cat1 toco 3-4           Kia Santos MD 1766 4:06 AM

## 2019-08-04 NOTE — OB LABOR/OXYTOCIN SAFETY PROGRESS
Oxytocin Safety Progress Check Note - Letitia Gama 34 y o  female MRN: [id number]    Unit/Bed#: -01 Encounter: [id number]    OB History    Para Term  AB Living   2 0 0 0 1 0   SAB TAB Ectopic Multiple Live Births   0 0 0 0 0     Gestational Age: 41w6d  Dose (grace-units/min) Oxytocin: 16 grace-units/min  Contraction Frequency (minutes): 2-5  Contraction Quality: Moderate  Tachysystole: No   Dilation: 5-6        Effacement (%): 80  Station: -1  Baseline Rate: 125 bpm  Fetal Heart Rate: 130 BPM  FHR Category: Category I     Oxytocin Safety Progress Check: Safety check completed    Notes/comments:             Justin Mccabe MD 2019 7:30 AM

## 2019-08-04 NOTE — OB LABOR/OXYTOCIN SAFETY PROGRESS
Oxytocin Safety Progress Check Note - Tiny Square 34 y o  female MRN: [id number]    Unit/Bed#: -01 Encounter: [id number]    OB History    Para Term  AB Living   2 0 0 0 1 0   SAB TAB Ectopic Multiple Live Births   0 0 0 0 0     Gestational Age: 41w6d  Dose (grace-units/min) Oxytocin: 30 grace-units/min  Contraction Frequency (minutes): 3-4  Contraction Quality: Moderate  Tachysystole: No   Dilation: 5-6        Effacement (%): 90  Station: -1  Baseline Rate: 125 bpm  Fetal Heart Rate: 135 BPM  FHR Category: Category I     Oxytocin Safety Progress Check: Safety check completed    Notes/comments:   Patient more comfortable after epidural redone; feeling only pressure  Will recheck in 2 hours  If no change will need to discuss  section            Suzi Alfaro MD 2019 12:59 PM

## 2019-08-04 NOTE — OB LABOR/OXYTOCIN SAFETY PROGRESS
Oxytocin Safety Progress Check Note - Gricel Hall 34 y o  female MRN: [id number]    Unit/Bed#: -01 Encounter: [id number]    OB History    Para Term  AB Living   2 0 0 0 1 0   SAB TAB Ectopic Multiple Live Births   0 0 0 0 0     Gestational Age: 41w6d  Dose (grace-units/min) Oxytocin: 20 grace-units/min  Contraction Frequency (minutes): 3-4  Contraction Quality: Moderate  Tachysystole: No   Dilation: 5        Effacement (%): 70  Station: -3  Baseline Rate: 130 bpm  Fetal Heart Rate: 125 BPM  FHR Category: Category I     Oxytocin Safety Progress Check: Safety check completed    Notes/comments:   Patient is feeling pain she recently she received a bolus anesthesia will be notified again for pain exam did not change since last check            Evelina Rendon MD 8596 6:21 AM

## 2019-08-04 NOTE — OB LABOR/OXYTOCIN SAFETY PROGRESS
Oxytocin Safety Progress Check Note - Alessandro Cisneros 34 y o  female MRN: [id number]    Unit/Bed#: -01 Encounter: [id number]    OB History    Para Term  AB Living   2 0 0 0 1 0   SAB TAB Ectopic Multiple Live Births   0 0 0 0 0     Gestational Age: 41w6d  Dose (grace-units/min) Oxytocin: 14 grace-units/min  Contraction Frequency (minutes): 2-5  Contraction Quality: Moderate  Tachysystole: No   Dilation: 5        Effacement (%): 60  Station: -2  Baseline Rate: 125 bpm  Fetal Heart Rate: 130 BPM  FHR Category: Category I     Oxytocin Safety Progress Check: Safety check completed    Notes/comments: This is a retrospective note from 26 today  AROM for clear fluid and IUPC placed            Sveta Valencia MD 2019 7:18 AM

## 2019-08-05 LAB — RPR SER QL: NORMAL

## 2019-08-05 PROCEDURE — 99024 POSTOP FOLLOW-UP VISIT: CPT | Performed by: OBSTETRICS & GYNECOLOGY

## 2019-08-05 PROCEDURE — 59400 OBSTETRICAL CARE: CPT | Performed by: OBSTETRICS & GYNECOLOGY

## 2019-08-05 PROCEDURE — 0KQM0ZZ REPAIR PERINEUM MUSCLE, OPEN APPROACH: ICD-10-PCS | Performed by: OBSTETRICS & GYNECOLOGY

## 2019-08-05 RX ADMIN — IBUPROFEN 600 MG: 600 TABLET ORAL at 14:46

## 2019-08-05 RX ADMIN — DOCUSATE SODIUM 100 MG: 100 CAPSULE, LIQUID FILLED ORAL at 19:26

## 2019-08-05 RX ADMIN — DOCUSATE SODIUM 100 MG: 100 CAPSULE, LIQUID FILLED ORAL at 08:04

## 2019-08-05 RX ADMIN — IBUPROFEN 600 MG: 600 TABLET ORAL at 23:46

## 2019-08-05 RX ADMIN — PRENATAL VIT W/ FE FUMARATE-FA TAB 27-0.8 MG 1 TABLET: 27-0.8 TAB at 08:05

## 2019-08-05 NOTE — DISCHARGE SUMMARY
Discharge Summary - OB/GYN   Shahriar Hussein 34 y o  female MRN: [id number]  Unit/Bed#: -01 Encounter: [id number]      Admission Date: 2019     Discharge Date: 2019    Admitting Diagnosis:   1  Pregnancy at 41w6d    Discharge Diagnosis:   Same, delivered    Procedures: spontaneous vaginal delivery    Delivery Attending: Prieto Byrne MD     Discharge Attending: Dr Dia Bhatia Course:     Shahriar Hussein is a 34 y o   at 41w6d wks who was initially admitted for elective induction of labor  Her labor course was notable for cervical ripening with cytotec, induction with pitocin, and AROM with clear fluid  She delivered a viable female  on 2019 at 67  Weight 7lbs 14oz via spontaneous vaginal delivery  She underwent repair of a 2nd degree laceration which showed good hemostasis  Apgars were 8 (1 min) and 9 (5 min)   was transferred to  nursery  Patient tolerated the procedure well and was transferred to recovery in stable condition  Her post-partum course unremarkable  Her post-partum pain was well controlled with oral analgesics  On day of discharge, she was ambulating and able to reasonably perform all ADLs  She was voiding and had appropriate bowel function  Pain was well controlled  She was discharged home on post-partum day #2 without complications  Patient was instructed to follow up with her OB as an outpatient and was given appropriate warnings to call provider if she develops signs of infection or uncontrolled pain  Complications: none apparent    Condition at discharge: stable     Provisions for Follow-Up Care:  See after visit summary for information related to follow-up care and any pertinent home health orders  Disposition: Home    Planned Readmission: No    Discharge Medications:   Prenatal vitamin daily for 6 months or the duration of nursing whichever is longer    Motrin 600 mg orally every 6 hours as needed for pain  Tylenol (over the counter) per bottle directions as needed for pain  Hydrocortisone cream 1% (over the counter) applied 1-2x daily to hemorrhoids as needed  Witch hazel pads for hemorrhoidal discomfort as needed    Discharge instructions :   See after visit summary for information provided to patient and family    -Do not place anything (no partner, tampons or douche) in your vagina for 6 weeks  -You may walk for exercise for the first 6 weeks then gradually return to your usual activities    -Please do not drive for 1 week if you have no stitches and for 2 weeks if you have stitches or underwent a  delivery     -You may take baths or shower per your preference    -Please look at your bust (breasts) in the mirror daily and call for redness or tenderness or increased warmth  - If you have had a  please look at your incision daily as well and call us for increasing redness or steady drainage from the incision    -Please call us for temperature > 100 4*F or 38* C, worsening pain or a foul discharge

## 2019-08-05 NOTE — L&D DELIVERY NOTE
DELIVERY NOTE  Jorge Alberto Duenas 34 y o  female MRN: [id number]  Unit/Bed#: -01 Encounter: [id number]    Obstetrician:    Dr Dulce Rich MD    Assistant:   Dr Vijaya Nj    Pre-Delivery Diagnosis:   Patient Active Problem List   Diagnosis    Mild intermittent asthma without complication    Heartburn symptom    Current moderate episode of major depressive disorder without prior episode ([address] )    High-functioning autism spectrum disorder    High risk pregnancy with high inhibin    Maternal obesity, antepartum, third trimester    Supervision of other normal pregnancy, antepartum    Post-term pregnancy, 40-42 weeks of gestation           Post-Delivery Diagnosis:     2nd degree laceration    Procedure:  Spontaneous vaginal delivery  Repair 2nd degree spontaneous laceration      Anesthesia:  epidural    Specimens:   Cord blood obtained   Placenta; normal appearing, central insertion, intact   Arterial and venous blood gases (below)     Gases:  Umbilical Cord Venous Blood Gas:  Results from last 7 days   Lab Units 19  1839   PH COV  7 372   PCO2 COV mm HG 35 5   HCO3 COV mmol/L 20 2   BASE EXC COV mmol/L -4 2*   O2 CT CD VB mL/dL 10 5   O2 HGB, VENOUS CORD % 92 9     Umbilical Cord Arterial Blood Gas:  Results from last 7 days   Lab Units 19  1839   PH COA  7 385   PCO2 COA  37 3   PO2 COA mm HG 26 1*   HCO3 COA mmol/L 21 8   BASE EXC COA mmol/L -2 6*   O2 CONTENT CORD ART ml/dl 14 8   O2 HGB, ARTERIAL CORD % 62 7       Quantitative Blood Loss:   pending           Complications:    None    Brief Description of Labor Course:  Jorge Alberto Duenas is a 34 y o   female at [place] [address]  who was admitted to L&D for elective induction of labor on 2019  Her labor course was notable for cervical ripening with cytotec, induction with pitocin, and AROM with clear fluid  She progressed to complete at 1640 on 2019, pushed for 1hr and 43 min, and delivered a healthy  at 200  Description of Delivery:   With  the assistance of maternal expulsive forces, the fetal vertex delivered spontaneously  A nuchal cord was noted x1 and reduced over the fetal head  The anterior right shoulder was delivered atraumatically with gentle downward traction  The contralateral arm was delivered with gentle upward traction  The remainder of the fetus delivered spontaneously at 200, resulting in a viable female   Upon delivery, the infant was placed on the mothers abdomen and the cord was clamped and cut after 30 seconds  The  was noted to have good tone and cry spontaneously  There was no evidence of injury  The  was passed off to  staff for evaluation  Umbilical cord blood and umbilical artery and venous gases were collected and sent to the lab  An intact placenta was delivered spontaneously at 1837 using fundal massage and gentle cord traction and was noted to have a centrally-inserted 3-vessel cord  Active management of the third stage of labor was undertaken with 30 units of IV pitocin delivered at 250 milliunits/min  Inspection of the perineum, vagina, labia, cervix, and urethra revealed a 2nd degree laceration  Bleeding was noted to be under control  A bimanual exam was performed   Outcome:  Living  with APGARS 8 (1 min) and 9 (5 min)   weight: 7lb 14oz    Perineal Inspection/Laceration Repair  Inspection of the perineum, vagina, labia, cervix, and urethra revealed a 2nd degree laceration, which was repaired in standard fashion with 3-0 Vicryl rapide  Patient was comfortable with epidural at that time  The laceration showed good tissue reapproximation and hemostasis  At the conclusion of the delivery, all needle, sponge, and instrument counts were noted to be correct  Patient tolerated the procedure well and was allowed to recover in labor and delivery room with family and  before being transferred to the post-partum floor  Conclusion:  Mother and baby are currently recovering nicely in stable condition  Attending Supervision:   Dr Vipul Joiner MD was present for the entire procedure      Oj King MD  PGY-1 OB/GYN   8/5/2019 3:38 AM

## 2019-08-05 NOTE — PROGRESS NOTES
Progress Note - OB/GYN   Alessandro Cisneros 34 y o  female MRN: [id number]  Unit/Bed#: -01 Encounter: [id number]    Assessment:  Post partum day #1 s/p , stable    Plan:  1  Continue routine post partum care   -Encourage ambulation   -Encourage breastfeeding   -Anticipate discharge tomorrow    Subjective/Objective   Chief Complaint:   Post delivery    Subjective:     Pain: yes, cramping, improved with meds  Tolerating PO: yes  Voiding: yes  Flatus: yes  BM: no  Ambulating: yes  Breastfeeding:  Yes  Chest pain: no  Shortness of breath: no  Leg pain: no  Lochia: minimal    Objective:     Vitals: /65   Pulse 80   Temp 98 °F (36 7 °C) (Oral)   Resp 18   Ht 5' 9" (1 753 m)   Wt 109 kg (241 lb)   LMP 10/15/2018   SpO2 98%   Breastfeeding?  Yes   BMI 35 59 kg/m²       Intake/Output Summary (Last 24 hours) at 2019 0616  Last data filed at 2019 0420  Gross per 24 hour   Intake 2574 52 ml   Output 2450 ml   Net 124 52 ml       Lab Results   Component Value Date    WBC 9 84 2019    HGB 11 2 (L) 2019    HCT 32 8 (L) 2019    MCV 86 2019     2019       Current Facility-Administered Medications   Medication Dose Route Frequency    acetaminophen (TYLENOL) tablet 650 mg  650 mg Oral Q4H PRN    albuterol (PROVENTIL HFA,VENTOLIN HFA) inhaler 2 puff  2 puff Inhalation Q4H PRN    calcium carbonate (TUMS) chewable tablet 1,000 mg  1,000 mg Oral Daily PRN    docusate sodium (COLACE) capsule 100 mg  100 mg Oral BID    hydrocortisone 1 % cream 1 application  1 application Topical PRN    ibuprofen (MOTRIN) tablet 600 mg  600 mg Oral Q6H PRN    prenatal multivitamin tablet 1 tablet  1 tablet Oral Daily    simethicone (MYLICON) chewable tablet 80 mg  80 mg Oral 4x Daily PRN    witch hazel-glycerin (TUCKS) topical pad 1 pad  1 pad Topical PRN       Physical Exam:     Gen: AAOx3, NAD  CV: RRR  Lungs: CTA b/l  Abd: Soft, non-tender, non-distended, no rebound or guarding  Uterine fundus firm and non-tender, 1cm below umbilicus  Ext: Non tender    Jonny Yo DO  PGY1 - Emergency Medicine  OB/GYN  8/5/2019  6:16 AM

## 2019-08-05 NOTE — SOCIAL WORK
CM met with Pt, father of baby(FOB) Alda Maza [phone] with and introduction and explanation of role  Pt reported residing with Inez Channel and this baby who they have named Ayaz Roldan is their first child who will be breast fed  Pt reported she has everything needed for Ayaz Clam at this, including a breast pump  Pt reported she will be taking Ayaz Clam to [employer], under [employer] carrier of [employer]  Pt reported having plenty of family support from both her side and the [employer] side of the family  Pt reported both her and Inez Channel are employed  Pt reported she received her prenatal care at [facility], has a hx of major depression but is no longer under the care of a psychiatrist or on any medication at this time  Pt denied any hx of mental health or drug/alcohol placements, and hasn't been experiencing any symptoms of depression  Pt denied any legal involvement

## 2019-08-05 NOTE — PLAN OF CARE
Problem: BIRTH - VAGINAL/ SECTION  Goal: Fetal and maternal status remain reassuring during the birth process  Description  INTERVENTIONS:  - Monitor vital signs  - Monitor fetal heart rate  - Monitor uterine activity  - Monitor labor progression (vaginal delivery)  - DVT prophylaxis  - Antibiotic prophylaxis  Outcome: Progressing  Goal: Emotionally satisfying birthing experience for mother/fetus  Description  Interventions:  - Assess, plan, implement and evaluate the nursing care given to the patient in labor  - Advocate the philosophy that each childbirth experience is a unique experience and support the family's chosen level of involvement and control during the labor process   - Actively participate in both the patient's and family's teaching of the birth process  - Consider cultural, Zoroastrianism and age-specific factors and plan care for the patient in labor  Outcome: Progressing     Problem: Knowledge Deficit  Goal: Verbalizes understanding of labor plan  Description  Assess patient/family/caregiver's baseline knowledge level and ability to understand information  Provide education via patient/family/caregiver's preferred learning method at appropriate level of understanding  1  Provide teaching at level of understanding  2  Provide teaching via preferred learning method(s)  Outcome: Progressing  Goal: Patient/family/caregiver demonstrates understanding of disease process, treatment plan, medications, and discharge instructions  Description  Complete learning assessment and assess knowledge base    Interventions:  - Provide teaching at level of understanding  - Provide teaching via preferred learning methods  Outcome: Progressing     Problem: PAIN - ADULT  Goal: Verbalizes/displays adequate comfort level or baseline comfort level  Description  Interventions:  - Encourage patient to monitor pain and request assistance  - Assess pain using appropriate pain scale  - Administer analgesics based on type and severity of pain and evaluate response  - Implement non-pharmacological measures as appropriate and evaluate response  - Consider cultural and social influences on pain and pain management  - Notify physician/advanced practitioner if interventions unsuccessful or patient reports new pain  Outcome: Progressing     Problem: INFECTION - ADULT  Goal: Absence or prevention of progression during hospitalization  Description  INTERVENTIONS:  - Assess and monitor for signs and symptoms of infection  - Monitor lab/diagnostic results  - Monitor all insertion sites, i e  indwelling lines, tubes, and drains  - Monitor endotracheal (as able) and nasal secretions for changes in amount and color  - [place] appropriate cooling/warming therapies per order  - Administer medications as ordered  - Instruct and encourage patient and family to use good hand hygiene technique  - Identify and instruct in appropriate isolation precautions for identified infection/condition  Outcome: Progressing  Goal: Absence of fever/infection during neutropenic period  Description  INTERVENTIONS:  - Monitor WBC  - Implement neutropenic guidelines  Outcome: Progressing     Problem: SAFETY ADULT  Goal: Patient will remain free of falls  Description  INTERVENTIONS:  - Assess patient frequently for physical needs  -  Identify cognitive and physical deficits and behaviors that affect risk of falls    -  [place] fall precautions as indicated by assessment   - Educate patient/family on patient safety including physical limitations  - Instruct patient to call for assistance with activity based on assessment  - Modify environment to reduce risk of injury  - Consider OT/PT consult to assist with strengthening/mobility  Outcome: Progressing  Goal: Maintain or return to baseline ADL function  Description  INTERVENTIONS:  -  Assess patient's ability to carry out ADLs; assess patient's baseline for ADL function and identify physical deficits which impact ability to perform ADLs (bathing, care of mouth/teeth, toileting, grooming, dressing, etc )  - Assess/evaluate cause of self-care deficits   - Assess range of motion  - Assess patient's mobility; develop plan if impaired  - Assess patient's need for assistive devices and provide as appropriate  - Encourage maximum independence but intervene and supervise when necessary  ¯ Involve family in performance of ADLs  ¯ Assess for home care needs following discharge   ¯ Request OT consult to assist with ADL evaluation and planning for discharge  ¯ Provide patient education as appropriate  Outcome: Progressing  Goal: Maintain or return mobility status to optimal level  Description  INTERVENTIONS:  - Assess patient's baseline mobility status (ambulation, transfers, stairs, etc )    - Identify cognitive and physical deficits and behaviors that affect mobility  - Identify mobility aids required to assist with transfers and/or ambulation (gait belt, sit-to-stand, lift, walker, cane, etc )  - [place] fall precautions as indicated by assessment  - Record patient progress and toleration of activity level on Mobility SBAR; progress patient to next Phase/Stage  - Instruct patient to call for assistance with activity based on assessment  - Request Rehabilitation consult to assist with strengthening/weightbearing, etc   Outcome: Progressing     Problem: DISCHARGE PLANNING  Goal: Discharge to home or other facility with appropriate resources  Description  INTERVENTIONS:  - Identify barriers to discharge w/patient and caregiver  - Arrange for needed discharge resources and transportation as appropriate  - Identify discharge learning needs (meds, wound care, etc )  - Arrange for interpretive services to assist at discharge as needed  - Refer to Case Management Department for coordinating discharge planning if the patient needs post-hospital services based on physician/advanced practitioner order or complex needs related to functional status, cognitive ability, or social support system  Outcome: Progressing     Problem: POSTPARTUM  Goal: Experiences normal postpartum course  Description  INTERVENTIONS:  - Monitor maternal vital signs  - Assess uterine involution and lochia  Outcome: Progressing  Goal: Appropriate maternal -  bonding  Description  INTERVENTIONS:  - Identify family support  - Assess for appropriate maternal/infant bonding   -Encourage maternal/infant bonding opportunities  - Referral to  or  as needed  Outcome: Progressing  Goal: Establishment of infant feeding pattern  Description  INTERVENTIONS:  - Assess breast/bottle feeding  - Refer to lactation as needed  Outcome: Progressing  Goal: Incision(s), wounds(s) or drain site(s) healing without S/S of infection  Description  INTERVENTIONS  - Assess and document risk factors for skin impairment   - Assess and document dressing, incision, wound bed, drain sites and surrounding tissue  - Initiate Nutrition services consult and/or wound management as needed  Outcome: Progressing

## 2019-08-05 NOTE — ANESTHESIA POSTPROCEDURE EVALUATION
Post-Op Assessment Note    CV Status:  Stable  Pain Score: 0    Pain management: adequate     Mental Status:  Alert and awake   Hydration Status:  Euvolemic   PONV Controlled:  Controlled   Airway Patency:  Patent   Post Op Vitals Reviewed: Yes        Post-op block assessment: no complications        BP      Temp      Pulse     Resp      SpO2

## 2019-08-05 NOTE — LACTATION NOTE
This note was copied from a baby's chart  CONSULT - LACTATION  Baby Girl Carmen Briones 1 days female MRN: [id number]    [facility] Room / Bed: (N)/ 316(N) Encounter: [id number]    Maternal Information     MOTHER:  Emory Chao  Maternal Age: 34 y o    OB History: #: 1, Date: None, Sex: None, Weight: None, GA: None, Delivery: None, Apgar1: None, Apgar5: None, Living: None, Birth Comments: None    #: 2, Date: 19, Sex: Female, Weight: 3572 g (7 lb 14 oz), GA: 41w6d, Delivery: Vaginal, Spontaneous, Apgar1: 8, Apgar5: 9, Living: Living, Birth Comments: None   Previouse breast reduction surgery? No    Lactation history:   Has patient previously breast fed: No   How long had patient previously breast fed:     Previous breast feeding complications:       Past Surgical History:   Procedure Laterality Date    NO PAST SURGERIES      WISDOM TOOTH EXTRACTION  age 23       Birth information:  YOB: 2017   Time of birth: 6:32 PM   Sex: female   Delivery type: Vaginal, Spontaneous   Birth Weight: 3572 g (7 lb 14 oz)   Percent of Weight Change: 0%     Gestational Age: 45w8d   [unfilled]    Assessment     Breast and nipple assessment: normal assessment    [place] Assessment: normal assessment    Feeding assessment: feeding well  LATCH:  Latch: Grasps breast, tongue down, lips flanged, rhythmic sucking   Audible Swallowing: Spontaneous and intermittent (24 hours old)   Type of Nipple: Everted (After stimulation)   Comfort (Breast/Nipple): Soft/non-tender   Hold (Positioning): Partial assist, teach one side, mother does other, staff holds   LATCH Score: 9          Feeding recommendations:  breast feed on demand     Some tenderness and discoloration noted at fronts of nipples  Lanolin supplied  Worked on positioning infant up at chest level and starting to feed infant with nose arriving at the nipple   Then, using areolar compression to achieve a deep latch that is comfortable and exchanges optimum amounts of milk  Discussed 2nd night syndrome and ways to calm infant  Hand out given  Information on hand expression given  Discussed benefits of knowing how to manually express breast including stimulating milk supply, softening nipple for latch and evacuating breast in the event of engorgement  Met with mother  Provided mother with Ready, Set, Baby booklet  Discussed Skin to Skin contact an benefits to mom and baby  Talked about the delay of the first bath until baby has adjusted  Spoke about the benefits of rooming in  Feeding on cue and what that means for recognizing infant's hunger  Avoidance of pacifiers for the first month discussed  Talked about exclusive breastfeeding for the first 6 months  Positioning and latch reviewed as well as showing images of other feeding positions  Discussed the properties of a good latch in any position  Reviewed hand/manual expression  Discussed s/s that baby is getting enough milk and some s/s that breastfeeding dyad may need further help  Gave information on common concerns, what to expect the first few weeks after delivery, preparing for other caregivers, and how partners can help  Resources for support also provided  Encouraged parents to call for assistance, questions, and concerns about breastfeeding  Extension provided    Narinder Torres RN 8/5/2019 4:00 PM

## 2019-08-05 NOTE — PLAN OF CARE
Problem: BIRTH - VAGINAL/ SECTION  Goal: Fetal and maternal status remain reassuring during the birth process  Description  INTERVENTIONS:  - Monitor vital signs  - Monitor fetal heart rate  - Monitor uterine activity  - Monitor labor progression (vaginal delivery)  - DVT prophylaxis  - Antibiotic prophylaxis  Outcome: Progressing  Goal: Emotionally satisfying birthing experience for mother/fetus  Description  Interventions:  - Assess, plan, implement and evaluate the nursing care given to the patient in labor  - Advocate the philosophy that each childbirth experience is a unique experience and support the family's chosen level of involvement and control during the labor process   - Actively participate in both the patient's and family's teaching of the birth process  - Consider cultural, Synagogue and age-specific factors and plan care for the patient in labor  Outcome: Progressing     Problem: Knowledge Deficit  Goal: Verbalizes understanding of labor plan  Description  Assess patient/family/caregiver's baseline knowledge level and ability to understand information  Provide education via patient/family/caregiver's preferred learning method at appropriate level of understanding  1  Provide teaching at level of understanding  2  Provide teaching via preferred learning method(s)  Outcome: Progressing  Goal: Patient/family/caregiver demonstrates understanding of disease process, treatment plan, medications, and discharge instructions  Description  Complete learning assessment and assess knowledge base    Interventions:  - Provide teaching at level of understanding  - Provide teaching via preferred learning methods  Outcome: Progressing     Problem: PAIN - ADULT  Goal: Verbalizes/displays adequate comfort level or baseline comfort level  Description  Interventions:  - Encourage patient to monitor pain and request assistance  - Assess pain using appropriate pain scale  - Administer analgesics based on type and severity of pain and evaluate response  - Implement non-pharmacological measures as appropriate and evaluate response  - Consider cultural and social influences on pain and pain management  - Notify physician/advanced practitioner if interventions unsuccessful or patient reports new pain  Outcome: Progressing     Problem: INFECTION - ADULT  Goal: Absence or prevention of progression during hospitalization  Description  INTERVENTIONS:  - Assess and monitor for signs and symptoms of infection  - Monitor lab/diagnostic results  - Monitor all insertion sites, i e  indwelling lines, tubes, and drains  - Monitor endotracheal (as able) and nasal secretions for changes in amount and color  - [place] appropriate cooling/warming therapies per order  - Administer medications as ordered  - Instruct and encourage patient and family to use good hand hygiene technique  - Identify and instruct in appropriate isolation precautions for identified infection/condition  Outcome: Progressing  Goal: Absence of fever/infection during neutropenic period  Description  INTERVENTIONS:  - Monitor WBC  - Implement neutropenic guidelines  Outcome: Progressing     Problem: SAFETY ADULT  Goal: Patient will remain free of falls  Description  INTERVENTIONS:  - Assess patient frequently for physical needs  -  Identify cognitive and physical deficits and behaviors that affect risk of falls    -  [place] fall precautions as indicated by assessment   - Educate patient/family on patient safety including physical limitations  - Instruct patient to call for assistance with activity based on assessment  - Modify environment to reduce risk of injury  - Consider OT/PT consult to assist with strengthening/mobility  Outcome: Progressing  Goal: Maintain or return to baseline ADL function  Description  INTERVENTIONS:  -  Assess patient's ability to carry out ADLs; assess patient's baseline for ADL function and identify physical deficits which impact ability to perform ADLs (bathing, care of mouth/teeth, toileting, grooming, dressing, etc )  - Assess/evaluate cause of self-care deficits   - Assess range of motion  - Assess patient's mobility; develop plan if impaired  - Assess patient's need for assistive devices and provide as appropriate  - Encourage maximum independence but intervene and supervise when necessary  ¯ Involve family in performance of ADLs  ¯ Assess for home care needs following discharge   ¯ Request OT consult to assist with ADL evaluation and planning for discharge  ¯ Provide patient education as appropriate  Outcome: Progressing  Goal: Maintain or return mobility status to optimal level  Description  INTERVENTIONS:  - Assess patient's baseline mobility status (ambulation, transfers, stairs, etc )    - Identify cognitive and physical deficits and behaviors that affect mobility  - Identify mobility aids required to assist with transfers and/or ambulation (gait belt, sit-to-stand, lift, walker, cane, etc )  - [place] fall precautions as indicated by assessment  - Record patient progress and toleration of activity level on Mobility SBAR; progress patient to next Phase/Stage  - Instruct patient to call for assistance with activity based on assessment  - Request Rehabilitation consult to assist with strengthening/weightbearing, etc   Outcome: Progressing     Problem: DISCHARGE PLANNING  Goal: Discharge to home or other facility with appropriate resources  Description  INTERVENTIONS:  - Identify barriers to discharge w/patient and caregiver  - Arrange for needed discharge resources and transportation as appropriate  - Identify discharge learning needs (meds, wound care, etc )  - Arrange for interpretive services to assist at discharge as needed  - Refer to Case Management Department for coordinating discharge planning if the patient needs post-hospital services based on physician/advanced practitioner order or complex needs related to functional status, cognitive ability, or social support system  Outcome: Progressing     Problem: POSTPARTUM  Goal: Experiences normal postpartum course  Description  INTERVENTIONS:  - Monitor maternal vital signs  - Assess uterine involution and lochia  Outcome: Progressing  Goal: Appropriate maternal -  bonding  Description  INTERVENTIONS:  - Identify family support  - Assess for appropriate maternal/infant bonding   -Encourage maternal/infant bonding opportunities  - Referral to  or  as needed  Outcome: Progressing  Goal: Establishment of infant feeding pattern  Description  INTERVENTIONS:  - Assess breast/bottle feeding  - Refer to lactation as needed  Outcome: Progressing  Goal: Incision(s), wounds(s) or drain site(s) healing without S/S of infection  Description  INTERVENTIONS  - Assess and document risk factors for skin impairment   - Assess and document dressing, incision, wound bed, drain sites and surrounding tissue  - Initiate Nutrition services consult and/or wound management as needed  Outcome: Progressing

## 2019-08-06 ENCOUNTER — TRANSITIONAL CARE MANAGEMENT (OUTPATIENT)
Dept: FAMILY MEDICINE CLINIC | Facility: CLINIC | Age: 29
End: 2019-08-06

## 2019-08-06 VITALS
BODY MASS INDEX: 35.7 KG/M2 | HEART RATE: 72 BPM | OXYGEN SATURATION: 98 % | RESPIRATION RATE: 20 BRPM | DIASTOLIC BLOOD PRESSURE: 67 MMHG | WEIGHT: 241 LBS | HEIGHT: 69 IN | SYSTOLIC BLOOD PRESSURE: 130 MMHG | TEMPERATURE: 98.3 F

## 2019-08-06 PROBLEM — O48.0 POST-TERM PREGNANCY, 40-42 WEEKS OF GESTATION: Status: RESOLVED | Noted: 2019-07-25 | Resolved: 2019-08-06

## 2019-08-06 PROBLEM — O99.213 MATERNAL OBESITY, ANTEPARTUM, THIRD TRIMESTER: Status: RESOLVED | Noted: 2019-04-30 | Resolved: 2019-08-06

## 2019-08-06 PROCEDURE — 99024 POSTOP FOLLOW-UP VISIT: CPT | Performed by: OBSTETRICS & GYNECOLOGY

## 2019-08-06 RX ORDER — IBUPROFEN 600 MG/1
600 TABLET ORAL EVERY 6 HOURS PRN
Qty: 30 TABLET | Refills: 0 | Status: SHIPPED | OUTPATIENT
Start: 2019-08-06 | End: 2019-08-27

## 2019-08-06 RX ORDER — DIAPER,BRIEF,INFANT-TODD,DISP
1 EACH MISCELLANEOUS AS NEEDED
Qty: 30 G | Refills: 0 | Status: SHIPPED | OUTPATIENT
Start: 2019-08-06 | End: 2019-08-27

## 2019-08-06 RX ORDER — ACETAMINOPHEN 325 MG/1
650 TABLET ORAL EVERY 4 HOURS PRN
Qty: 30 TABLET | Refills: 0 | Status: SHIPPED | OUTPATIENT
Start: 2019-08-06 | End: 2019-08-27

## 2019-08-06 RX ADMIN — PRENATAL VIT W/ FE FUMARATE-FA TAB 27-0.8 MG 1 TABLET: 27-0.8 TAB at 08:38

## 2019-08-06 RX ADMIN — DOCUSATE SODIUM 100 MG: 100 CAPSULE, LIQUID FILLED ORAL at 08:38

## 2019-08-06 NOTE — PLAN OF CARE
Problem: Knowledge Deficit  Goal: Verbalizes understanding of labor plan  Description  Assess patient/family/caregiver's baseline knowledge level and ability to understand information  Provide education via patient/family/caregiver's preferred learning method at appropriate level of understanding  1  Provide teaching at level of understanding  2  Provide teaching via preferred learning method(s)  Outcome: Completed  Goal: Patient/family/caregiver demonstrates understanding of disease process, treatment plan, medications, and discharge instructions  Description  Complete learning assessment and assess knowledge base    Interventions:  - Provide teaching at level of understanding  - Provide teaching via preferred learning methods  Outcome: Completed     Problem: PAIN - ADULT  Goal: Verbalizes/displays adequate comfort level or baseline comfort level  Description  Interventions:  - Encourage patient to monitor pain and request assistance  - Assess pain using appropriate pain scale  - Administer analgesics based on type and severity of pain and evaluate response  - Implement non-pharmacological measures as appropriate and evaluate response  - Consider cultural and social influences on pain and pain management  - Notify physician/advanced practitioner if interventions unsuccessful or patient reports new pain  Outcome: Completed     Problem: INFECTION - ADULT  Goal: Absence or prevention of progression during hospitalization  Description  INTERVENTIONS:  - Assess and monitor for signs and symptoms of infection  - Monitor lab/diagnostic results  - Monitor all insertion sites, i e  indwelling lines, tubes, and drains  - Monitor endotracheal (as able) and nasal secretions for changes in amount and color  - [place] appropriate cooling/warming therapies per order  - Administer medications as ordered  - Instruct and encourage patient and family to use good hand hygiene technique  - Identify and instruct in appropriate isolation precautions for identified infection/condition  Outcome: Completed  Goal: Absence of fever/infection during neutropenic period  Description  INTERVENTIONS:  - Monitor WBC  - Implement neutropenic guidelines  Outcome: Completed     Problem: SAFETY ADULT  Goal: Patient will remain free of falls  Description  INTERVENTIONS:  - Assess patient frequently for physical needs  -  Identify cognitive and physical deficits and behaviors that affect risk of falls    -  [place] fall precautions as indicated by assessment   - Educate patient/family on patient safety including physical limitations  - Instruct patient to call for assistance with activity based on assessment  - Modify environment to reduce risk of injury  - Consider OT/PT consult to assist with strengthening/mobility  Outcome: Completed  Goal: Maintain or return to baseline ADL function  Description  INTERVENTIONS:  -  Assess patient's ability to carry out ADLs; assess patient's baseline for ADL function and identify physical deficits which impact ability to perform ADLs (bathing, care of mouth/teeth, toileting, grooming, dressing, etc )  - Assess/evaluate cause of self-care deficits   - Assess range of motion  - Assess patient's mobility; develop plan if impaired  - Assess patient's need for assistive devices and provide as appropriate  - Encourage maximum independence but intervene and supervise when necessary  ¯ Involve family in performance of ADLs  ¯ Assess for home care needs following discharge   ¯ Request OT consult to assist with ADL evaluation and planning for discharge  ¯ Provide patient education as appropriate  Outcome: Completed  Goal: Maintain or return mobility status to optimal level  Description  INTERVENTIONS:  - Assess patient's baseline mobility status (ambulation, transfers, stairs, etc )    - Identify cognitive and physical deficits and behaviors that affect mobility  - Identify mobility aids required to assist with transfers and/or ambulation (gait belt, sit-to-stand, lift, walker, cane, etc )  - [place] fall precautions as indicated by assessment  - Record patient progress and toleration of activity level on Mobility SBAR; progress patient to next Phase/Stage  - Instruct patient to call for assistance with activity based on assessment  - Request Rehabilitation consult to assist with strengthening/weightbearing, etc   Outcome: Completed     Problem: DISCHARGE PLANNING  Goal: Discharge to home or other facility with appropriate resources  Description  INTERVENTIONS:  - Identify barriers to discharge w/patient and caregiver  - Arrange for needed discharge resources and transportation as appropriate  - Identify discharge learning needs (meds, wound care, etc )  - Arrange for interpretive services to assist at discharge as needed  - Refer to Case Management Department for coordinating discharge planning if the patient needs post-hospital services based on physician/advanced practitioner order or complex needs related to functional status, cognitive ability, or social support system  Outcome: Completed     Problem: POSTPARTUM  Goal: Experiences normal postpartum course  Description  INTERVENTIONS:  - Monitor maternal vital signs  - Assess uterine involution and lochia  Outcome: Completed  Goal: Appropriate maternal -  bonding  Description  INTERVENTIONS:  - Identify family support  - Assess for appropriate maternal/infant bonding   -Encourage maternal/infant bonding opportunities  - Referral to  or  as needed  Outcome: Completed  Goal: Establishment of infant feeding pattern  Description  INTERVENTIONS:  - Assess breast/bottle feeding  - Refer to lactation as needed  Outcome: Completed  Goal: Incision(s), wounds(s) or drain site(s) healing without S/S of infection  Description  INTERVENTIONS  - Assess and document risk factors for skin impairment   - Assess and document dressing, incision, wound bed, drain sites and surrounding tissue  - Initiate Nutrition services consult and/or wound management as needed  Outcome: Completed     Problem: INADEQUATE LATCH, SUCK OR SWALLOW  Goal: Demonstrate ability to latch and sustain latch, audible swallowing and satiety  Description  INTERVENTIONS:  - Assess oral anatomy, notify Physician/AP for abnormal findings  - Establish milk expression  - Maximize feeding opportunity (skin to skin, behavioral state)  - Position/latch techniques  - Discourage use of pacifier-artificial nipple  - Mechanical pumping  - Nipple Shield  - Supplemental formula feeding (Physician/AP order)  - Alternative feeding method  Outcome: Completed

## 2019-08-06 NOTE — PROGRESS NOTES
Progress Note - OB/GYN   Colin Mayers 34 y o  female MRN: [id number]  Unit/Bed#: -01 Encounter: [id number]    Assessment:  Post partum day #2 s/p , stable    Plan:  1  Continue routine post partum care   -Encourage ambulation   -Encourage breastfeeding   -Anticipate discharge today    2  Elevated blood pressure reading without diagnosis of preeclampsia   -/65 last PM   -Patient states this was after she was walking   -Repeat BP this AM WNL    Subjective/Objective   Chief Complaint:   Post delivery    Subjective:     Pain: yes, cramping, improved with meds  Tolerating PO: yes  Voiding: yes  Flatus: yes  BM: no  Ambulating: yes  Breastfeeding:  Yes  Chest pain: no  Shortness of breath: no  Leg pain: no  Lochia: minimal    Objective:     Vitals: /67   Pulse 72   Temp 98 3 °F (36 8 °C) (Oral)   Resp 20   Ht 5' 9" (1 753 m)   Wt 109 kg (241 lb)   LMP 10/15/2018   SpO2 98%   Breastfeeding?  Yes   BMI 35 59 kg/m²     No intake or output data in the 24 hours ending 19 0741    Lab Results   Component Value Date    WBC 9 84 2019    HGB 11 2 (L) 2019    HCT 32 8 (L) 2019    MCV 86 2019     2019       Current Facility-Administered Medications   Medication Dose Route Frequency    acetaminophen (TYLENOL) tablet 650 mg  650 mg Oral Q4H PRN    albuterol (PROVENTIL HFA,VENTOLIN HFA) inhaler 2 puff  2 puff Inhalation Q4H PRN    calcium carbonate (TUMS) chewable tablet 1,000 mg  1,000 mg Oral Daily PRN    docusate sodium (COLACE) capsule 100 mg  100 mg Oral BID    hydrocortisone 1 % cream 1 application  1 application Topical PRN    ibuprofen (MOTRIN) tablet 600 mg  600 mg Oral Q6H PRN    prenatal multivitamin tablet 1 tablet  1 tablet Oral Daily    simethicone (MYLICON) chewable tablet 80 mg  80 mg Oral 4x Daily PRN    witch hazel-glycerin (TUCKS) topical pad 1 pad  1 pad Topical PRN       Physical Exam:     Gen: AAOx3, NAD  CV: RRR  Lungs: CTA b/l  Abd: Soft, non-tender, non-distended, no rebound or guarding  Uterine fundus firm and non-tender, 2 cm below umbilicus     Ext: Non tender    Hardeep Gutierrez DO  PGY1 - Emergency Medicine  OB/GYN  8/6/2019  7:41 AM

## 2019-08-06 NOTE — LACTATION NOTE
This note was copied from a baby's chart  Infant at 2 4% weight loss  One void and 3 stools noted since delivery at 42 hours of age  Offered assistance scheduling with outpatient lactation  Keven herron assistance at this time  Contact information provided for Shanel Hammonds [facility]  Met with mother to go over feeding log since birth for the first week  Emphasized 8 or more (12) feedings in a 24 hour period, what to expect for the number of diapers per day of life and the progression of properties of the  stooling pattern  Discussed s/s that breastfeeding is going well after day 4 and when to get help from a pediatrician or lactation support person after day 4  Booklet included Breast Pumping Instructions, When You Go Back to Work or School, and Breastfeeding Resources for after discharge including access to the number for the [id number] [facility]  Powerpoints given on mom/ care class and breastfeeding class at patient request     Discussed s/s engorgement and how to manage with medications and cool compresses as well as s/s mastitis and when to contact physician  Encouraged mom to continue taking prenatal vitamins for the whole length of time she is breastfeeding and continue for another 6 weeks once infant is weaned  Encouraged parents to call for assistance, questions, and concerns about breastfeeding  Extension provided

## 2019-08-06 NOTE — PLAN OF CARE
Problem: INADEQUATE LATCH, SUCK OR SWALLOW  Goal: Demonstrate ability to latch and sustain latch, audible swallowing and satiety  Description  INTERVENTIONS:  - Assess oral anatomy, notify Physician/AP for abnormal findings  - Establish milk expression  - Maximize feeding opportunity (skin to skin, behavioral state)  - Position/latch techniques  - Discourage use of pacifier-artificial nipple  - Mechanical pumping  - Nipple Shield  - Supplemental formula feeding (Physician/AP order)  - Alternative feeding method  Outcome: Progressing

## 2019-08-06 NOTE — PLAN OF CARE
Problem: Knowledge Deficit  Goal: Verbalizes understanding of labor plan  Description  Assess patient/family/caregiver's baseline knowledge level and ability to understand information  Provide education via patient/family/caregiver's preferred learning method at appropriate level of understanding  1  Provide teaching at level of understanding  2  Provide teaching via preferred learning method(s)  Outcome: Progressing  Goal: Patient/family/caregiver demonstrates understanding of disease process, treatment plan, medications, and discharge instructions  Description  Complete learning assessment and assess knowledge base    Interventions:  - Provide teaching at level of understanding  - Provide teaching via preferred learning methods  Outcome: Progressing     Problem: PAIN - ADULT  Goal: Verbalizes/displays adequate comfort level or baseline comfort level  Description  Interventions:  - Encourage patient to monitor pain and request assistance  - Assess pain using appropriate pain scale  - Administer analgesics based on type and severity of pain and evaluate response  - Implement non-pharmacological measures as appropriate and evaluate response  - Consider cultural and social influences on pain and pain management  - Notify physician/advanced practitioner if interventions unsuccessful or patient reports new pain  Outcome: Progressing     Problem: INFECTION - ADULT  Goal: Absence or prevention of progression during hospitalization  Description  INTERVENTIONS:  - Assess and monitor for signs and symptoms of infection  - Monitor lab/diagnostic results  - Monitor all insertion sites, i e  indwelling lines, tubes, and drains  - Monitor endotracheal (as able) and nasal secretions for changes in amount and color  - [place] appropriate cooling/warming therapies per order  - Administer medications as ordered  - Instruct and encourage patient and family to use good hand hygiene technique  - Identify and instruct in appropriate isolation precautions for identified infection/condition  Outcome: Progressing  Goal: Absence of fever/infection during neutropenic period  Description  INTERVENTIONS:  - Monitor WBC  - Implement neutropenic guidelines  Outcome: Progressing     Problem: SAFETY ADULT  Goal: Patient will remain free of falls  Description  INTERVENTIONS:  - Assess patient frequently for physical needs  -  Identify cognitive and physical deficits and behaviors that affect risk of falls    -  [place] fall precautions as indicated by assessment   - Educate patient/family on patient safety including physical limitations  - Instruct patient to call for assistance with activity based on assessment  - Modify environment to reduce risk of injury  - Consider OT/PT consult to assist with strengthening/mobility  Outcome: Progressing  Goal: Maintain or return to baseline ADL function  Description  INTERVENTIONS:  -  Assess patient's ability to carry out ADLs; assess patient's baseline for ADL function and identify physical deficits which impact ability to perform ADLs (bathing, care of mouth/teeth, toileting, grooming, dressing, etc )  - Assess/evaluate cause of self-care deficits   - Assess range of motion  - Assess patient's mobility; develop plan if impaired  - Assess patient's need for assistive devices and provide as appropriate  - Encourage maximum independence but intervene and supervise when necessary  ¯ Involve family in performance of ADLs  ¯ Assess for home care needs following discharge   ¯ Request OT consult to assist with ADL evaluation and planning for discharge  ¯ Provide patient education as appropriate  Outcome: Progressing  Goal: Maintain or return mobility status to optimal level  Description  INTERVENTIONS:  - Assess patient's baseline mobility status (ambulation, transfers, stairs, etc )    - Identify cognitive and physical deficits and behaviors that affect mobility  - Identify mobility aids required to assist with transfers and/or ambulation (gait belt, sit-to-stand, lift, walker, cane, etc )  - [place] fall precautions as indicated by assessment  - Record patient progress and toleration of activity level on Mobility SBAR; progress patient to next Phase/Stage  - Instruct patient to call for assistance with activity based on assessment  - Request Rehabilitation consult to assist with strengthening/weightbearing, etc   Outcome: Progressing     Problem: DISCHARGE PLANNING  Goal: Discharge to home or other facility with appropriate resources  Description  INTERVENTIONS:  - Identify barriers to discharge w/patient and caregiver  - Arrange for needed discharge resources and transportation as appropriate  - Identify discharge learning needs (meds, wound care, etc )  - Arrange for interpretive services to assist at discharge as needed  - Refer to Case Management Department for coordinating discharge planning if the patient needs post-hospital services based on physician/advanced practitioner order or complex needs related to functional status, cognitive ability, or social support system  Outcome: Progressing     Problem: POSTPARTUM  Goal: Experiences normal postpartum course  Description  INTERVENTIONS:  - Monitor maternal vital signs  - Assess uterine involution and lochia  Outcome: Progressing  Goal: Appropriate maternal -  bonding  Description  INTERVENTIONS:  - Identify family support  - Assess for appropriate maternal/infant bonding   -Encourage maternal/infant bonding opportunities  - Referral to  or  as needed  Outcome: Progressing  Goal: Establishment of infant feeding pattern  Description  INTERVENTIONS:  - Assess breast/bottle feeding  - Refer to lactation as needed  Outcome: Progressing  Goal: Incision(s), wounds(s) or drain site(s) healing without S/S of infection  Description  INTERVENTIONS  - Assess and document risk factors for skin impairment   - Assess and document dressing, incision, wound bed, drain sites and surrounding tissue  - Initiate Nutrition services consult and/or wound management as needed  Outcome: Progressing     Problem: INADEQUATE LATCH, SUCK OR SWALLOW  Goal: Demonstrate ability to latch and sustain latch, audible swallowing and satiety  Description  INTERVENTIONS:  - Assess oral anatomy, notify Physician/AP for abnormal findings  - Establish milk expression  - Maximize feeding opportunity (skin to skin, behavioral state)  - Position/latch techniques  - Discourage use of pacifier-artificial nipple  - Mechanical pumping  - Nipple Shield  - Supplemental formula feeding (Physician/AP order)  - Alternative feeding method  Outcome: Progressing

## 2019-08-06 NOTE — PLAN OF CARE
Problem: Knowledge Deficit  Goal: Verbalizes understanding of labor plan  Description  Assess patient/family/caregiver's baseline knowledge level and ability to understand information  Provide education via patient/family/caregiver's preferred learning method at appropriate level of understanding  1  Provide teaching at level of understanding  2  Provide teaching via preferred learning method(s)  Outcome: Progressing  Goal: Patient/family/caregiver demonstrates understanding of disease process, treatment plan, medications, and discharge instructions  Description  Complete learning assessment and assess knowledge base    Interventions:  - Provide teaching at level of understanding  - Provide teaching via preferred learning methods  Outcome: Progressing     Problem: PAIN - ADULT  Goal: Verbalizes/displays adequate comfort level or baseline comfort level  Description  Interventions:  - Encourage patient to monitor pain and request assistance  - Assess pain using appropriate pain scale  - Administer analgesics based on type and severity of pain and evaluate response  - Implement non-pharmacological measures as appropriate and evaluate response  - Consider cultural and social influences on pain and pain management  - Notify physician/advanced practitioner if interventions unsuccessful or patient reports new pain  Outcome: Progressing     Problem: INFECTION - ADULT  Goal: Absence or prevention of progression during hospitalization  Description  INTERVENTIONS:  - Assess and monitor for signs and symptoms of infection  - Monitor lab/diagnostic results  - Monitor all insertion sites, i e  indwelling lines, tubes, and drains  - Monitor endotracheal (as able) and nasal secretions for changes in amount and color  - [place] appropriate cooling/warming therapies per order  - Administer medications as ordered  - Instruct and encourage patient and family to use good hand hygiene technique  - Identify and instruct in appropriate isolation precautions for identified infection/condition  Outcome: Progressing  Goal: Absence of fever/infection during neutropenic period  Description  INTERVENTIONS:  - Monitor WBC  - Implement neutropenic guidelines  Outcome: Progressing     Problem: SAFETY ADULT  Goal: Patient will remain free of falls  Description  INTERVENTIONS:  - Assess patient frequently for physical needs  -  Identify cognitive and physical deficits and behaviors that affect risk of falls    -  [place] fall precautions as indicated by assessment   - Educate patient/family on patient safety including physical limitations  - Instruct patient to call for assistance with activity based on assessment  - Modify environment to reduce risk of injury  - Consider OT/PT consult to assist with strengthening/mobility  Outcome: Progressing  Goal: Maintain or return to baseline ADL function  Description  INTERVENTIONS:  -  Assess patient's ability to carry out ADLs; assess patient's baseline for ADL function and identify physical deficits which impact ability to perform ADLs (bathing, care of mouth/teeth, toileting, grooming, dressing, etc )  - Assess/evaluate cause of self-care deficits   - Assess range of motion  - Assess patient's mobility; develop plan if impaired  - Assess patient's need for assistive devices and provide as appropriate  - Encourage maximum independence but intervene and supervise when necessary  ¯ Involve family in performance of ADLs  ¯ Assess for home care needs following discharge   ¯ Request OT consult to assist with ADL evaluation and planning for discharge  ¯ Provide patient education as appropriate  Outcome: Progressing  Goal: Maintain or return mobility status to optimal level  Description  INTERVENTIONS:  - Assess patient's baseline mobility status (ambulation, transfers, stairs, etc )    - Identify cognitive and physical deficits and behaviors that affect mobility  - Identify mobility aids required to assist with transfers and/or ambulation (gait belt, sit-to-stand, lift, walker, cane, etc )  - [place] fall precautions as indicated by assessment  - Record patient progress and toleration of activity level on Mobility SBAR; progress patient to next Phase/Stage  - Instruct patient to call for assistance with activity based on assessment  - Request Rehabilitation consult to assist with strengthening/weightbearing, etc   Outcome: Progressing     Problem: DISCHARGE PLANNING  Goal: Discharge to home or other facility with appropriate resources  Description  INTERVENTIONS:  - Identify barriers to discharge w/patient and caregiver  - Arrange for needed discharge resources and transportation as appropriate  - Identify discharge learning needs (meds, wound care, etc )  - Arrange for interpretive services to assist at discharge as needed  - Refer to Case Management Department for coordinating discharge planning if the patient needs post-hospital services based on physician/advanced practitioner order or complex needs related to functional status, cognitive ability, or social support system  Outcome: Progressing     Problem: POSTPARTUM  Goal: Experiences normal postpartum course  Description  INTERVENTIONS:  - Monitor maternal vital signs  - Assess uterine involution and lochia  Outcome: Progressing  Goal: Appropriate maternal -  bonding  Description  INTERVENTIONS:  - Identify family support  - Assess for appropriate maternal/infant bonding   -Encourage maternal/infant bonding opportunities  - Referral to  or  as needed  Outcome: Progressing  Goal: Establishment of infant feeding pattern  Description  INTERVENTIONS:  - Assess breast/bottle feeding  - Refer to lactation as needed  Outcome: Progressing  Goal: Incision(s), wounds(s) or drain site(s) healing without S/S of infection  Description  INTERVENTIONS  - Assess and document risk factors for skin impairment   - Assess and document dressing, incision, wound bed, drain sites and surrounding tissue  - Initiate Nutrition services consult and/or wound management as needed  Outcome: Progressing

## 2019-08-08 ENCOUNTER — TELEPHONE (OUTPATIENT)
Dept: OBGYN CLINIC | Facility: CLINIC | Age: 29
End: 2019-08-08

## 2019-08-08 NOTE — TELEPHONE ENCOUNTER
Pt passes plum size clot - dark blue and stringy  She is not bleeding heavily - uses 2 pads/day  No odor  Pt BF  Has sore nipples  Can call [facility]  I told pt, normal to have clots ff delivery - to monitor - if heavy - tcb  No cramping with passing of clot

## 2019-08-08 NOTE — UTILIZATION REVIEW
Notification of Maternity Inpatient Admission/Maternity Inpatient Authorization Request  This is a Notification of Maternity Inpatient Admission/Maternity Inpatient Authorization Request to our facility [address] [place] [address]  Please be advised that this patient is currently in our facility under Inpatient Status  Below you will find the Birth/ Summary, Attending Physician and Facilitys information including NPI#  and contact information for the Utilization Review Department where the patient is receiving care services  Facility: [address] [place] [address]  Address: [address] [place] [address], [address] [place] [address]  Phone: [phone] Tax ID: [id number]  NPI: [id number]  MEDICARE ID: [id number]    Place of Service Code: 24   Place of Service Name: Inpatient Hospital  Presentation Date & Time: 2019  8:26 PM  Inpatient Admission Date & Time: 19  Discharge Date & Time: 2019 12:40 PM   Discharge Disposition (if discharged): Home/Self Care  Attending Physician & NPI: Mana Deleon Md [[id number]]  NELSY Watkins  National [employer] ID- [id number]  Primary Office:  [address] [place] [address]  Phone [phone]  Fax: [phone]    Mother of  Information: Dwayne Mckeon   MRN: [id number] YOB: 1990   Mother's Admitting Diagnosis: Encounter for full-term uncomplicated delivery [M43]  Estimated Date of Delivery: 19  Type of Delivery: Vaginal, Spontaneous    Delivering clinician: Mana Deleon   OB History as of 2019        2    Para   1    Term   1            AB   1    Living   1       SAB        TAB        Ectopic        Multiple   0    Live Births   1                Name & MRN:   Information for the patient's :  Kristi Prabhakar [[id number]]     [place] Delivery Information:  Sex: female  Delivered 2019 6:32 PM by Vaginal, Spontaneous;  Gestational Age: 45w8d    [place] Measurements:  Weight: 7 lb 14 oz (3572 g); Height: 21"    APGAR 1 minute 5 minutes 10 minutes   Totals: 8 9      Thank you,  [facility]  Utilization Review Department  Phone: [phone]; Fax [phone]  ATTENTION: Please call with any questions or concerns to [phone]  and carefully follow the prompts so that you are directed to the right person  Send all requests for admission clinical reviews, approved or denied determinations and any other requests to fax [phone]   All voicemails are confidential

## 2019-08-08 NOTE — TELEPHONE ENCOUNTER
Pt delivered 4 days ago, and she was having a bm and had a stringy glob of tissue come out of her vagina is this normal?

## 2019-08-09 ENCOUNTER — OFFICE VISIT (OUTPATIENT)
Dept: FAMILY MEDICINE CLINIC | Facility: CLINIC | Age: 29
End: 2019-08-09
Payer: COMMERCIAL

## 2019-08-09 VITALS
WEIGHT: 220 LBS | TEMPERATURE: 97.9 F | OXYGEN SATURATION: 99 % | SYSTOLIC BLOOD PRESSURE: 116 MMHG | BODY MASS INDEX: 32.58 KG/M2 | HEIGHT: 69 IN | HEART RATE: 98 BPM | DIASTOLIC BLOOD PRESSURE: 68 MMHG

## 2019-08-09 DIAGNOSIS — Z86.59 H/O MAJOR DEPRESSION: ICD-10-CM

## 2019-08-09 PROCEDURE — 3008F BODY MASS INDEX DOCD: CPT | Performed by: PHYSICIAN ASSISTANT

## 2019-08-09 PROCEDURE — 99213 OFFICE O/P EST LOW 20 MIN: CPT | Performed by: PHYSICIAN ASSISTANT

## 2019-08-09 NOTE — PROGRESS NOTES
Assessment/Plan:       Diagnoses and all orders for this visit:     (spontaneous vaginal delivery)    H/O major depression    Patient is a currently breast-feeding mother        Patient is to follow up in December for her routine yearly physical   If she has any worsening of her depression she is to follow up before that  Subjective:      Patient ID: Jorge Alberto Duenas is a 34 y o  female  Patient is here after spontaneous vaginal delivery of a female infant on 2016 after cervical ripening for a post date baby  Patient did well through her labor and delivery  Patient had no complications during her pregnancy  Patient states her bleeding is not very heavy and she is using approximately 2 pads a day  She denies any abnormal pain  She states her depression is doing well at this point  She is encouraged to follow up if she starts to notice her depression worsening  Patient is nursing so we would have to try and she has medication that would be compatible with nursing  The following portions of the patient's history were reviewed and updated as appropriate:   She has a past medical history of Asthma, Depression, Migraine, Termination of pregnancy (fetus), Urinary tract infection, and Varicella  ,  does not have any pertinent problems on file  ,   has a past surgical history that includes No past surgeries and Mammoth tooth extraction (age 23)  ,  family history includes Anxiety disorder in her father and maternal uncle; Asthma in her mother; Bipolar disorder in her mother; Breast cancer in her family; Cancer in her maternal grandfather; Depression in her maternal uncle and sister; Diabetes in her paternal aunt; Drug abuse in her sister; Post-traumatic stress disorder in her mother  ,   reports that she quit smoking about 10 months ago  Her smoking use included cigarettes  She has a 5 00 pack-year smoking history  She has never used smokeless tobacco  She reports that she drinks alcohol   She reports that she has current or past drug history  Drug: Marijuana  ,  has No Known Allergies     Current Outpatient Medications   Medication Sig Dispense Refill    acetaminophen (TYLENOL) 325 mg tablet Take 2 tablets (650 mg total) by mouth every 4 (four) hours as needed for mild pain, moderate pain, severe pain or fever 30 tablet 0    Prenatal Vit-Fe Fumarate-FA (PRENATAL VITAMIN PO) Take by mouth      PROAIR  (90 Base) MCG/ACT inhaler inhale 1 puff by mouth every 6 hours if needed  0    hydrocortisone 1 % cream Apply 1 application topically as needed for irritation (Patient not taking: Reported on 8/9/2019) 30 g 0    ibuprofen (MOTRIN) 600 mg tablet Take 1 tablet (600 mg total) by mouth every 6 (six) hours as needed for mild pain (Patient not taking: Reported on 8/9/2019) 30 tablet 0    witch hazel-glycerin (TUCKS) topical pad Apply 1 pad topically as needed for irritation (Patient not taking: Reported on 8/9/2019)  0     No current facility-administered medications for this visit  Review of Systems   Constitutional: Negative for appetite change, chills and fever  Respiratory: Negative for chest tightness and shortness of breath  Cardiovascular: Positive for leg swelling  Negative for chest pain and palpitations  Gastrointestinal: Negative for abdominal pain, constipation, diarrhea and nausea  Genitourinary: Positive for vaginal bleeding  Negative for difficulty urinating  Skin: Negative for rash  Neurological: Negative for dizziness, light-headedness and headaches  Objective:  Vitals:    08/09/19 1516   BP: 116/68   Pulse: 98   Temp: 97 9 °F (36 6 °C)   SpO2: 99%   Weight: 99 8 kg (220 lb)   Height: 5' 9" (1 753 m)     Body mass index is 32 49 kg/m²  Physical Exam   Constitutional: She is oriented to person, place, and time  She appears well-developed and well-nourished  HENT:   Head: Normocephalic     Right Ear: External ear normal    Left Ear: External ear normal  Cardiovascular: Normal rate, regular rhythm, normal heart sounds and intact distal pulses  Pulmonary/Chest: Effort normal and breath sounds normal    Abdominal: Soft  Bowel sounds are normal  She exhibits no mass  There is no hepatosplenomegaly  There is no tenderness  Neurological: She is alert and oriented to person, place, and time  Skin: Skin is warm and dry  Psychiatric: She has a normal mood and affect  Her behavior is normal  Judgment and thought content normal    Nursing note and vitals reviewed

## 2019-08-12 LAB — PLACENTA IN STORAGE: NORMAL

## 2019-08-13 ENCOUNTER — OFFICE VISIT (OUTPATIENT)
Dept: POSTPARTUM | Facility: CLINIC | Age: 29
End: 2019-08-13

## 2019-08-13 DIAGNOSIS — N64.4 MASTODYNIA: ICD-10-CM

## 2019-08-13 DIAGNOSIS — O92.29 SORE NIPPLES DUE TO LACTATION: Primary | ICD-10-CM

## 2019-08-13 NOTE — PROGRESS NOTES
INITIAL BREAST FEEDING EVALUATION    Informant/Relationship: Isabel/mom    Discussion of General Lactation Issues: At her pediatrician's visit, the doctor told Mira Shaffer that she thought [facility] is tongue tied  Mira Shaffer states that someone in the hospital might have said something as well  Mira Shaffer states that breastfeeding has been a little painful and the first latch is still painful  After she has started the pain dissipates and the initial pain is decreasing  Infant is 5days old today          History:  Fertility Problem:no  Breast changes:yes - got bigger  : yes - vaginal, s/p induction  Full term:yes - 42 weeks   labor:no  First nursing/attempt < 1 hour after birth:yes - skin to skin but didn't latch immediately, Mira Shaffer states she first latched around 3 hours post deliver  Skin to skin following delivery:yes - immediately  Breast changes after delivery:yes - 2 days, felt a lot sands  Rooming in (infant in room with mother with exception of procedures, eg  Circumcision: yes - spent the first night in the delivery at mom's request  Blood sugar issues:no  NICU stay:no  Jaundice:no  Phototherapy:no  Supplement given: (list supplement and method used as well as reason(s):no    Past Medical History:   Diagnosis Date    Asthma     childhood asthma  inhaler prn    Depression     diag with depression/anxiety age 27 -no meds now    Migraine     diag 2 yrs ago- takes extra-strength tylenol    Termination of pregnancy (fetus)     Urinary tract infection     hx of  last episode 2018    Varicella     received vaccines         Current Outpatient Medications:     acetaminophen (TYLENOL) 325 mg tablet, Take 2 tablets (650 mg total) by mouth every 4 (four) hours as needed for mild pain, moderate pain, severe pain or fever, Disp: 30 tablet, Rfl: 0    hydrocortisone 1 % cream, Apply 1 application topically as needed for irritation (Patient not taking: Reported on 2019), Disp: 30 g, Rfl: 0    ibuprofen (MOTRIN) 600 mg tablet, Take 1 tablet (600 mg total) by mouth every 6 (six) hours as needed for mild pain (Patient not taking: Reported on 2019), Disp: 30 tablet, Rfl: 0    Prenatal Vit-Fe Fumarate-FA (PRENATAL VITAMIN PO), Take by mouth, Disp: , Rfl:     PROAIR  (90 Base) MCG/ACT inhaler, inhale 1 puff by mouth every 6 hours if needed, Disp: , Rfl: 0    witch hazel-glycerin (TUCKS) topical pad, Apply 1 pad topically as needed for irritation (Patient not taking: Reported on 2019), Disp: , Rfl: 0    No Known Allergies    Social History     Substance and Sexual Activity   Drug Use Yes    Types: Marijuana    Comment: Lst used 2018       Social History     Interval Breastfeeding History:    Frequency of breast feedin-3 hours  Does mother feel breastfeeding is effective: Yes  Does infant appear satisfied after nursing:Yes  Stooling pattern normal: lYes  Urinating frequently:Yes  Using shield or shells: No    Alternative/Artificial Feedings:   Bottle: Yes, once with EBM  Cup: N/A  Syringe/Finger: N/A           Formula Type: n/a                     Amount: n/a            Breast Milk:                      Amount: 15ml            Frequency Q once Hr between feedings  Elimination Problems: No      Equipment:  Nipple Shield             Type: n/a             Size: n/a             Frequency of Use: n/a  Pump            Type: Spectra pump            Frequency of Use: once  Shells            Type: n/a            Frequency of use: n/a    Equipment Problems: no    Mom:  Breast: Normal  Nipple Assessment in General: Normal: elongated/eraser, no discoloration and healing abrasions on the tips  Mother's Awareness of Feeding Cues                 Recognizes:  Yes                  Verbalizes: Yes  Support System: FOB  History of Breastfeeding: nursed older child  Changes/Stressors/Violence: Pain with latch  Concerns/Goals: Arlene Arevalo would like to nurse without pain    Problems with Mom: Sore nipples, pain associated with latch    Physical Exam   Constitutional: She appears well-developed and well-nourished  Neck: Normal range of motion  Neck supple  No thyromegaly present  Cardiovascular: Normal rate, regular rhythm and normal heart sounds  No murmur heard  Pulmonary/Chest: Effort normal and breath sounds normal    Musculoskeletal: Normal range of motion  She exhibits no edema or tenderness  Lymphadenopathy:     She has no cervical adenopathy  She has no axillary adenopathy  Right axillary: No pectoral adenopathy present  Left axillary: No pectoral adenopathy present  Neurological: She is alert  Skin: Skin is warm  Psychiatric: She has a normal mood and affect  Her behavior is normal  Judgment and thought content normal    Nursing note and vitals reviewed  Infant:  Behaviors: Alert  Color: Pink  Birth weight: 3 572kg  Current weight: 3 68kg    Problems with infant: Tongue tie      General Appearance:  Alert, active, no distress                            Head:  Normocephalic, AFOF, sutures opposed                            Eyes:   Conjunctiva clear, no drainage                            Ears:   Normally placed, no anomolies                           Nose:   Septum intact, no drainage or erythema                          Mouth:  No lesions; Tongue pulls back when sucking examiner's finger, tongue twists to lateralize with good lift and extension to lower alveolar ridge; frenulum easily palpated with finger sweep                   Neck:  Supple, symmetrical, trachea midline, no adenopathy; thyroid: no enlargement, symmetric, no tenderness/mass/nodules                Respiratory:  No grunting, flaring, retractions, breath sounds clear and equal           Cardiovascular:  Regular rate and rhythm  No murmur  Adequate perfusion/capillary refill   Femoral pulse present                  Abdomen:    Soft, non-tender, no masses, bowel sounds present, no HSM            Genitourinary:  Normal female genitalia, anus patent                         Spine:   No abnormalities noted       Musculoskeletal:   Full range of motion         Skin/Hair/Nails:   Skin warm, dry, and intact, no rashes or abnormal dyspigmentation or lesions               Neurologic:   No abnormal movement, tone appropriate for gestational age    [place] Latch:  Efficiency:               Lips Flanged: Yes, after frenotomy              Depth of latch: Good, following frenotomy              Audible Swallow: Yes, after frenotomy              Visible Milk: Yes, after frenotomy              Wide Open/ Asymmetrical: Yes, after frenotomy              Suck Swallow Cycle: Breathing: Unlabored, Coordinated: Yes  Nipple Assessment after latch: Normal: elongated/eraser, no discoloration and no damage noted  Latch Problems: Tongue tie, improved following frenotomy and with discussion regarding ways to hold the breast and the baby to improve the latch  Position:  Infant's Ergonomics/Body               Body Alignment: Yes               Head Supported: Yes               Close to Mom's body/ Lifted/ Supported: Yes               Mom's Ergonomics/Body: Yes                           Supported:  Yes                           Sitting Back: Yes                           Brings Baby to her breast: Yes  Positioning Problems: None        Education:  Reviewed Latch: Reviewed how to gently compress the breast as if offering a sandwich  Reviewed Positioning for Dyad: Reviewed how to bring the baby to the breast so that his lower lip and chin touch the breast with her nose just above the nipple  Reviewed Frequency/Supply & Demand: Reviewed the importance of feeding on demand: when the baby appears hungry, the breasts are full, or every 3 hours during the day and every 5 hours at night counting from the beginning of one feeding to the beginning of the next, whichever is soonest   Reviewed Infant:Cues and varied States of Awareness    Plan:  Discussed history and physical exam with Darin Chow  Support given for her commitment to nursing and reassured her that the baby is gaining weight  The baby does have restricted tongue movement secondary to a tongue tie  This may lead to a sub-optimal latch that can then cause nipple trauma, nipple damage, poor milk transfer, blocked ducts, mastitis, loss of milk production, and slowed or poor weight gain  After performing a frenotomy, reviewed techniques to assist with improving the width, depth, and asymmetry of the latch  I have spent 30 minutes with Patient  today in which greater than 50% of this time was spent in counseling/coordination of care regarding Risks and benefits of tx options, Intructions for management, Patient and family education and Impressions

## 2019-08-27 ENCOUNTER — POSTPARTUM VISIT (OUTPATIENT)
Dept: OBGYN CLINIC | Age: 29
End: 2019-08-27
Payer: COMMERCIAL

## 2019-08-27 VITALS
WEIGHT: 180 LBS | BODY MASS INDEX: 26.66 KG/M2 | HEIGHT: 69 IN | DIASTOLIC BLOOD PRESSURE: 60 MMHG | SYSTOLIC BLOOD PRESSURE: 102 MMHG

## 2019-08-27 DIAGNOSIS — Z30.013 ENCOUNTER FOR INITIAL PRESCRIPTION OF INJECTABLE CONTRACEPTIVE: Primary | ICD-10-CM

## 2019-08-27 DIAGNOSIS — Z30.013 INITIATION OF DEPO PROVERA: ICD-10-CM

## 2019-08-27 DIAGNOSIS — Z32.02 URINE PREGNANCY TEST NEGATIVE: ICD-10-CM

## 2019-08-27 LAB — SL AMB POCT URINE HCG: NORMAL

## 2019-08-27 PROCEDURE — 99024 POSTOP FOLLOW-UP VISIT: CPT | Performed by: NURSE PRACTITIONER

## 2019-08-27 PROCEDURE — 81025 URINE PREGNANCY TEST: CPT | Performed by: NURSE PRACTITIONER

## 2019-08-27 PROCEDURE — 96372 THER/PROPH/DIAG INJ SC/IM: CPT | Performed by: NURSE PRACTITIONER

## 2019-08-27 RX ORDER — MEDROXYPROGESTERONE ACETATE 150 MG/ML
150 INJECTION, SUSPENSION INTRAMUSCULAR ONCE
Status: COMPLETED | OUTPATIENT
Start: 2019-08-27 | End: 2019-08-27

## 2019-08-27 RX ORDER — MEDROXYPROGESTERONE ACETATE 150 MG/ML
150 INJECTION, SUSPENSION INTRAMUSCULAR
Qty: 1 ML | Refills: 0 | Status: SHIPPED | OUTPATIENT
Start: 2019-08-27 | End: 2020-06-08 | Stop reason: SDUPTHER

## 2019-08-27 RX ORDER — MEDROXYPROGESTERONE ACETATE 150 MG/ML
150 INJECTION, SUSPENSION INTRAMUSCULAR ONCE
Status: CANCELLED | OUTPATIENT
Start: 2019-08-27 | End: 2019-08-27

## 2019-08-27 RX ADMIN — MEDROXYPROGESTERONE ACETATE 150 MG: 150 INJECTION, SUSPENSION INTRAMUSCULAR at 15:35

## 2019-08-27 NOTE — PATIENT INSTRUCTIONS
Calcium/Vitamin D Supplement (By mouth)   Calcium (MARILIN-see-um), Vitamin D (VYE-ta-min D)  Supplies your body with calcium if you need more than you get in your diet  Calcium helps prevent osteoporosis (weak or brittle bones)  Vitamin D helps your body use the calcium  Calcium and vitamin D are minerals that your body needs to work properly  Brand Name(s): Kamran-Citrate, Kamran-Citrate Plus Vitamin D, Calcet Petites, Calcium 600MG+D, Calcium Citrate +D3 Maximum, Caltrate 600 + D, Citracal + D, Citracal Calcium Citrate Petites with Vitamin D, Citracal Petites, Citracal Ultradense Calcium Citrate, Citracal Ultradense Calcium Citrate Petite w/Vit D, Citrus Calcium with Vitamin D, D-1000, D-2000, D3-400IU   There may be other brand names for this medicine  When This Medicine Should Not Be Used: You should not use this medicine if you have had an allergic reaction to calcium or vitamin D (ergocalciferol)  How to Use This Medicine:   Tablet, Long Acting Tablet, Fizzy Tablet, Liquid Filled Capsule, Chewable Tablet  · Your doctor will tell you how much medicine to use  Do not use more than directed  · Follow the instructions on the medicine label if you are using this medicine without a prescription  Ask your pharmacist or health caregiver if you are not sure how much calcium you should take in one day  · Most calcium supplements should be taken with food, but some kinds of calcium (such as calcium citrate) can be taken with or without food  Ask your health care provider or read the label on the bottle to see if you need to take your specific kind of calcium with food  Drink a full glass of water (8 ounces) with each dose  · If you are using the effervescent (fizzy) tablet, dissolve the tablet in about 6 to 8 ounces of water (3/4 cup to 1 cup)  After the tablet is completely dissolved, drink this mixture right away  Do not save any mixture to take later    · Carefully follow your doctor's instructions about any special diet   · If you need to take more than one dose each a day, take each dose at evenly spaced times, unless your doctor has told you otherwise  If a dose is missed:   · Take a dose as soon as you remember  If it is almost time for your next dose, wait until then and take a regular dose  Do not take extra medicine to make up for a missed dose  How to Store and Dispose of This Medicine:   · Store the medicine in a closed container at room temperature, away from heat, moisture, and direct light  If the effervescent (fizzy) tablet comes packaged in foil, do not open the foil until you are ready to use each tablet  · Ask your pharmacist, doctor, or health caregiver about the best way to dispose of any outdated medicine or medicine no longer needed  · Keep all medicine out of the reach of children  Never share your medicine with anyone  Drugs and Foods to Avoid:   Ask your doctor or pharmacist before using any other medicine, including over-the-counter medicines, vitamins, and herbal products  · Make sure your doctor knows if you are also using other supplements or medicines that contain calcium  Tell your doctor if you are also using gallium nitrate (Ganite®), cellulose sodium phosphate (Calcibind®), or etidronate (Didronel®)  · Calcium can change the way other medicines work if you take them at the same time  If you need to use other medicines, take them at least 2 hours before or 2 hours after you take your calcium supplement  This is particularly important if you are also using phenytoin (Dilantin®) or a tetracycline antibiotic to treat an infection (such as doxycycline, minocycline, Vibramycin®)  · Do not take your calcium supplement with a high-fiber meal (such as bran, whole-grain cereal or bread, fresh fruits)  Do not smoke cigarettes or cigars  Do not drink large amounts of alcohol or caffeine (for example, more than about 8 cups of coffee)    Warnings While Using This Medicine:   · Make sure your doctor knows if you are pregnant or breast feeding, or if you have kidney disease or have ever had kidney stones  Tell your doctor if you have had problems with too much calcium (hypercalcemia) or too little calcium in your blood (hypocalcemia)  Some health problems that can cause hypercalcemia are sarcoidosis, or problems with your parathyroid gland  · You should not use certain brands of this medicine if you have kidney disease or are on dialysis, because they may harm your kidneys  Ask your caregiver what brands are best for you  · Some health problems can affect how much calcium you should take  Tell your doctor if you have stomach or digestion problems, such as on-going diarrhea, not absorbing nutrients properly, or not having enough acid in your stomach  · This medicine might contain phenylalanine (aspartame)  This is only a concern if you have a disorder called phenylketonuria (a problem with amino acids)  If you have this condition, talk to your doctor before using this medicine  · If you are using a large amount of calcium or using it for a long time, your doctor might need to check your blood on a regular basis  Be sure to keep all appointments  Possible Side Effects While Using This Medicine:   Call your doctor right away if you notice any of these side effects:  · Headache that will not go away, dry mouth, loss of appetite, severe constipation  If you notice other side effects that you think are caused by this medicine, tell your doctor  Call your doctor for medical advice about side effects  You may report side effects to FDA at 7-217-FDA-2566  © 2017 [address] [employer] Information is for End User's use only and may not be sold, redistributed or otherwise used for commercial purposes  The above information is an  only  It is not intended as medical advice for individual conditions or treatments   Talk to your doctor, nurse or pharmacist before following any medical regimen to see if it is safe and effective for you

## 2019-08-27 NOTE — PROGRESS NOTES
Pt is here for Depo Provera injection  Her first dose was today  Her post partum visit is today  Urine pregnancy test done: Y   Result: Neg  Depo given in R Deltoid  Tolerated well  Lot [id number] Exp 12/20  Next dose due 11/12-11/26

## 2019-08-27 NOTE — PROGRESS NOTES
Postpartum Note  Brittnee Gee 34 y o  @ [phone]          Subjective: Pleasant 34 y o  here for postpartum exam  EDPS of 11  Patient feels a bit overwhelmed  Willing to try zoloft but declines counseling for now  Admits to having adequate support systems  Denies suicidal ideation or thoughts of hurting herself or her baby  Will call if no relief with meds   Baby and mom doing well otherwise  Past Medical History:   Diagnosis Date    Asthma     childhood asthma  inhaler prn    Depression     diag with depression/anxiety age 27 -no meds now    Migraine     diag 2 yrs ago- takes extra-strength tylenol    Termination of pregnancy (fetus)     Urinary tract infection     hx of  last episode 2018    Varicella     received vaccines     Past Surgical History:   Procedure Laterality Date    NO PAST SURGERIES      WISDOM TOOTH EXTRACTION  age 23     No Known Allergies    Meds:   Current Outpatient Medications:     acetaminophen (TYLENOL) 325 mg tablet, Take 2 tablets (650 mg total) by mouth every 4 (four) hours as needed for mild pain, moderate pain, severe pain or fever, Disp: 30 tablet, Rfl: 0    hydrocortisone 1 % cream, Apply 1 application topically as needed for irritation (Patient not taking: Reported on 8/9/2019), Disp: 30 g, Rfl: 0    ibuprofen (MOTRIN) 600 mg tablet, Take 1 tablet (600 mg total) by mouth every 6 (six) hours as needed for mild pain (Patient not taking: Reported on 8/9/2019), Disp: 30 tablet, Rfl: 0    Prenatal Vit-Fe Fumarate-FA (PRENATAL VITAMIN PO), Take by mouth, Disp: , Rfl:     PROAIR  (90 Base) MCG/ACT inhaler, inhale 1 puff by mouth every 6 hours if needed, Disp: , Rfl: 0    witch hazel-glycerin (TUCKS) topical pad, Apply 1 pad topically as needed for irritation (Patient not taking: Reported on 8/9/2019), Disp: , Rfl: 0    Vitals: currently breastfeeding  ,There is no height or weight on file to calculate BMI        Review of Systems:  CONSTITUTIONALno weight loss, fever, night sweats and feels well  CVS:; pertinent negatives - chest pain, chest pressure/discomfort, dyspnea, irregular heart beat, lower extremity edema and palpitations  RESP:no cough, shortness of breath, or wheezing  GI:Normal BM's, denies hematochezia, melena or pain  :Denies: dysuria, frequency/urgency, hematuria, heavy menses, pelvic pain  VAGINAL BLEEDING:staining only    PHYSICAL EXAM:    GEN: Colin Mayers appears well, alert and oriented x 3, pleasant and cooperative   NECK: supple, no thyromegaly  LUNGS: normal respiratory effort   ABDOMEN: soft, nondistended  EXTREMITIES: no edema  NEURO: no focal findings   BREASTS: bilaterally no masses, no nipple discharge, no skin changes, warm and soft bilaterally  GYN: external genitalia wnl, no lesions, no rashes, no erythema            Uterus: normal size, nontender, mobile            Cervix closed, no discharge            Adnexa: nontender, nonpalpable bilaterally                  Assessment: Normal Postpartum Exam  Plan:  Contraceptive method of choice-depo  Return for annual 3 months    zoloft to the pharmacy

## 2019-09-23 ENCOUNTER — TELEPHONE (OUTPATIENT)
Dept: OBGYN CLINIC | Facility: CLINIC | Age: 29
End: 2019-09-23

## 2019-09-23 DIAGNOSIS — B37.9 CANDIDIASIS: Primary | ICD-10-CM

## 2019-09-23 RX ORDER — NYSTATIN 100000 U/G
OINTMENT TOPICAL 2 TIMES DAILY
Qty: 45 G | Refills: 0 | Status: SHIPPED | OUTPATIENT
Start: 2019-09-23 | End: 2020-06-08 | Stop reason: ALTCHOICE

## 2019-09-23 NOTE — TELEPHONE ENCOUNTER
Pls advise pt I escribed a cream but to wash breast area with soap and water prior to breastfeeding   Thx

## 2019-09-23 NOTE — TELEPHONE ENCOUNTER
Called pt- advised of [facility] rx for breast/nipple cream &  recommendation to wash breast area with soap & water prior to breastfeeding  Pt verbalized understanding

## 2019-09-23 NOTE — TELEPHONE ENCOUNTER
pts ppar was 8/2019 with Chelsea,  The baby has thrush & pt would like a call to discuss what can be called in for her,   thanks

## 2019-09-23 NOTE — TELEPHONE ENCOUNTER
I returned pt call  Pt stated the baby physician advised her that she can call GYN to get a cream that she can place on her breast since the baby has thrush  Pt confirmed pharmacy on file

## 2019-12-16 ENCOUNTER — TELEPHONE (OUTPATIENT)
Dept: OBGYN CLINIC | Facility: CLINIC | Age: 29
End: 2019-12-16

## 2019-12-16 DIAGNOSIS — Z30.013 ENCOUNTER FOR INITIAL PRESCRIPTION OF INJECTABLE CONTRACEPTIVE: Primary | ICD-10-CM

## 2019-12-16 RX ORDER — MEDROXYPROGESTERONE ACETATE 150 MG/ML
150 INJECTION, SUSPENSION INTRAMUSCULAR
Qty: 1 ML | Refills: 2 | Status: SHIPPED | OUTPATIENT
Start: 2019-12-16 | End: 2019-12-18 | Stop reason: SDUPTHER

## 2019-12-16 NOTE — TELEPHONE ENCOUNTER
Pt needs refills on depo provera- she missed her appt today ; send to [facility] on file  She rescheduled for 12/18 in [place]

## 2019-12-18 ENCOUNTER — CLINICAL SUPPORT (OUTPATIENT)
Dept: OBGYN CLINIC | Age: 29
End: 2019-12-18
Payer: COMMERCIAL

## 2019-12-18 VITALS — BODY MASS INDEX: 27.47 KG/M2 | SYSTOLIC BLOOD PRESSURE: 120 MMHG | DIASTOLIC BLOOD PRESSURE: 74 MMHG | WEIGHT: 186 LBS

## 2019-12-18 DIAGNOSIS — Z30.013 INITIATION OF DEPO PROVERA: Primary | ICD-10-CM

## 2019-12-18 LAB — SL AMB POCT URINE HCG: NORMAL

## 2019-12-18 PROCEDURE — 96372 THER/PROPH/DIAG INJ SC/IM: CPT | Performed by: NURSE PRACTITIONER

## 2019-12-18 RX ORDER — MEDROXYPROGESTERONE ACETATE 150 MG/ML
150 INJECTION, SUSPENSION INTRAMUSCULAR ONCE
Status: COMPLETED | OUTPATIENT
Start: 2019-12-18 | End: 2019-12-18

## 2019-12-18 RX ADMIN — MEDROXYPROGESTERONE ACETATE 150 MG: 150 INJECTION, SUSPENSION INTRAMUSCULAR at 09:18

## 2019-12-18 NOTE — PROGRESS NOTES
Pt scheduled for depo, last injection given 8/27/2019 in right deltoid, overdue for injection by 3 weeks  Pt will be restarting injection  Pt advised about injection time frames for depo provera and need for yearly exam  UPT negative today, pt denies intercourse for the past 2+ weeks  Pt given depo provera IM in left buttock, tolerated well   Schedule next injection and yearly exam

## 2020-02-10 ENCOUNTER — OFFICE VISIT (OUTPATIENT)
Dept: FAMILY MEDICINE CLINIC | Facility: CLINIC | Age: 30
End: 2020-02-10
Payer: COMMERCIAL

## 2020-02-10 VITALS
OXYGEN SATURATION: 100 % | WEIGHT: 200 LBS | DIASTOLIC BLOOD PRESSURE: 64 MMHG | BODY MASS INDEX: 29.62 KG/M2 | HEIGHT: 69 IN | SYSTOLIC BLOOD PRESSURE: 102 MMHG | TEMPERATURE: 97.2 F | HEART RATE: 99 BPM

## 2020-02-10 DIAGNOSIS — Z00.00 ANNUAL PHYSICAL EXAM: Primary | ICD-10-CM

## 2020-02-10 DIAGNOSIS — F32.1 CURRENT MODERATE EPISODE OF MAJOR DEPRESSIVE DISORDER WITHOUT PRIOR EPISODE (HCC): ICD-10-CM

## 2020-02-10 PROBLEM — O28.8 HIGH RISK PREGNANCY WITH HIGH INHIBIN: Status: RESOLVED | Noted: 2019-04-04 | Resolved: 2020-02-10

## 2020-02-10 PROBLEM — Z34.80 SUPERVISION OF OTHER NORMAL PREGNANCY, ANTEPARTUM: Status: RESOLVED | Noted: 2019-05-14 | Resolved: 2020-02-10

## 2020-02-10 PROBLEM — R12 HEARTBURN SYMPTOM: Chronic | Status: RESOLVED | Noted: 2018-02-12 | Resolved: 2020-02-10

## 2020-02-10 PROBLEM — O09.899 HIGH RISK PREGNANCY WITH HIGH INHIBIN: Status: RESOLVED | Noted: 2019-04-04 | Resolved: 2020-02-10

## 2020-02-10 PROCEDURE — 99395 PREV VISIT EST AGE 18-39: CPT | Performed by: PHYSICIAN ASSISTANT

## 2020-02-10 NOTE — PROGRESS NOTES
[address]     NAME: Kiersten Guillory  AGE: 34 y o  SEX: female  : 1990     DATE: 2/10/2020     Assessment and Plan:     Problem List Items Addressed This Visit        Other    Current moderate episode of major depressive disorder without prior episode ([facility] [address] )    Relevant Orders    Comprehensive metabolic panel      Other Visit Diagnoses     Annual physical exam    -  Primary          Immunizations and preventive care screenings were discussed with patient today  Appropriate education was printed on patient's after visit summary  Counseling:  Dental Health: discussed importance of regular tooth brushing, flossing, and dental visits  Injury prevention: discussed safety/seat belts, safety helmets, smoke detectors, carbon dioxide detectors, and smoking near bedding or upholstery  · Exercise: the importance of regular exercise/physical activity was discussed  Recommend exercise 3-5 times per week for at least 30 minutes  BMI Counseling: Body mass index is 29 53 kg/m²  The BMI is above normal  Nutrition recommendations include decreasing portion sizes, encouraging healthy choices of fruits and vegetables, consuming healthier snacks, limiting drinks that contain sugar, moderation in carbohydrate intake, increasing intake of lean protein, reducing intake of saturated and trans fat and reducing intake of cholesterol  Exercise recommendations include moderate physical activity 150 minutes/week and strength training exercises  No pharmacotherapy was ordered  Return in about 6 months (around 8/10/2020)  Chief Complaint:     Chief Complaint   Patient presents with    Physical Exam     annual, no complaint of pain    Follow-up     six month  with medication check    Flu Vaccine     patient is requesting    constipatition     for over one week  patient stated stool is hard when having bowel movements      History of Present Illness:     Adult Annual Physical   Patient here for a comprehensive physical exam  The patient reports Constipation  Diet and Physical Activity  · Diet/Nutrition: poor diet  · Exercise: walking  Depression Screening  PHQ-9 Depression Screening    PHQ-9:    Frequency of the following problems over the past two weeks:       Little interest or pleasure in doing things:  0 - not at all  Feeling down, depressed, or hopeless:  0 - not at all  Trouble falling or staying asleep, or sleeping too much:  0 - not at all  Feeling tired or having little energy:  1 - several days  Poor appetite or overeatin - several days  Feeling bad about yourself - or that you are a failure or have let yourself or your family down:  0 - not at all  Trouble concentrating on things, such as reading the newspaper or watching television:  0 - not at all  Moving or speaking so slowly that other people could have noticed  Or the opposite - being so fidgety or restless that you have been moving around a lot more than usual:  0 - not at all  Thoughts that you would be better off dead, or of hurting yourself in some way:  0 - not at all  PHQ-2 Score:  0  PHQ-9 Score:  2       General Health  · Sleep: sleeps well  · Hearing: normal - bilateral   · Vision: most recent eye exam >1 year ago  · Dental: no dental visits for >1 year  /GYN Health  · Last menstrual period: 2020  · Contraceptive method: injectable contraception  · History of STDs?: no      Review of Systems:     Review of Systems   Constitutional: Negative for appetite change, fatigue, fever and unexpected weight change  HENT: Negative for dental problem, ear pain, hearing loss, mouth sores, nosebleeds, rhinorrhea, tinnitus, trouble swallowing and voice change  Eyes: Negative for photophobia, pain, discharge and visual disturbance  Respiratory: Negative for cough, chest tightness, shortness of breath and wheezing  Cardiovascular: Negative for chest pain and palpitations  Gastrointestinal: Positive for constipation  Negative for abdominal pain, blood in stool, diarrhea, nausea, rectal pain and vomiting  Endocrine: Negative for cold intolerance, polydipsia, polyphagia and polyuria  Genitourinary: Negative for decreased urine volume, difficulty urinating, dysuria, enuresis, frequency, genital sores, hematuria and urgency  Musculoskeletal: Negative for arthralgias, back pain, gait problem, joint swelling, myalgias, neck pain and neck stiffness  Skin: Negative for color change and rash  Allergic/Immunologic: Negative for environmental allergies, food allergies and immunocompromised state  Neurological: Negative for dizziness, seizures, speech difficulty, light-headedness and headaches  Hematological: Negative for adenopathy  Does not bruise/bleed easily  Psychiatric/Behavioral: Negative for behavioral problems, confusion, decreased concentration, self-injury and sleep disturbance  The patient is not nervous/anxious and is not hyperactive         Past Medical History:     Past Medical History:   Diagnosis Date    Asthma     childhood asthma  inhaler prn    Depression     diag with depression/anxiety age 27 -no meds now    Migraine     diag 2 yrs ago- takes extra-strength tylenol    Termination of pregnancy (fetus)     Urinary tract infection     hx of  last episode 2018    Varicella     received vaccines      Past Surgical History:     Past Surgical History:   Procedure Laterality Date    NO PAST SURGERIES      WISDOM TOOTH EXTRACTION  age 23      Social History:     Social History     Socioeconomic History    Marital status: Single     Spouse name: None    Number of children: None    Years of education: None    Highest education level: None   Occupational History    None   Social Needs    Financial resource strain: None    Food insecurity:     Worry: None     Inability: None    Transportation needs: Medical: None     Non-medical: None   Tobacco Use    Smoking status: Former Smoker     Packs/day: 0 50     Years: 10 00     Pack years: 5 00     Types: Cigarettes     Last attempt to quit: 10/1/2018     Years since quittin 3    Smokeless tobacco: Never Used    Tobacco comment: smokes 1 cigarette per dAY!    Substance and Sexual Activity    Alcohol use: Yes     Comment: occasional-none since pregnancy    Drug use: Yes     Types: Marijuana     Comment: Lst used 2018    Sexual activity: Yes     Partners: Male     Birth control/protection: Injection   Lifestyle    Physical activity:     Days per week: None     Minutes per session: None    Stress: None   Relationships    Social connections:     Talks on phone: None     Gets together: None     Attends Buddhist service: None     Active member of club or organization: None     Attends meetings of clubs or organizations: None     Relationship status: None    Intimate partner violence:     Fear of current or ex partner: None     Emotionally abused: None     Physically abused: None     Forced sexual activity: None   Other Topics Concern    None   Social History Narrative    None      Family History:     Family History   Problem Relation Age of Onset    Asthma Mother     Bipolar disorder Mother     Post-traumatic stress disorder Mother     Depression Sister     Drug abuse Sister     Cancer Maternal Grandfather     Depression Maternal Uncle     Anxiety disorder Maternal Uncle     Diabetes Paternal Aunt     Breast cancer Family     Anxiety disorder Father     Colon cancer Neg Hx     Ovarian cancer Neg Hx     Uterine cancer Neg Hx     Cervical cancer Neg Hx       Current Medications:     Current Outpatient Medications   Medication Sig Dispense Refill    medroxyPROGESTERone (DEPO-PROVERA) 150 mg/mL injection Inject 1 mL (150 mg total) into a muscle every 3 (three) months 1 mL 0    PROAIR  (90 Base) MCG/ACT inhaler inhale 1 puff by mouth every 6 hours if needed  0    sertraline (ZOLOFT) 50 mg tablet take 1 tablet by mouth once daily 30 tablet 1    nystatin (MYCOSTATIN) ointment Apply topically 2 (two) times a day for 14 days Pls advise pt to cleanse breast with soap and water prior to breastfeeding  45 g 0    Prenatal Vit-Fe Fumarate-FA (PRENATAL VITAMIN PO) Take by mouth       No current facility-administered medications for this visit  Allergies:     No Known Allergies   Physical Exam:     /64   Pulse 99   Temp (!) 97 2 °F (36 2 °C)   Ht 5' 9" (1 753 m)   Wt 90 7 kg (200 lb)   SpO2 100%   BMI 29 53 kg/m²     Physical Exam   Constitutional: She is oriented to person, place, and time  She appears well-developed and well-nourished  HENT:   Head: Normocephalic  Right Ear: Hearing, tympanic membrane, external ear and ear canal normal    Left Ear: Hearing, tympanic membrane, external ear and ear canal normal    Nose: Nose normal    Mouth/Throat: Uvula is midline, oropharynx is clear and moist and mucous membranes are normal    Eyes: Pupils are equal, round, and reactive to light  Conjunctivae and EOM are normal    Neck: Normal range of motion  No thyromegaly present  Cardiovascular: Normal rate, regular rhythm, normal heart sounds and intact distal pulses  Pulmonary/Chest: Effort normal and breath sounds normal    Abdominal: Soft  Bowel sounds are normal  She exhibits no distension and no mass  There is no hepatosplenomegaly  There is no tenderness  There is no CVA tenderness  Musculoskeletal: Normal range of motion  She exhibits no edema or tenderness  Lymphadenopathy:     She has no cervical adenopathy  Neurological: She is alert and oriented to person, place, and time  She has normal reflexes  She exhibits normal muscle tone  Coordination normal    Skin: Skin is dry  No rash noted  Psychiatric: She has a normal mood and affect   Her behavior is normal  Judgment and thought content normal    Nursing note and vitals reviewed        DAVIS Anderson [phone] Darron Huggins

## 2020-02-10 NOTE — PATIENT INSTRUCTIONS
Wellness Visit for Adults   AMBULATORY CARE:   A wellness visit  is when you see your healthcare provider to get screened for health problems  You can also get advice on how to stay healthy  Write down your questions so you remember to ask them  Ask your healthcare provider how often you should have a wellness visit  What happens at a wellness visit:  Your healthcare provider will ask about your health, and your family history of health problems  This includes high blood pressure, heart disease, and cancer  He or she will ask if you have symptoms that concern you, if you smoke, and about your mood  You may also be asked about your intake of medicines, supplements, food, and alcohol  Any of the following may be done:  · Your weight  will be checked  Your height may also be checked so your body mass index (BMI) can be calculated  Your BMI shows if you are at a healthy weight  · Your blood pressure  and heart rate will be checked  Your temperature may also be checked  · Blood and urine tests  may be done  Blood tests may be done to check your cholesterol levels  Abnormal cholesterol levels increase your risk for heart disease and stroke  You may also need a blood or urine test to check for diabetes if you are at increased risk  Urine tests may be done to look for signs of an infection or kidney disease  · A physical exam  includes checking your heartbeat and lungs with a stethoscope  Your healthcare provider may also check your skin to look for sun damage  · Screening tests  may be recommended  A screening test is done to check for diseases that may not cause symptoms  The screening tests you may need depend on your age, gender, family history, and lifestyle habits  For example, colorectal screening may be recommended if you are 48years old or older  Screening tests you need if you are a woman:   · A Pap smear  is used to screen for cervical cancer   Pap smears are usually done every 3 to 5 years depending on your age  You may need them more often if you have had abnormal Pap smear test results in the past  Ask your healthcare provider how often you should have a Pap smear  · A mammogram  is an x-ray of your breasts to screen for breast cancer  Experts recommend mammograms every 2 years starting at age 48 years  You may need a mammogram at age 52 years or younger if you have an increased risk for breast cancer  Talk to your healthcare provider about when you should start having mammograms and how often you need them  Vaccines you may need:   · Get an influenza vaccine  every year  The influenza vaccine protects you from the flu  Several types of viruses cause the flu  The viruses change over time, so new vaccines are made each year  · Get a tetanus-diphtheria (Td) booster vaccine  every 10 years  This vaccine protects you against tetanus and diphtheria  Tetanus is a severe infection that may cause painful muscle spasms and lockjaw  Diphtheria is a severe bacterial infection that causes a thick covering in the back of your mouth and throat  · Get a human papillomavirus (HPV) vaccine  if you are female and aged 23 to 32 or male 23 to 24 and never received it  This vaccine protects you from HPV infection  HPV is the most common infection spread by sexual contact  HPV may also cause vaginal, penile, and anal cancers  · Get a pneumococcal vaccine  if you are aged 72 years or older  The pneumococcal vaccine is an injection given to protect you from pneumococcal disease  Pneumococcal disease is an infection caused by pneumococcal bacteria  The infection may cause pneumonia, meningitis, or an ear infection  · Get a shingles vaccine  if you are aged 61 or older, even if you have had shingles before  The shingles vaccine is an injection to protect you from the varicella-zoster virus  This is the same virus that causes chickenpox   Shingles is a painful rash that develops in people who had chickenpox or have been exposed to the virus  How to eat healthy:  My Plate is a model for planning healthy meals  It shows the types and amounts of foods that should go on your plate  Fruits and vegetables make up about half of your plate, and grains and protein make up the other half  A serving of dairy is included on the side of your plate  The amount of calories and serving sizes you need depends on your age, gender, weight, and height  Examples of healthy foods are listed below:  · Eat a variety of vegetables  such as dark green, red, and orange vegetables  You can also include canned vegetables low in sodium (salt) and frozen vegetables without added butter or sauces  · Eat a variety of fresh fruits , canned fruit in 100% juice, frozen fruit, and dried fruit  · Include whole grains  At least half of the grains you eat should be whole grains  Examples include whole-wheat bread, wheat pasta, brown rice, and whole-grain cereals such as oatmeal     · Eat a variety of protein foods such as seafood (fish and shellfish), lean meat, and poultry without skin (turkey and chicken)  Examples of lean meats include pork leg, shoulder, or tenderloin, and beef round, sirloin, tenderloin, and extra lean ground beef  Other protein foods include eggs and egg substitutes, beans, peas, soy products, nuts, and seeds  · Choose low-fat dairy products such as skim or 1% milk or low-fat yogurt, cheese, and cottage cheese  · Limit unhealthy fats  such as butter, hard margarine, and shortening  Exercise:  Exercise at least 30 minutes per day on most days of the week  Some examples of exercise include walking, biking, dancing, and swimming  You can also fit in more physical activity by taking the stairs instead of the elevator or parking farther away from stores  Include muscle strengthening activities 2 days each week  Regular exercise provides many health benefits   It helps you manage your weight, and decreases your risk for type 2 diabetes, heart disease, stroke, and high blood pressure  Exercise can also help improve your mood  Ask your healthcare provider about the best exercise plan for you  General health and safety guidelines:   · Do not smoke  Nicotine and other chemicals in cigarettes and cigars can cause lung damage  Ask your healthcare provider for information if you currently smoke and need help to quit  E-cigarettes or smokeless tobacco still contain nicotine  Talk to your healthcare provider before you use these products  · Limit alcohol  A drink of alcohol is 12 ounces of beer, 5 ounces of wine, or 1½ ounces of liquor  · Lose weight, if needed  Being overweight increases your risk of certain health conditions  These include heart disease, high blood pressure, type 2 diabetes, and certain types of cancer  · Protect your skin  Do not sunbathe or use tanning beds  Use sunscreen with a SPF 15 or higher  Apply sunscreen at least 15 minutes before you go outside  Reapply sunscreen every 2 hours  Wear protective clothing, hats, and sunglasses when you are outside  · Drive safely  Always wear your seatbelt  Make sure everyone in your car wears a seatbelt  A seatbelt can save your life if you are in an accident  Do not use your cell phone when you are driving  This could distract you and cause an accident  Pull over if you need to make a call or send a text message  · Practice safe sex  Use latex condoms if are sexually active and have more than one partner  Your healthcare provider may recommend screening tests for sexually transmitted infections (STIs)  · Wear helmets, lifejackets, and protective gear  Always wear a helmet when you ride a bike or motorcycle, go skiing, or play sports that could cause a head injury  Wear protective equipment when you play sports  Wear a lifejacket when you are on a boat or doing water sports    © 2017 [address] [employer] Information is for End User's use only and may not be sold, redistributed or otherwise used for commercial purposes  All illustrations and images included in CareNotes® are the copyrighted property of [employer]  or [employer]  The above information is an  only  It is not intended as medical advice for individual conditions or treatments  Talk to your doctor, nurse or pharmacist before following any medical regimen to see if it is safe and effective for you  Cholesterol and Your Health   AMBULATORY CARE:   Cholesterol  is a waxy, fat-like substance  Cholesterol is made by your body, but also comes from certain foods you eat  Your body uses cholesterol to make hormones and new cells  Your body also uses cholesterol to protect nerves  Cholesterol comes from foods such as meat and dairy products  Your total cholesterol level is made up by LDL cholesterol, HDL cholesterol, and triglycerides:  · LDL cholesterol  is called bad cholesterol  because it forms plaque in your arteries  As plaque builds up, your arteries become narrow, and less blood flows through  When plaque decreases blood flow to your heart, you may have chest pain  If plaque completely blocks an artery that bring blood to your heart, you may have a heart attack  Plaque can break off and form blood clots  Blood clots may block arteries in your brain and cause a stroke  · HDL cholesterol  is called good cholesterol  because it helps remove LDL cholesterol from your arteries  It does this by attaching to LDL cholesterol and carrying it to your liver  Your liver breaks down LDL cholesterol so your body can get rid of it  High levels of HDL cholesterol can help prevent a heart attack and stroke  Low levels of HDL cholesterol can increase your risk for heart disease, heart attack, and stroke  · Triglycerides  are a type of fat that store energy from foods you eat  High levels of triglycerides also cause plaque buildup   This can increase your risk for a heart attack or stroke  If your triglyceride level is high, your LDL cholesterol level may also be high  How food affects your cholesterol levels:   · Unhealthy fats  increase LDL cholesterol and triglyceride levels in your blood  They are found in foods high in cholesterol, saturated fat, and trans fat:     ¨ Cholesterol  is found in eggs, dairy, and meat  ¨ Saturated fat  is found in butter, cheese, ice cream, whole milk, and coconut oil  Saturated fat is also found in meat, such as sausage, hot dogs, and bologna  ¨ Trans fat  is found in liquid oils and is used in fried and baked foods  Foods that contain trans fats include chips, crackers, muffins, sweet rolls, microwave popcorn, and cookies  · Healthy fats,  also called unsaturated fats, help lower LDL cholesterol and triglyceride levels  Healthy fats include the following:     ¨ Monounsaturated fats  are found in foods such as olive oil, canola oil, avocado, nuts, and olives  ¨ Polyunsaturated fats,  such as omega 3 fats, are found in fish, such as salmon, trout, and tuna  They can also be found in plant foods such as flaxseed, walnuts, and soybeans  Other things that affect your cholesterol levels:   · Smoking cigarettes    · Being overweight or obese     · Drinking large amounts of alcohol    · Not enough exercise or no exercise    · Certain genes passed from your parents to you  What you need to know about having your cholesterol levels checked: Adults 21to 39years of age should have their cholesterol levels checked every 4 to 6 years  Adults 45 years and older should have their cholesterol checked every 1 to 2 years  You may need your cholesterol checked more often, or at a younger age, if you have risk factors for heart disease  You may also need to have your cholesterol checked more often if you have other health conditions, such as diabetes  Blood tests are used to check cholesterol levels   Blood tests measure your levels of triglycerides, LDL cholesterol, and HDL cholesterol  Cholesterol level goals: Your cholesterol level goal may depend on your risk for heart disease  It may also depend on your age and other health conditions  Ask your healthcare provider if the following goals are right for you:  · Your total cholesterol level  should be less than 200 mg/dL  This number may also depend on your HDL and LDL cholesterol goals  · Your LDL cholesterol level  should be less than 130 mg/dL  · Your HDL cholesterol level  should be 60 mg/dL or higher  · Your triglyceride level  should be less than 150 mg/dL  Treatment for high cholesterol:  Treatment for high cholesterol will also decrease your risk of heart disease, heart attack, and stroke  Treatment may include any of the following:  · Medicines  may be given to lower your LDL cholesterol, triglyceride levels, or total cholesterol level  You may need medicines to lower your cholesterol if any of the following is true:     ¨ You have a history of stroke, TIA, unstable angina, or a heart attack    ¨ Your LDL cholesterol level is 190 mg/dL or higher    ¨ You are age 36to 76years of age, have diabetes, and your LDL cholesterol is 70 mg/dL or higher    ¨ You are age 36to 76years of age, have risk factors for heart disease, and your LDL cholesterol is 70 mg/dL or higher    · Lifestyle changes  include changes to your diet, exercise, weight loss, and quitting smoking  It also includes decreasing the amount of alcohol you drink  · Supplements  include fish oil, red yeast rice, and garlic  Fish oil may help lower your triglyceride and LDL cholesterol levels  It may also increase your HDL cholesterol level  Red yeast rice may help decrease your total cholesterol level and LDL cholesterol level  Garlic may help lower your total cholesterol level  Do not take these supplements without talking to your healthcare provider     Nutrition to help lower your cholesterol levels:  A registered dietitian can help you create a healthy eating plan  Read food labels and choose foods low in saturated fat, trans fats, and cholesterol  · Decrease the total amount of fat you eat  Choose lean meats, fat-free or 1% fat milk, and low-fat dairy products, such as yogurt and cheese  Try to limit or avoid red meats  Limit or do not eat fried foods or baked goods such as cookies  · Replace unhealthy fats with healthy fats  Cook foods in olive oil or canola oil  Choose soft margarines that are low in saturated fat and trans fat  Seeds, nuts, and avocados are other examples of healthy fats  · Eat foods with omega-3 fats  Examples include salmon, tuna, mackerel, walnuts, and flaxseed  Eat fish 2 times per week  Children and pregnant women should not eat fish that have high levels of mercury, such as shark, swordfish, and chele mackerel  · Increase the amount of plant-based foods you eat  Plant-based foods are low in cholesterol and fat  Eating more of these foods may help lower your cholesterol and help you lose weight  Examples of plant-based foods includes fruits, vegetables, legumes, and whole grains  Replace milk that contains dairy with almond, soy, or coconut milk  Eat beans and foods with soy for protein instead of meat  Ask your healthcare provider or dietitian for more information on plant-based foods  · Increase the amount of fiber you eat  High-fiber foods can help lower your LDL cholesterol  You should eat between 20 and 30 grams of fiber each day  Eat at least 5 servings of fruits and vegetables each day  Other examples of high-fiber foods include whole-grain or whole-wheat breads, pastas, or cereals, and brown rice  Eat 3 ounces of whole-grain foods each day  Increase fiber slowly  You may have abdominal discomfort, bloating, and gas if you add fiber to your diet too quickly  Lifestyle changes you can make to help lower your cholesterol levels:   · Maintain a healthy weight  Ask your healthcare provider how much you should weigh  Ask him or her to help you create a weight loss plan if you are overweight  Weight loss can decrease your total cholesterol and triglyceride levels  · Exercise regularly  Exercise can help lower your total cholesterol level and maintain a healthy weight  Exercise can also help increase your HDL cholesterol level  Work with your healthcare provider to create an exercise program that is right for you  Get at least 30 minutes of moderate exercise most days of the week  Examples of exercise include brisk walking, swimming, or biking  · Do not smoke  Nicotine and other chemicals in cigarettes and cigars can damage your lungs, heart, and blood vessels  They can also raise your triglyceride levels  Ask your healthcare provider for information if you currently smoke and need help to quit  E-cigarettes or smokeless tobacco still contain nicotine  Talk to your healthcare provider before you use these products  · Limit or do not drink alcohol  Alcohol can increase your triglyceride levels  Ask your healthcare provider if it is safe for you to drink alcohol  Also ask how much is safe for you to drink each day  © 2017 [facility] [facility] Information is for End User's use only and may not be sold, redistributed or otherwise used for commercial purposes  All illustrations and images included in CareNotes® are the copyrighted property of [employer]  or [employer]  The above information is an  only  It is not intended as medical advice for individual conditions or treatments  Talk to your doctor, nurse or pharmacist before following any medical regimen to see if it is safe and effective for you

## 2020-03-18 ENCOUNTER — ANNUAL EXAM (OUTPATIENT)
Dept: OBGYN CLINIC | Facility: CLINIC | Age: 30
End: 2020-03-18
Payer: COMMERCIAL

## 2020-03-18 VITALS — SYSTOLIC BLOOD PRESSURE: 126 MMHG | WEIGHT: 100 LBS | DIASTOLIC BLOOD PRESSURE: 78 MMHG | BODY MASS INDEX: 14.77 KG/M2

## 2020-03-18 DIAGNOSIS — Z30.42 ENCOUNTER FOR DEPO-PROVERA CONTRACEPTION: Primary | ICD-10-CM

## 2020-03-18 PROCEDURE — 96372 THER/PROPH/DIAG INJ SC/IM: CPT | Performed by: NURSE PRACTITIONER

## 2020-03-18 RX ORDER — MEDROXYPROGESTERONE ACETATE 150 MG/ML
150 INJECTION, SUSPENSION INTRAMUSCULAR ONCE
Status: COMPLETED | OUTPATIENT
Start: 2020-03-18 | End: 2020-03-18

## 2020-03-18 RX ADMIN — MEDROXYPROGESTERONE ACETATE 150 MG: 150 INJECTION, SUSPENSION INTRAMUSCULAR at 15:48

## 2020-03-18 NOTE — PROGRESS NOTES
Patient is here for depo injection, administered on right gluteus patient tolerated well  Noreen Andrews 49 [id number]  Lot: [id number]  Exp:10/21  Next due 6/3-17    Patient did indicate she was diagnosed with Bronchitis proper protection was used by both patient and myself

## 2020-03-18 NOTE — PATIENT INSTRUCTIONS
Medroxyprogesterone (By injection)   Medroxyprogesterone ([id number]-ln-[id number]-ter-one)  Prevents pregnancy  Also treats endometriosis and is used with other medicines to help relieve symptoms of cancer, including uterine or kidney cancer  Brand Name(s): Depo-Provera, Depo-Provera Contraceptive, Depo-SubQ Provera 104   There may be other brand names for this medicine  When This Medicine Should Not Be Used: This medicine is not right for everyone  You should not receive it if you had an allergic reaction to medroxyprogesterone or if you have a history of breast cancer or blood clots (including heart attack or stroke)  In most cases, you should not use this medicine while you are pregnant  How to Use This Medicine:   Injectable  · A nurse or other health provider will give you this medicine  This medicine is given as a shot into a muscle or just under the skin  · Your exact treatment schedule depends on the reason you are using this medicine  You doctor will explain your personal schedule  ¨ For treatment of cancer symptoms, you may start with a shot once per week  You may need fewer shots as your treatment goes forward  ¨ For birth control or endometriosis, you will need a shot every 3 months (13 weeks)  Read and follow the patient instructions that come with this medicine  Talk to your doctor or pharmacist if you have any questions  ¨ You might need to have the first shot during the first 5 days of your normal menstrual period, to make sure you are not pregnant  If you have just had a baby, you may receive a shot 5 days after birth if you are not breastfeeding or 6 weeks after birth if you are breastfeeding  · Missed dose: You must receive a shot every 3 months if you want to prevent pregnancy  Talk to your doctor or pharmacist if you do not receive your medicine on time, because you may need another form of birth control    Drugs and Foods to Avoid:   Ask your doctor or pharmacist before using any other medicine, including over-the-counter medicines, vitamins, and herbal products  · Some foods and medicines can affect how medroxyprogesterone works  Tell your doctor if you are using any of the following:  ¨ Aminoglutethimide, bosentan, carbamazepine, felbamate, griseofulvin, nefazodone, oxcarbazepine, phenobarbital, phenytoin, rifabutin, rifampin, rifapentine, Rosio's wort, topiramate  ¨ Medicine to treat an infection (including clarithromycin, itraconazole, ketoconazole, telithromycin, voriconazole)  ¨ Medicine to treat HIV/AIDS (including atazanavir, indinavir, nelfinavir, ritonavir, saquinavir)  Warnings While Using This Medicine:   · Tell your doctor right away if you think you have become pregnant  · Tell your doctor if you are breastfeeding or if you have liver disease, kidney disease, asthma, diabetes, heart disease, seizures, migraine headaches, an eating disorder, osteoporosis, or a history of depression  Tell your doctor if you smoke  · This medicine may cause the following problems:  ¨ Blood clots, which could lead to stroke, heart attack, or other serious problems  ¨ Possible increased risk of breast cancer  ¨ Weak or thin bones, especially with long-term use  · You should not use this medicine for long-term birth control unless you cannot use any other form of birth control  · This medicine will not protect you from HIV/AIDS or other sexually transmitted diseases  · Tell any doctor or dentist who treats you that you are using this medicine  This medicine may affect certain medical test results  · Your doctor will check your progress and the effects of this medicine at regular visits  Keep all appointments    Possible Side Effects While Using This Medicine:   Call your doctor right away if you notice any of these side effects:  · Allergic reaction: Itching or hives, swelling in your face or hands, swelling or tingling in your mouth or throat, chest tightness, trouble breathing  · Chest pain, trouble breathing, or coughing up blood  · Dark urine or pale stools, nausea, vomiting, loss of appetite, stomach pain, yellow skin or eyes  · Heavy or nonstop vaginal bleeding  · Loss of vision, double vision  · Numbness or weakness on one side of your body, sudden or severe headache, problems with vision, speech, or walking  · Severe stomach pain or cramps  If you notice these less serious side effects, talk with your doctor:   · Headache  · Light or missed monthly periods, spotting between periods  · Nervousness or dizziness  · Pain, redness, burning, swelling, or a lump under your skin where the shot was given  · Weight gain  If you notice other side effects that you think are caused by this medicine, tell your doctor  Call your doctor for medical advice about side effects  You may report side effects to FDA at 1-769-FDA-5181  © 2017 [address] [employer] Information is for End User's use only and may not be sold, redistributed or otherwise used for commercial purposes  The above information is an  only  It is not intended as medical advice for individual conditions or treatments  Talk to your doctor, nurse or pharmacist before following any medical regimen to see if it is safe and effective for you

## 2020-06-08 ENCOUNTER — ANNUAL EXAM (OUTPATIENT)
Dept: OBGYN CLINIC | Age: 30
End: 2020-06-08
Payer: COMMERCIAL

## 2020-06-08 VITALS
WEIGHT: 270.2 LBS | DIASTOLIC BLOOD PRESSURE: 96 MMHG | SYSTOLIC BLOOD PRESSURE: 118 MMHG | BODY MASS INDEX: 39.9 KG/M2 | TEMPERATURE: 98.4 F

## 2020-06-08 DIAGNOSIS — Z01.419 ENCOUNTER FOR ANNUAL ROUTINE GYNECOLOGICAL EXAMINATION: Primary | ICD-10-CM

## 2020-06-08 DIAGNOSIS — Z30.42 ENCOUNTER FOR DEPO-PROVERA CONTRACEPTION: ICD-10-CM

## 2020-06-08 PROCEDURE — 96372 THER/PROPH/DIAG INJ SC/IM: CPT | Performed by: NURSE PRACTITIONER

## 2020-06-08 PROCEDURE — S0612 ANNUAL GYNECOLOGICAL EXAMINA: HCPCS | Performed by: NURSE PRACTITIONER

## 2020-06-08 RX ORDER — MEDROXYPROGESTERONE ACETATE 150 MG/ML
150 INJECTION, SUSPENSION INTRAMUSCULAR
Qty: 1 ML | Refills: 3 | Status: SHIPPED | OUTPATIENT
Start: 2020-06-08 | End: 2020-08-24

## 2020-06-08 RX ORDER — MEDROXYPROGESTERONE ACETATE 150 MG/ML
150 INJECTION, SUSPENSION INTRAMUSCULAR ONCE
Status: COMPLETED | OUTPATIENT
Start: 2020-06-08 | End: 2020-06-08

## 2020-06-08 RX ADMIN — MEDROXYPROGESTERONE ACETATE 150 MG: 150 INJECTION, SUSPENSION INTRAMUSCULAR at 07:35

## 2020-08-24 DIAGNOSIS — Z30.42 ENCOUNTER FOR DEPO-PROVERA CONTRACEPTION: Primary | ICD-10-CM

## 2020-08-24 RX ORDER — MEDROXYPROGESTERONE ACETATE 150 MG/ML
INJECTION, SUSPENSION INTRAMUSCULAR
Qty: 1 ML | Refills: 3 | Status: SHIPPED | OUTPATIENT
Start: 2020-08-24 | End: 2022-05-20 | Stop reason: ALTCHOICE

## 2020-10-26 ENCOUNTER — APPOINTMENT (EMERGENCY)
Dept: RADIOLOGY | Facility: HOSPITAL | Age: 30
End: 2020-10-26
Payer: OTHER MISCELLANEOUS

## 2020-10-26 ENCOUNTER — HOSPITAL ENCOUNTER (EMERGENCY)
Facility: HOSPITAL | Age: 30
Discharge: HOME | End: 2020-10-26
Attending: EMERGENCY MEDICINE
Payer: OTHER MISCELLANEOUS

## 2020-10-26 VITALS
WEIGHT: 209 LBS | RESPIRATION RATE: 18 BRPM | SYSTOLIC BLOOD PRESSURE: 160 MMHG | HEIGHT: 69 IN | DIASTOLIC BLOOD PRESSURE: 74 MMHG | HEART RATE: 95 BPM | TEMPERATURE: 97.8 F | OXYGEN SATURATION: 100 % | BODY MASS INDEX: 30.96 KG/M2

## 2020-10-26 DIAGNOSIS — S92.501A CLOSED FRACTURE OF PHALANX OF RIGHT FIFTH TOE, INITIAL ENCOUNTER: Primary | ICD-10-CM

## 2020-10-26 PROCEDURE — 99284 EMERGENCY DEPT VISIT MOD MDM: CPT | Performed by: PHYSICIAN ASSISTANT

## 2020-10-26 PROCEDURE — 99283 EMERGENCY DEPT VISIT LOW MDM: CPT

## 2020-10-26 PROCEDURE — 73630 X-RAY EXAM OF FOOT: CPT

## 2020-10-26 RX ORDER — IBUPROFEN 600 MG/1
600 TABLET ORAL EVERY 6 HOURS PRN
Qty: 30 TABLET | Refills: 0 | Status: SHIPPED | OUTPATIENT
Start: 2020-10-26 | End: 2022-05-20 | Stop reason: ALTCHOICE

## 2020-10-26 RX ORDER — LIDOCAINE HYDROCHLORIDE 10 MG/ML
10 INJECTION, SOLUTION EPIDURAL; INFILTRATION; INTRACAUDAL; PERINEURAL ONCE
Status: COMPLETED | OUTPATIENT
Start: 2020-10-26 | End: 2020-10-26

## 2020-10-26 RX ORDER — SENNOSIDES 8.6 MG
650 CAPSULE ORAL EVERY 8 HOURS PRN
Qty: 30 TABLET | Refills: 0 | Status: SHIPPED | OUTPATIENT
Start: 2020-10-26 | End: 2022-05-20 | Stop reason: ALTCHOICE

## 2020-10-26 RX ADMIN — LIDOCAINE HYDROCHLORIDE 10 ML: 10 INJECTION, SOLUTION EPIDURAL; INFILTRATION; INTRACAUDAL; PERINEURAL at 13:59

## 2021-04-26 ENCOUNTER — TELEPHONE (OUTPATIENT)
Dept: FAMILY MEDICINE CLINIC | Facility: CLINIC | Age: 31
End: 2021-04-26

## 2022-05-20 ENCOUNTER — OFFICE VISIT (OUTPATIENT)
Dept: FAMILY MEDICINE CLINIC | Facility: CLINIC | Age: 32
End: 2022-05-20
Payer: COMMERCIAL

## 2022-05-20 VITALS
HEIGHT: 69 IN | DIASTOLIC BLOOD PRESSURE: 70 MMHG | TEMPERATURE: 98.3 F | BODY MASS INDEX: 26.81 KG/M2 | WEIGHT: 181 LBS | OXYGEN SATURATION: 99 % | SYSTOLIC BLOOD PRESSURE: 104 MMHG | HEART RATE: 81 BPM

## 2022-05-20 DIAGNOSIS — F84.0 HIGH-FUNCTIONING AUTISM SPECTRUM DISORDER: ICD-10-CM

## 2022-05-20 DIAGNOSIS — I95.1 ORTHOSTATIC HYPOTENSION: ICD-10-CM

## 2022-05-20 DIAGNOSIS — J45.20 MILD INTERMITTENT ASTHMA WITHOUT COMPLICATION: ICD-10-CM

## 2022-05-20 DIAGNOSIS — I73.00 RAYNAUD'S PHENOMENON WITHOUT GANGRENE: ICD-10-CM

## 2022-05-20 DIAGNOSIS — Z00.00 ANNUAL PHYSICAL EXAM: Primary | ICD-10-CM

## 2022-05-20 DIAGNOSIS — R51.9 FREQUENT HEADACHES: ICD-10-CM

## 2022-05-20 PROCEDURE — 3725F SCREEN DEPRESSION PERFORMED: CPT | Performed by: PHYSICIAN ASSISTANT

## 2022-05-20 PROCEDURE — 3008F BODY MASS INDEX DOCD: CPT | Performed by: PHYSICIAN ASSISTANT

## 2022-05-20 PROCEDURE — 99395 PREV VISIT EST AGE 18-39: CPT | Performed by: PHYSICIAN ASSISTANT

## 2022-05-20 RX ORDER — SUMATRIPTAN 50 MG/1
50 TABLET, FILM COATED ORAL ONCE AS NEEDED
Qty: 9 TABLET | Refills: 0 | Status: SHIPPED | OUTPATIENT
Start: 2022-05-20 | End: 2022-06-18

## 2022-05-20 NOTE — PATIENT INSTRUCTIONS
Wellness Visit for Adults   AMBULATORY CARE:   A wellness visit  is when you see your healthcare provider to get screened for health problems  Your healthcare provider will also give you advice on how to stay healthy  Write down your questions so you remember to ask them  Ask your healthcare provider how often you should have a wellness visit  What happens at a wellness visit:  Your healthcare provider will ask about your health, and your family history of health problems  This includes high blood pressure, heart disease, and cancer  He or she will ask if you have symptoms that concern you, if you smoke, and about your mood  You may also be asked about your intake of medicines, supplements, food, and alcohol  Any of the following may be done:  · Your weight  will be checked  Your height may also be checked so your body mass index (BMI) can be calculated  Your BMI shows if you are at a healthy weight  · Your blood pressure  and heart rate will be checked  Your temperature may also be checked  · Blood and urine tests  may be done  Blood tests may be done to check your cholesterol levels  Abnormal cholesterol levels increase your risk for heart disease and stroke  You may also need a blood or urine test to check for diabetes if you are at increased risk  Urine tests may be done to look for signs of an infection or kidney disease  · A physical exam  includes checking your heartbeat and lungs with a stethoscope  Your healthcare provider may also check your skin to look for sun damage  · Screening tests  may be recommended  A screening test is done to check for diseases that may not cause symptoms  The screening tests you may need depend on your age, gender, family history, and lifestyle habits  For example, colorectal screening may be recommended if you are 48years old or older  Screening tests you need if you are a woman:   · A Pap smear  is used to screen for cervical cancer   Pap smears are usually done every 3 to 5 years depending on your age  You may need them more often if you have had abnormal Pap smear test results in the past  Ask your healthcare provider how often you should have a Pap smear  · A mammogram  is an x-ray of your breasts to screen for breast cancer  Experts recommend mammograms every 2 years starting at age 48 years  You may need a mammogram at age 52 years or younger if you have an increased risk for breast cancer  Talk to your healthcare provider about when you should start having mammograms and how often you need them  Vaccines you may need:   · Get an influenza vaccine  every year  The influenza vaccine protects you from the flu  Several types of viruses cause the flu  The viruses change over time, so new vaccines are made each year  · Get a tetanus-diphtheria (Td) booster vaccine  every 10 years  This vaccine protects you against tetanus and diphtheria  Tetanus is a severe infection that may cause painful muscle spasms and lockjaw  Diphtheria is a severe bacterial infection that causes a thick covering in the back of your mouth and throat  · Get a human papillomavirus (HPV) vaccine  if you are female and aged 23 to 32 or male 23 to 24 and never received it  This vaccine protects you from HPV infection  HPV is the most common infection spread by sexual contact  HPV may also cause vaginal, penile, and anal cancers  · Get a pneumococcal vaccine  if you are aged 72 years or older  The pneumococcal vaccine is an injection given to protect you from pneumococcal disease  Pneumococcal disease is an infection caused by pneumococcal bacteria  The infection may cause pneumonia, meningitis, or an ear infection  · Get a shingles vaccine  if you are 60 or older, even if you have had shingles before  The shingles vaccine is an injection to protect you from the varicella-zoster virus  This is the same virus that causes chickenpox   Shingles is a painful rash that develops in people who had chickenpox or have been exposed to the virus  How to eat healthy:  My Plate is a model for planning healthy meals  It shows the types and amounts of foods that should go on your plate  Fruits and vegetables make up about half of your plate, and grains and protein make up the other half  A serving of dairy is included on the side of your plate  The amount of calories and serving sizes you need depends on your age, gender, weight, and height  Examples of healthy foods are listed below:  · Eat a variety of vegetables  such as dark green, red, and orange vegetables  You can also include canned vegetables low in sodium (salt) and frozen vegetables without added butter or sauces  · Eat a variety of fresh fruits , canned fruit in 100% juice, frozen fruit, and dried fruit  · Include whole grains  At least half of the grains you eat should be whole grains  Examples include whole-wheat bread, wheat pasta, brown rice, and whole-grain cereals such as oatmeal     · Eat a variety of protein foods such as seafood (fish and shellfish), lean meat, and poultry without skin (turkey and chicken)  Examples of lean meats include pork leg, shoulder, or tenderloin, and beef round, sirloin, tenderloin, and extra lean ground beef  Other protein foods include eggs and egg substitutes, beans, peas, soy products, nuts, and seeds  · Choose low-fat dairy products such as skim or 1% milk or low-fat yogurt, cheese, and cottage cheese  · Limit unhealthy fats  such as butter, hard margarine, and shortening  Exercise:  Exercise at least 30 minutes per day on most days of the week  Some examples of exercise include walking, biking, dancing, and swimming  You can also fit in more physical activity by taking the stairs instead of the elevator or parking farther away from stores  Include muscle strengthening activities 2 days each week  Regular exercise provides many health benefits   It helps you manage your weight, and decreases your risk for type 2 diabetes, heart disease, stroke, and high blood pressure  Exercise can also help improve your mood  Ask your healthcare provider about the best exercise plan for you  General health and safety guidelines:   · Do not smoke  Nicotine and other chemicals in cigarettes and cigars can cause lung damage  Ask your healthcare provider for information if you currently smoke and need help to quit  E-cigarettes or smokeless tobacco still contain nicotine  Talk to your healthcare provider before you use these products  · Limit alcohol  A drink of alcohol is 12 ounces of beer, 5 ounces of wine, or 1½ ounces of liquor  · Lose weight, if needed  Being overweight increases your risk of certain health conditions  These include heart disease, high blood pressure, type 2 diabetes, and certain types of cancer  · Protect your skin  Do not sunbathe or use tanning beds  Use sunscreen with a SPF 15 or higher  Apply sunscreen at least 15 minutes before you go outside  Reapply sunscreen every 2 hours  Wear protective clothing, hats, and sunglasses when you are outside  · Drive safely  Always wear your seatbelt  Make sure everyone in your car wears a seatbelt  A seatbelt can save your life if you are in an accident  Do not use your cell phone when you are driving  This could distract you and cause an accident  Pull over if you need to make a call or send a text message  · Practice safe sex  Use latex condoms if are sexually active and have more than one partner  Your healthcare provider may recommend screening tests for sexually transmitted infections (STIs)  · Wear helmets, lifejackets, and protective gear  Always wear a helmet when you ride a bike or motorcycle, go skiing, or play sports that could cause a head injury  Wear protective equipment when you play sports  Wear a lifejacket when you are on a boat or doing water sports      © Copyright [employer] 2022 Information is for End User's use only and may not be sold, redistributed or otherwise used for commercial purposes  All illustrations and images included in CareNotes® are the copyrighted property of [employer]  or [employer]  The above information is an  only  It is not intended as medical advice for individual conditions or treatments  Talk to your doctor, nurse or pharmacist before following any medical regimen to see if it is safe and effective for you  Cholesterol and Your Health   AMBULATORY CARE:   Cholesterol  is a waxy, fat-like substance  Your body uses cholesterol to make hormones and new cells, and to protect nerves  Cholesterol is made by your body  It also comes from certain foods you eat, such as meat and dairy products  Your healthcare provider can help you set goals for your cholesterol levels  He or she can help you create a plan to meet your goals  Cholesterol level goals: Your cholesterol level goals depend on your risk for heart disease, your age, and your other health conditions  The following are general guidelines:  · Total cholesterol  includes low-density lipoprotein (LDL), high-density lipoprotein (HDL), and triglyceride levels  The total cholesterol level should be lower than 200 mg/dL and is best at about 150 mg/dL  · LDL cholesterol  is called bad cholesterol  because it forms plaque in your arteries  As plaque builds up, your arteries become narrow, and less blood flows through  When plaque decreases blood flow to your heart, you may have chest pain  If plaque completely blocks an artery that brings blood to your heart, you may have a heart attack  Plaque can break off and form blood clots  Blood clots may block arteries in your brain and cause a stroke  The level should be less than 130 mg/dL and is best at about 100 mg/dL  · HDL cholesterol  is called good cholesterol  because it helps remove LDL cholesterol from your arteries   It does this by attaching to LDL cholesterol and carrying it to your liver  Your liver breaks down LDL cholesterol so your body can get rid of it  High levels of HDL cholesterol can help prevent a heart attack and stroke  Low levels of HDL cholesterol can increase your risk for heart disease, heart attack, and stroke  The level should be 60 mg/dL or higher  · Triglycerides  are a type of fat that store energy from foods you eat  High levels of triglycerides also cause plaque buildup  This can increase your risk for a heart attack or stroke  If your triglyceride level is high, your LDL cholesterol level may also be high  The level should be less than 150 mg/dL  Any of the following can increase your risk for high cholesterol:   · Smoking cigarettes    · Being overweight or obese, or not getting enough exercise    · Drinking large amounts of alcohol    · A medical condition such as hypertension (high blood pressure) or diabetes    · Certain genes passed from your parents to you    · Age older than 65 years    What you need to know about having your cholesterol levels checked: Adults 21to 39years of age should have their cholesterol levels checked every 4 to 6 years  Adults 45 years or older should have their cholesterol checked every 1 to 2 years  You may need your cholesterol checked more often, or at a younger age, if you have risk factors for heart disease  You may also need to have your cholesterol checked more often if you have other health conditions, such as diabetes  Blood tests are used to check cholesterol levels  Blood tests measure your levels of triglycerides, LDL cholesterol, and HDL cholesterol  How healthy fats affect your cholesterol levels:  Healthy fats, also called unsaturated fats, help lower LDL cholesterol and triglyceride levels  Healthy fats include the following:  · Monounsaturated fats  are found in foods such as olive oil, canola oil, avocado, nuts, and olives      · Polyunsaturated fats,  such as omega 3 fats, are found in fish, such as salmon, trout, and tuna  They can also be found in plant foods such as flaxseed, walnuts, and soybeans  How unhealthy fats affect your cholesterol levels:  Unhealthy fats increase LDL cholesterol and triglyceride levels  They are found in foods high in cholesterol, saturated fat, and trans fat:  · Cholesterol  is found in eggs, dairy, and meat  · Saturated fat  is found in butter, cheese, ice cream, whole milk, and coconut oil  Saturated fat is also found in meat, such as sausage, hot dogs, and bologna  · Trans fat  is found in liquid oils and is used in fried and baked foods  Foods that contain trans fats include chips, crackers, muffins, sweet rolls, microwave popcorn, and cookies  Treatment  for high cholesterol will also decrease your risk of heart disease, heart attack, and stroke  Treatment may include any of the following:  · Lifestyle changes  may include food, exercise, weight loss, and quitting smoking  You may also need to decrease the amount of alcohol you drink  Your healthcare provider will want you to start with lifestyle changes  Other treatment may be added if lifestyle changes are not enough  Your healthcare provider may recommend you work with a team to manage hyperlipidemia  The team may include medical experts such as a dietitian, an exercise or physical therapist, and a behavior therapist  Your family members may be included in helping you create lifestyle changes  · Medicines  may be given to lower your LDL cholesterol, triglyceride levels, or total cholesterol level  You may need medicines to lower your cholesterol if any of the following is true:    ? You have a history of stroke, TIA, unstable angina, or a heart attack  ? Your LDL cholesterol level is 190 mg/dL or higher  ? You are age 36 to 76 years, have diabetes or heart disease risk factors, and your LDL cholesterol is 70 mg/dL or higher      · Supplements  include fish oil, red yeast rice, and garlic  Fish oil may help lower your triglyceride and LDL cholesterol levels  It may also increase your HDL cholesterol level  Red yeast rice may help decrease your total cholesterol level and LDL cholesterol level  Garlic may help lower your total cholesterol level  Do not take any supplements without talking to your healthcare provider  Food changes you can make to lower your cholesterol levels:  A dietitian can help you create a healthy eating plan  He or she can show you how to read food labels and choose foods low in saturated fat, trans fats, and cholesterol  · Decrease the total amount of fat you eat  Choose lean meats, fat-free or 1% fat milk, and low-fat dairy products, such as yogurt and cheese  Try to limit or avoid red meats  Limit or do not eat fried foods or baked goods, such as cookies  · Replace unhealthy fats with healthy fats  Cook foods in olive oil or canola oil  Choose soft margarines that are low in saturated fat and trans fat  Seeds, nuts, and avocados are other examples of healthy fats  · Eat foods with omega-3 fats  Examples include salmon, tuna, mackerel, walnuts, and flaxseed  Eat fish 2 times per week  Pregnant women should not eat fish that have high levels of mercury, such as shark, swordfish, and chele mackerel  · Increase the amount of high-fiber foods you eat  High-fiber foods can help lower your LDL cholesterol  Aim to get between 20 and 30 grams of fiber each day  Fruits and vegetables are high in fiber  Eat at least 5 servings each day  Other high-fiber foods are whole-grain or whole-wheat breads, pastas, or cereals, and brown rice  Eat 3 ounces of whole-grain foods each day  Increase fiber slowly  You may have abdominal discomfort, bloating, and gas if you add fiber to your diet too quickly  · Eat healthy protein foods  Examples include low-fat dairy products, skinless chicken and turkey, fish, and nuts      · Limit foods and drinks that are high in sugar  Your dietitian or healthcare provider can help you create daily limits for high-sugar foods and drinks  The limit may be lower if you have diabetes or another health condition  Limits can also help you lose weight if needed  Lifestyle changes you can make to lower your cholesterol levels:   · Maintain a healthy weight  Ask your healthcare provider what a healthy weight is for you  Ask him or her to help you create a weight loss plan if needed  Weight loss can decrease your total cholesterol and triglyceride levels  Weight loss may also help keep your blood pressure at a healthy level  · Be physically active throughout the day  Physical activity, such as exercise, can help lower your total cholesterol level and maintain a healthy weight  Physical activity can also help increase your HDL cholesterol level  Work with your healthcare provider to create an program that is right for you  Get at least 30 to 40 minutes of moderate physical activity most days of the week  Examples of exercise include brisk walking, swimming, or biking  Also include strength training at least 2 times each week  Your healthcare providers can help you create a physical activity plan  · Do not smoke  Nicotine and other chemicals in cigarettes and cigars can raise your cholesterol levels  Ask your healthcare provider for information if you currently smoke and need help to quit  E-cigarettes or smokeless tobacco still contain nicotine  Talk to your healthcare provider before you use these products  · Limit or do not drink alcohol  Alcohol can increase your triglyceride levels  Ask your healthcare provider before you drink alcohol  Ask how much is okay for you to drink in 24 hours or 1 week  Follow up with your doctor as directed:  Write down your questions so you remember to ask them during your visits    © Copyright [employer] 2022 Information is for End User's use only and may not be sold, redistributed or otherwise used for commercial purposes  All illustrations and images included in CareNotes® are the copyrighted property of [employer]  or [employer]  The above information is an  only  It is not intended as medical advice for individual conditions or treatments  Talk to your doctor, nurse or pharmacist before following any medical regimen to see if it is safe and effective for you

## 2022-05-20 NOTE — PROGRESS NOTES
[address]     NAME: Abigail Lyla  AGE: 36 y o  SEX: female  : 1990     DATE: 2022     Assessment and Plan:     Problem List Items Addressed This Visit        Respiratory    Mild intermittent asthma without complication (Chronic)    Relevant Orders    CBC and differential       Cardiovascular and Mediastinum    Orthostatic hypotension       Other    High-functioning autism spectrum disorder    Annual physical exam - Primary    Raynaud's phenomenon without gangrene    Relevant Orders    CBC and differential    Comprehensive metabolic panel    Lipid panel    C-reactive protein    Lyme Antibody Profile with reflex to WB    RF Screen w/ Reflex to Titer    TSH, 3rd generation with Free T4 reflex      Other Visit Diagnoses     Frequent headaches        Relevant Medications    SUMAtriptan (Imitrex) 50 mg tablet          Immunizations and preventive care screenings were discussed with patient today  Appropriate education was printed on patient's after visit summary  Counseling:  Dental Health: discussed importance of regular tooth brushing, flossing, and dental visits  Injury prevention: discussed safety/seat belts, safety helmets, smoke detectors, carbon dioxide detectors, and smoking near bedding or upholstery  · Exercise: the importance of regular exercise/physical activity was discussed  Recommend exercise 3-5 times per week for at least 30 minutes  BMI Counseling: Body mass index is 26 73 kg/m²  The BMI is above normal  Nutrition recommendations include encouraging healthy choices of fruits and vegetables, consuming healthier snacks, limiting drinks that contain sugar, moderation in carbohydrate intake, increasing intake of lean protein, reducing intake of saturated and trans fat and reducing intake of cholesterol   Exercise recommendations include moderate physical activity 150 minutes/week  No pharmacotherapy was ordered  Rationale for BMI follow-up plan is due to patient being overweight or obese  No follow-ups on file  Chief Complaint:     Chief Complaint   Patient presents with    Physical Exam     Pt presents today for an annual physical exam      Numbness     Pt c/o numbness in both of her feet which onset last month   Dizziness     Pt c/o dizziness which onset about 7 months ago  Pt state that these episodes happen when she goes from a seated to standing position and when she bends forward and stands back up  Orthostatic BP's taken today  History of Present Illness:     Adult Annual Physical   Patient here for a comprehensive physical exam  The patient reports problems - headaches usually around her period, dizziness with standing up quickly, hands and toes get cold and tingle  Diet and Physical Activity  · Diet/Nutrition: well balanced diet  · Exercise: moderate cardiovascular exercise  Depression Screening  PHQ-2/9 Depression Screening    Little interest or pleasure in doing things: 0 - not at all  Feeling down, depressed, or hopeless: 0 - not at all  Trouble falling or staying asleep, or sleeping too much: 0 - not at all  Feeling tired or having little energy: 0 - not at all  Poor appetite or overeatin - not at all  Feeling bad about yourself - or that you are a failure or have let yourself or your family down: 0 - not at all  Trouble concentrating on things, such as reading the newspaper or watching television: 0 - not at all  Moving or speaking so slowly that other people could have noticed  Or the opposite - being so fidgety or restless that you have been moving around a lot more than usual: 0 - not at all  Thoughts that you would be better off dead, or of hurting yourself in some way: 0 - not at all  PHQ-9 Score: 0   PHQ-9 Interpretation: No or Minimal depression        General Health  · Sleep: sleeps well     · Hearing: normal - bilateral   · Vision: most recent eye exam >1 year ago  · Dental: no dental visits for >1 year  /GYN Health  · Last menstrual period: 05/20/2022  · Contraceptive method: barrier methods  · History of STDs?: no      Review of Systems:     Review of Systems   Constitutional: Negative for appetite change, fatigue, fever and unexpected weight change  HENT: Negative for congestion, dental problem, ear pain, hearing loss, mouth sores, nosebleeds, rhinorrhea, tinnitus, trouble swallowing and voice change  Eyes: Negative for photophobia, pain, discharge and visual disturbance  Respiratory: Negative for cough, chest tightness, shortness of breath and wheezing  Cardiovascular: Negative for chest pain and palpitations  Gastrointestinal: Negative for abdominal pain, blood in stool, constipation, diarrhea, nausea, rectal pain and vomiting  Endocrine: Negative for cold intolerance, polydipsia, polyphagia and polyuria  Genitourinary: Negative for decreased urine volume, difficulty urinating, dysuria, enuresis, frequency, genital sores, hematuria and urgency  Musculoskeletal: Negative for arthralgias, back pain, gait problem, joint swelling, myalgias, neck pain and neck stiffness  Skin: Positive for color change  Negative for rash  Color change of fingers and toes   Allergic/Immunologic: Negative for environmental allergies, food allergies and immunocompromised state  Neurological: Positive for headaches  Negative for dizziness, seizures, speech difficulty and light-headedness  Hematological: Negative for adenopathy  Does not bruise/bleed easily  Psychiatric/Behavioral: Negative for behavioral problems, confusion, decreased concentration, self-injury and sleep disturbance  The patient is not nervous/anxious and is not hyperactive         Past Medical History:     Past Medical History:   Diagnosis Date    Asthma     childhood asthma  inhaler prn    Depression     diag with depression/anxiety age 27 -no meds now    Migraine     diag 2 yrs ago- takes extra-strength tylenol    Termination of pregnancy (fetus)     Urinary tract infection     hx of  last episode 2018    Varicella     received vaccines      Past Surgical History:     Past Surgical History:   Procedure Laterality Date    NO PAST SURGERIES      WISDOM TOOTH EXTRACTION  age 23      Social History:     Social History     Socioeconomic History    Marital status: Single     Spouse name: None    Number of children: None    Years of education: None    Highest education level: None   Occupational History    None   Tobacco Use    Smoking status: Former Smoker     Packs/day: 0 25     Years: 10 00     Pack years: 2 50     Types: Cigarettes     Quit date: 10/1/2018     Years since quitting: 3 6    Smokeless tobacco: Never Used    Tobacco comment: smokes 1 cigarette per dAY!    Vaping Use    Vaping Use: Never used   Substance and Sexual Activity    Alcohol use: Yes     Comment: occasional-none since pregnancy    Drug use: Not Currently     Types: Marijuana     Comment: Lst used 11/2018    Sexual activity: Yes     Partners: Male     Birth control/protection: Injection     Comment: depo   Other Topics Concern    None   Social History Narrative    None     Social Determinants of Health     Financial Resource Strain: Not on file   Food Insecurity: Not on file   Transportation Needs: Not on file   Physical Activity: Not on file   Stress: Not on file   Social Connections: Not on file   Intimate Partner Violence: Not on file   Housing Stability: Not on file      Family History:     Family History   Problem Relation Age of Onset    Asthma Mother     Bipolar disorder Mother     Post-traumatic stress disorder Mother     Depression Sister     Drug abuse Sister     Cancer Maternal Grandfather     Depression Maternal Uncle     Anxiety disorder Maternal Uncle     Diabetes Paternal Aunt     Breast cancer Family     Anxiety disorder Father     Colon cancer Neg Hx     Ovarian cancer Neg Hx     Uterine cancer Neg Hx     Cervical cancer Neg Hx       Current Medications:     Current Outpatient Medications   Medication Sig Dispense Refill    PROAIR  (90 Base) MCG/ACT inhaler inhale 1 puff by mouth every 6 hours if needed  0    SUMAtriptan (Imitrex) 50 mg tablet Take 1 tablet (50 mg total) by mouth once as needed for migraine for up to 1 dose 9 tablet 0     No current facility-administered medications for this visit  Allergies:     No Known Allergies   Physical Exam:     /70 (BP Location: Left arm, Patient Position: Standing, Cuff Size: Adult)   Pulse 81   Temp 98 3 °F (36 8 °C) (Tympanic)   Ht 5' 9" (1 753 m)   Wt 82 1 kg (181 lb)   SpO2 99%   BMI 26 73 kg/m²     Physical Exam  Vitals and nursing note reviewed  Constitutional:       Appearance: Normal appearance  HENT:      Head: Normocephalic and atraumatic  Right Ear: Tympanic membrane, ear canal and external ear normal       Left Ear: Tympanic membrane, ear canal and external ear normal       Nose: Nose normal       Mouth/Throat:      Mouth: Mucous membranes are moist       Pharynx: Oropharynx is clear  Eyes:      Extraocular Movements: Extraocular movements intact  Conjunctiva/sclera: Conjunctivae normal    Neck:      Thyroid: No thyromegaly  Vascular: No carotid bruit  Cardiovascular:      Rate and Rhythm: Normal rate and regular rhythm  Pulses: Normal pulses  Heart sounds: Normal heart sounds  Pulmonary:      Effort: Pulmonary effort is normal       Breath sounds: Normal breath sounds  Abdominal:      General: Abdomen is flat  Bowel sounds are normal       Palpations: Abdomen is soft  There is no hepatomegaly, splenomegaly or mass  Tenderness: There is no abdominal tenderness  There is no right CVA tenderness or left CVA tenderness  Musculoskeletal:         General: Normal range of motion        Cervical back: Normal range of motion and neck supple  Right lower leg: No edema  Left lower leg: No edema  Lymphadenopathy:      Cervical: No cervical adenopathy  Skin:     General: Skin is warm and dry  Neurological:      General: No focal deficit present  Mental Status: She is alert and oriented to person, place, and time  Psychiatric:         Mood and Affect: Mood normal          Behavior: Behavior normal          Thought Content:  Thought content normal          Judgment: Judgment normal           DAVIS Crocker [id number] Darron [address]gins

## 2022-05-24 ENCOUNTER — APPOINTMENT (OUTPATIENT)
Dept: LAB | Facility: CLINIC | Age: 32
End: 2022-05-24
Payer: COMMERCIAL

## 2022-05-24 DIAGNOSIS — J45.20 MILD INTERMITTENT ASTHMA WITHOUT COMPLICATION: ICD-10-CM

## 2022-05-24 DIAGNOSIS — I73.00 RAYNAUD'S PHENOMENON WITHOUT GANGRENE: ICD-10-CM

## 2022-05-24 LAB
ALBUMIN SERPL BCP-MCNC: 3.7 G/DL (ref 3.5–5)
ALP SERPL-CCNC: 88 U/L (ref 46–116)
ALT SERPL W P-5'-P-CCNC: 16 U/L (ref 12–78)
ANION GAP SERPL CALCULATED.3IONS-SCNC: 4 MMOL/L (ref 4–13)
AST SERPL W P-5'-P-CCNC: 11 U/L (ref 5–45)
BASOPHILS # BLD AUTO: 0.07 THOUSANDS/ΜL (ref 0–0.1)
BASOPHILS NFR BLD AUTO: 1 % (ref 0–1)
BILIRUB SERPL-MCNC: 1.46 MG/DL (ref 0.2–1)
BUN SERPL-MCNC: 17 MG/DL (ref 5–25)
CALCIUM SERPL-MCNC: 9.6 MG/DL (ref 8.3–10.1)
CHLORIDE SERPL-SCNC: 108 MMOL/L (ref 100–108)
CHOLEST SERPL-MCNC: 169 MG/DL
CO2 SERPL-SCNC: 29 MMOL/L (ref 21–32)
CREAT SERPL-MCNC: 0.85 MG/DL (ref 0.6–1.3)
CRP SERPL QL: <3 MG/L
EOSINOPHIL # BLD AUTO: 0.11 THOUSAND/ΜL (ref 0–0.61)
EOSINOPHIL NFR BLD AUTO: 2 % (ref 0–6)
ERYTHROCYTE [DISTWIDTH] IN BLOOD BY AUTOMATED COUNT: 12.6 % (ref 11.6–15.1)
GFR SERPL CREATININE-BSD FRML MDRD: 92 ML/MIN/1.73SQ M
GLUCOSE P FAST SERPL-MCNC: 94 MG/DL (ref 65–99)
HCT VFR BLD AUTO: 41.4 % (ref 34.8–46.1)
HDLC SERPL-MCNC: 70 MG/DL
HGB BLD-MCNC: 13.3 G/DL (ref 11.5–15.4)
IMM GRANULOCYTES # BLD AUTO: 0.01 THOUSAND/UL (ref 0–0.2)
IMM GRANULOCYTES NFR BLD AUTO: 0 % (ref 0–2)
LDLC SERPL CALC-MCNC: 81 MG/DL (ref 0–100)
LYMPHOCYTES # BLD AUTO: 3.01 THOUSANDS/ΜL (ref 0.6–4.47)
LYMPHOCYTES NFR BLD AUTO: 52 % (ref 14–44)
MCH RBC QN AUTO: 29.4 PG (ref 26.8–34.3)
MCHC RBC AUTO-ENTMCNC: 32.1 G/DL (ref 31.4–37.4)
MCV RBC AUTO: 91 FL (ref 82–98)
MONOCYTES # BLD AUTO: 0.45 THOUSAND/ΜL (ref 0.17–1.22)
MONOCYTES NFR BLD AUTO: 8 % (ref 4–12)
NEUTROPHILS # BLD AUTO: 2.12 THOUSANDS/ΜL (ref 1.85–7.62)
NEUTS SEG NFR BLD AUTO: 37 % (ref 43–75)
NONHDLC SERPL-MCNC: 99 MG/DL
NRBC BLD AUTO-RTO: 0 /100 WBCS
PLATELET # BLD AUTO: 309 THOUSANDS/UL (ref 149–390)
PMV BLD AUTO: 9.9 FL (ref 8.9–12.7)
POTASSIUM SERPL-SCNC: 4.2 MMOL/L (ref 3.5–5.3)
PROT SERPL-MCNC: 7.1 G/DL (ref 6.4–8.2)
RBC # BLD AUTO: 4.53 MILLION/UL (ref 3.81–5.12)
SODIUM SERPL-SCNC: 141 MMOL/L (ref 136–145)
TRIGL SERPL-MCNC: 88 MG/DL
TSH SERPL DL<=0.05 MIU/L-ACNC: 2.66 UIU/ML (ref 0.45–4.5)
WBC # BLD AUTO: 5.77 THOUSAND/UL (ref 4.31–10.16)

## 2022-05-24 PROCEDURE — 86618 LYME DISEASE ANTIBODY: CPT

## 2022-05-24 PROCEDURE — 86430 RHEUMATOID FACTOR TEST QUAL: CPT

## 2022-05-24 PROCEDURE — 84443 ASSAY THYROID STIM HORMONE: CPT

## 2022-05-24 PROCEDURE — 80061 LIPID PANEL: CPT

## 2022-05-24 PROCEDURE — 86140 C-REACTIVE PROTEIN: CPT

## 2022-05-24 PROCEDURE — 36415 COLL VENOUS BLD VENIPUNCTURE: CPT

## 2022-05-24 PROCEDURE — 80053 COMPREHEN METABOLIC PANEL: CPT

## 2022-05-24 PROCEDURE — 85025 COMPLETE CBC W/AUTO DIFF WBC: CPT

## 2022-05-25 DIAGNOSIS — E80.6 HYPERBILIRUBINEMIA: Primary | ICD-10-CM

## 2022-05-25 LAB
B BURGDOR IGG+IGM SER-ACNC: 0
RHEUMATOID FACT SER QL LA: NEGATIVE

## 2022-06-18 DIAGNOSIS — R51.9 FREQUENT HEADACHES: ICD-10-CM

## 2022-06-18 RX ORDER — SUMATRIPTAN 50 MG/1
TABLET, FILM COATED ORAL
Qty: 9 TABLET | Refills: 0 | Status: SHIPPED | OUTPATIENT
Start: 2022-06-18

## 2022-08-13 ENCOUNTER — HOSPITAL ENCOUNTER (EMERGENCY)
Facility: HOSPITAL | Age: 32
Discharge: HOME | End: 2022-08-13
Attending: EMERGENCY MEDICINE
Payer: COMMERCIAL

## 2022-08-13 ENCOUNTER — APPOINTMENT (EMERGENCY)
Dept: RADIOLOGY | Facility: HOSPITAL | Age: 32
End: 2022-08-13
Payer: COMMERCIAL

## 2022-08-13 VITALS
TEMPERATURE: 97.9 F | RESPIRATION RATE: 17 BRPM | HEART RATE: 60 BPM | OXYGEN SATURATION: 98 % | DIASTOLIC BLOOD PRESSURE: 67 MMHG | SYSTOLIC BLOOD PRESSURE: 121 MMHG

## 2022-08-13 DIAGNOSIS — R51.9 NONINTRACTABLE HEADACHE, UNSPECIFIED CHRONICITY PATTERN, UNSPECIFIED HEADACHE TYPE: Primary | ICD-10-CM

## 2022-08-13 LAB
ALBUMIN SERPL BCP-MCNC: 3.8 G/DL (ref 3.5–5)
ALP SERPL-CCNC: 95 U/L (ref 46–116)
ALT SERPL W P-5'-P-CCNC: 18 U/L (ref 12–78)
ANION GAP SERPL CALCULATED.3IONS-SCNC: 7 MMOL/L (ref 4–13)
AST SERPL W P-5'-P-CCNC: 15 U/L (ref 5–45)
BASOPHILS # BLD AUTO: 0.04 THOUSANDS/ΜL (ref 0–0.1)
BASOPHILS NFR BLD AUTO: 1 % (ref 0–1)
BILIRUB SERPL-MCNC: 0.2 MG/DL (ref 0.2–1)
BUN SERPL-MCNC: 15 MG/DL (ref 5–25)
CALCIUM SERPL-MCNC: 9.4 MG/DL (ref 8.3–10.1)
CARDIAC TROPONIN I PNL SERPL HS: <2 NG/L
CHLORIDE SERPL-SCNC: 105 MMOL/L (ref 96–108)
CO2 SERPL-SCNC: 29 MMOL/L (ref 21–32)
CREAT SERPL-MCNC: 0.88 MG/DL (ref 0.6–1.3)
EOSINOPHIL # BLD AUTO: 0.11 THOUSAND/ΜL (ref 0–0.61)
EOSINOPHIL NFR BLD AUTO: 2 % (ref 0–6)
ERYTHROCYTE [DISTWIDTH] IN BLOOD BY AUTOMATED COUNT: 12.2 % (ref 11.6–15.1)
GFR SERPL CREATININE-BSD FRML MDRD: 89 ML/MIN/1.73SQ M
GLUCOSE SERPL-MCNC: 98 MG/DL (ref 65–140)
HCG SERPL QL: NEGATIVE
HCT VFR BLD AUTO: 38.7 % (ref 34.8–46.1)
HGB BLD-MCNC: 12.9 G/DL (ref 11.5–15.4)
IMM GRANULOCYTES # BLD AUTO: 0.01 THOUSAND/UL (ref 0–0.2)
IMM GRANULOCYTES NFR BLD AUTO: 0 % (ref 0–2)
LYMPHOCYTES # BLD AUTO: 3.06 THOUSANDS/ΜL (ref 0.6–4.47)
LYMPHOCYTES NFR BLD AUTO: 41 % (ref 14–44)
MCH RBC QN AUTO: 29.7 PG (ref 26.8–34.3)
MCHC RBC AUTO-ENTMCNC: 33.3 G/DL (ref 31.4–37.4)
MCV RBC AUTO: 89 FL (ref 82–98)
MONOCYTES # BLD AUTO: 0.43 THOUSAND/ΜL (ref 0.17–1.22)
MONOCYTES NFR BLD AUTO: 6 % (ref 4–12)
NEUTROPHILS # BLD AUTO: 3.74 THOUSANDS/ΜL (ref 1.85–7.62)
NEUTS SEG NFR BLD AUTO: 50 % (ref 43–75)
NRBC BLD AUTO-RTO: 0 /100 WBCS
PLATELET # BLD AUTO: 298 THOUSANDS/UL (ref 149–390)
PMV BLD AUTO: 9.4 FL (ref 8.9–12.7)
POTASSIUM SERPL-SCNC: 3.9 MMOL/L (ref 3.5–5.3)
PROT SERPL-MCNC: 7 G/DL (ref 6.4–8.4)
RBC # BLD AUTO: 4.35 MILLION/UL (ref 3.81–5.12)
SODIUM SERPL-SCNC: 141 MMOL/L (ref 135–147)
WBC # BLD AUTO: 7.39 THOUSAND/UL (ref 4.31–10.16)

## 2022-08-13 PROCEDURE — 36415 COLL VENOUS BLD VENIPUNCTURE: CPT | Performed by: EMERGENCY MEDICINE

## 2022-08-13 PROCEDURE — 96361 HYDRATE IV INFUSION ADD-ON: CPT

## 2022-08-13 PROCEDURE — 84484 ASSAY OF TROPONIN QUANT: CPT | Performed by: EMERGENCY MEDICINE

## 2022-08-13 PROCEDURE — 96375 TX/PRO/DX INJ NEW DRUG ADDON: CPT

## 2022-08-13 PROCEDURE — 84703 CHORIONIC GONADOTROPIN ASSAY: CPT | Performed by: EMERGENCY MEDICINE

## 2022-08-13 PROCEDURE — 71045 X-RAY EXAM CHEST 1 VIEW: CPT

## 2022-08-13 PROCEDURE — 96365 THER/PROPH/DIAG IV INF INIT: CPT

## 2022-08-13 PROCEDURE — 80053 COMPREHEN METABOLIC PANEL: CPT | Performed by: EMERGENCY MEDICINE

## 2022-08-13 PROCEDURE — 85025 COMPLETE CBC W/AUTO DIFF WBC: CPT | Performed by: EMERGENCY MEDICINE

## 2022-08-13 PROCEDURE — 99284 EMERGENCY DEPT VISIT MOD MDM: CPT | Performed by: EMERGENCY MEDICINE

## 2022-08-13 PROCEDURE — 93005 ELECTROCARDIOGRAM TRACING: CPT

## 2022-08-13 PROCEDURE — 99284 EMERGENCY DEPT VISIT MOD MDM: CPT

## 2022-08-13 RX ORDER — MAGNESIUM SULFATE HEPTAHYDRATE 40 MG/ML
2 INJECTION, SOLUTION INTRAVENOUS ONCE
Status: COMPLETED | OUTPATIENT
Start: 2022-08-13 | End: 2022-08-13

## 2022-08-13 RX ORDER — DIPHENHYDRAMINE HYDROCHLORIDE 50 MG/ML
25 INJECTION INTRAMUSCULAR; INTRAVENOUS ONCE
Status: COMPLETED | OUTPATIENT
Start: 2022-08-13 | End: 2022-08-13

## 2022-08-13 RX ORDER — LIDOCAINE HYDROCHLORIDE AND EPINEPHRINE 10; 10 MG/ML; UG/ML
10 INJECTION, SOLUTION INFILTRATION; PERINEURAL ONCE
Status: COMPLETED | OUTPATIENT
Start: 2022-08-13 | End: 2022-08-13

## 2022-08-13 RX ORDER — ACETAMINOPHEN 325 MG/1
650 TABLET ORAL ONCE
Status: COMPLETED | OUTPATIENT
Start: 2022-08-13 | End: 2022-08-13

## 2022-08-13 RX ORDER — METOCLOPRAMIDE HYDROCHLORIDE 5 MG/ML
10 INJECTION INTRAMUSCULAR; INTRAVENOUS ONCE
Status: COMPLETED | OUTPATIENT
Start: 2022-08-13 | End: 2022-08-13

## 2022-08-13 RX ORDER — KETOROLAC TROMETHAMINE 30 MG/ML
15 INJECTION, SOLUTION INTRAMUSCULAR; INTRAVENOUS ONCE
Status: COMPLETED | OUTPATIENT
Start: 2022-08-13 | End: 2022-08-13

## 2022-08-13 RX ADMIN — KETOROLAC TROMETHAMINE 15 MG: 30 INJECTION, SOLUTION INTRAMUSCULAR at 18:16

## 2022-08-13 RX ADMIN — LIDOCAINE HYDROCHLORIDE,EPINEPHRINE BITARTRATE 10 ML: 10; .01 INJECTION, SOLUTION INFILTRATION; PERINEURAL at 21:15

## 2022-08-13 RX ADMIN — MAGNESIUM SULFATE HEPTAHYDRATE 2 G: 40 INJECTION, SOLUTION INTRAVENOUS at 18:21

## 2022-08-13 RX ADMIN — DIPHENHYDRAMINE HYDROCHLORIDE 25 MG: 50 INJECTION, SOLUTION INTRAMUSCULAR; INTRAVENOUS at 17:40

## 2022-08-13 RX ADMIN — ACETAMINOPHEN 650 MG: 325 TABLET, FILM COATED ORAL at 17:39

## 2022-08-13 RX ADMIN — METOCLOPRAMIDE 10 MG: 5 INJECTION, SOLUTION INTRAMUSCULAR; INTRAVENOUS at 17:40

## 2022-08-13 RX ADMIN — SODIUM CHLORIDE 1000 ML: 0.9 INJECTION, SOLUTION INTRAVENOUS at 17:29

## 2022-08-13 NOTE — ED PROVIDER NOTES
Pt Name: Ashly Kuo  MRN: [id number]  [place] 1990  Age/Sex: 36 y o  female  Date of evaluation: 8/13/2022  PCP: Nona Leavitt PA-C    CHIEF COMPLAINT    Chief Complaint   Patient presents with    Headache - Recurrent or Known Dx Migraines     Day 7 of migraine  Hx of same  Experiencing left sided head and facial pain and numbness          HPI and MDM    36 y o  female  presenting with headache  Patient states this started last Thursday, has had constant headache since then, however severity worsens at times, sometimes she feels a sharp stabbing pain in the back of the left side of her head  Also gets intermittent left-sided facial tingling  Feels like prior headaches  Took her headache medication at home without relief  For the last 3 days she feels like her heart has been beating heavily as well  No nausea or vomiting, no visual changes, no weakness  No fevers or chills, no recent traveling  No focal deficits on my examination  Patient's vitals are reassuring  Doubt ACS, PE or acute aortic pathology  Workup in the emergency department is reassuring  Patient given migraine cocktail, does feel somewhat better afterwards but pain is still present  Upon repeat examination, she does have tenderness in the left paracervical musculature, primarily at the base of the skull  Will try a trigger point injection, risks and benefits discussed, patient verbalized understanding  ED Course as of 08/14/22 1740   Sat Aug 13, 2022   1759 XR chest 1 view portable  No acute cardiopulmonary processes on my interpretation  A trigger point injection was performed at the site of maximal tenderness using 1% Lidocaine with Epi  This was well tolerated, and followed by complete relief of pain  Advised supportive care at home, PCP follow-up, return precautions discussed, patient verbalized understanding and is in agreement with plan      Medications   sodium chloride 0 9 % bolus 1,000 mL (0 mL Intravenous Stopped 8/13/22 1945)   metoclopramide (REGLAN) injection 10 mg (10 mg Intravenous Given 8/13/22 1740)   diphenhydrAMINE (BENADRYL) injection 25 mg (25 mg Intravenous Given 8/13/22 1740)   magnesium sulfate 2 g/50 mL IVPB (premix) 2 g (0 g Intravenous Stopped 8/13/22 1940)   acetaminophen (TYLENOL) tablet 650 mg (650 mg Oral Given 8/13/22 1739)   ketorolac (TORADOL) injection 15 mg (15 mg Intravenous Given 8/13/22 1816)   lidocaine-epinephrine (XYLOCAINE/EPINEPHRINE) 1 %-1:100,000 injection 10 mL (10 mL Infiltration Given by Other 8/13/22 2115)         Past Medical and Surgical History    Past Medical History:   Diagnosis Date    Asthma     childhood asthma  inhaler prn    Depression     diag with depression/anxiety age 27 -no meds now    Migraine     diag 2 yrs ago- takes extra-strength tylenol    Termination of pregnancy (fetus)     Urinary tract infection     hx of  last episode 2018    Varicella     received vaccines       Past Surgical History:   Procedure Laterality Date    NO PAST SURGERIES      WISDOM TOOTH EXTRACTION  age 23       Family History   Problem Relation Age of Onset    Asthma Mother     Bipolar disorder Mother     Post-traumatic stress disorder Mother     Depression Sister     Drug abuse Sister     Cancer Maternal Grandfather     Depression Maternal Uncle     Anxiety disorder Maternal Uncle     Diabetes Paternal Aunt     Breast cancer Family     Anxiety disorder Father     Colon cancer Neg Hx     Ovarian cancer Neg Hx     Uterine cancer Neg Hx     Cervical cancer Neg Hx        Social History     Tobacco Use    Smoking status: Former Smoker     Packs/day: 0 25     Years: 10 00     Pack years: 2 50     Types: Cigarettes     Quit date: 10/1/2018     Years since quitting: 3 8    Smokeless tobacco: Never Used    Tobacco comment: smokes 1 cigarette per dAY!    Vaping Use    Vaping Use: Never used   Substance Use Topics    Alcohol use: Yes     Comment: occasional-none since pregnancy    Drug use: Not Currently     Types: Marijuana     Comment: Lst used 11/2018           Allergies    No Known Allergies    Home Medications    Prior to Admission medications    Medication Sig Start Date End Date Taking? Authorizing Provider   PROAIR  (56 Base) MCG/ACT inhaler inhale 1 puff by mouth every 6 hours if needed 11/19/17   Historical Provider, MD   SUMAtriptan (IMITREX) 50 mg tablet take 1 tablet by mouth if needed  [place] may repeat in 2 hours IF headache PERSISTS maximum daily dose of 2 tablets ( 100 milligrams ) every 24 hours 6/18/22   Shawna Lira PA-C           Review of Systems    Review of Systems   Constitutional: Negative for chills and fever  HENT: Negative for rhinorrhea and sore throat  Eyes: Negative for pain and visual disturbance  Respiratory: Negative for cough and shortness of breath  Cardiovascular: Negative for chest pain and leg swelling  Gastrointestinal: Negative for abdominal pain, nausea and vomiting  Genitourinary: Negative for dysuria and hematuria  Musculoskeletal: Negative for back pain and myalgias  Skin: Negative for rash and wound  Neurological: Positive for headaches  Negative for syncope  Physical Exam      ED Triage Vitals   Temperature Pulse Respirations Blood Pressure SpO2   08/13/22 1645 08/13/22 1645 08/13/22 1645 08/13/22 1645 08/13/22 1645   97 9 °F (36 6 °C) 93 18 133/75 99 %      Temp Source Heart Rate Source Patient Position - Orthostatic VS BP Location FiO2 (%)   08/13/22 1645 08/13/22 1645 08/13/22 1645 08/13/22 1645 --   Tympanic Monitor Sitting Left arm       Pain Score       08/13/22 1739       7               Physical Exam  Constitutional:       General: She is not in acute distress  Appearance: She is not ill-appearing  HENT:      Head: Normocephalic and atraumatic        Nose: Nose normal       Mouth/Throat:      Mouth: Mucous membranes are moist    Eyes: Extraocular Movements: Extraocular movements intact  Pupils: Pupils are equal, round, and reactive to light  Cardiovascular:      Rate and Rhythm: Normal rate and regular rhythm  Pulmonary:      Effort: No respiratory distress  Breath sounds: Normal breath sounds  No wheezing  Abdominal:      General: There is no distension  Palpations: Abdomen is soft  Tenderness: There is no abdominal tenderness  Musculoskeletal:         General: No swelling or deformity  Normal range of motion  Cervical back: Normal range of motion and neck supple  Skin:     General: Skin is warm  Findings: No erythema  Neurological:      Mental Status: She is alert and oriented to person, place, and time  Mental status is at baseline  Cranial Nerves: No cranial nerve deficit  Sensory: No sensory deficit  Motor: No weakness        Comments: Normal finger-nose exam bilaterally              Diagnostic Results      Labs:    Results Reviewed     Procedure Component Value Units Date/Time    HS Troponin 0hr (reflex protocol) [[id number]]  (Normal) Collected: 08/13/22 1727    Lab Status: Final result Specimen: Blood from Arm, Right Updated: 08/13/22 1756     hs TnI 0hr <2 ng/L     hCG, qualitative pregnancy [[id number]]  (Normal) Collected: 08/13/22 1727    Lab Status: Final result Specimen: Blood from Arm, Right Updated: 08/13/22 1755     Preg, Serum Negative    Comprehensive metabolic panel [[id number]] Collected: 08/13/22 1727    Lab Status: Final result Specimen: Blood from Arm, Right Updated: 08/13/22 1750     Sodium 141 mmol/L      Potassium 3 9 mmol/L      Chloride 105 mmol/L      CO2 29 mmol/L      ANION GAP 7 mmol/L      BUN 15 mg/dL      Creatinine 0 88 mg/dL      Glucose 98 mg/dL      Calcium 9 4 mg/dL      AST 15 U/L      ALT 18 U/L      Alkaline Phosphatase 95 U/L      Total Protein 7 0 g/dL      Albumin 3 8 g/dL      Total Bilirubin 0 20 mg/dL      eGFR 89 ml/min/1 73sq m     Narrative: National Kidney Disease Foundation guidelines for Chronic Kidney Disease (CKD):     Stage 1 with normal or high GFR (GFR > 90 mL/min/1 73 square meters)    Stage 2 Mild CKD (GFR = 60-89 mL/min/1 73 square meters)    Stage 3A Moderate CKD (GFR = 45-59 mL/min/1 73 square meters)    Stage 3B Moderate CKD (GFR = 30-44 mL/min/1 73 square meters)    Stage 4 Severe CKD (GFR = 15-29 mL/min/1 73 square meters)    Stage 5 End Stage CKD (GFR <15 mL/min/1 73 square meters)  Note: GFR calculation is accurate only with a steady state creatinine    CBC and differential [[id number]] Collected: 08/13/22 1727    Lab Status: Final result Specimen: Blood from Arm, Right Updated: 08/13/22 1732     WBC 7 39 Thousand/uL      RBC 4 35 Million/uL      Hemoglobin 12 9 g/dL      Hematocrit 38 7 %      MCV 89 fL      MCH 29 7 pg      MCHC 33 3 g/dL      RDW 12 2 %      MPV 9 4 fL      Platelets 325 Thousands/uL      nRBC 0 /100 WBCs      Neutrophils Relative 50 %      Immat GRANS % 0 %      Lymphocytes Relative 41 %      Monocytes Relative 6 %      Eosinophils Relative 2 %      Basophils Relative 1 %      Neutrophils Absolute 3 74 Thousands/µL      Immature Grans Absolute 0 01 Thousand/uL      Lymphocytes Absolute 3 06 Thousands/µL      Monocytes Absolute 0 43 Thousand/µL      Eosinophils Absolute 0 11 Thousand/µL      Basophils Absolute 0 04 Thousands/µL           All labs reviewed and utilized in the medical decision making process    Radiology:    XR chest 1 view portable   Final Result      No acute cardiopulmonary disease                    Workstation performed: [id number]             All radiology studies independently viewed by me and interpreted by the radiologist     Procedure    Procedures        FINAL IMPRESSION    Final diagnoses:   Nonintractable headache, unspecified chronicity pattern, unspecified headache type         DISPOSITION    Time reflects when diagnosis was documented in both MDM as applicable and the Disposition within this note     Time User Action Codes Description Comment    8/13/2022  9:17 PM Melba Castillo Add [R51 9] Nonintractable headache, unspecified chronicity pattern, unspecified headache type       ED Disposition     ED Disposition   Discharge    Condition   Stable    Date/Time   Sat Aug 13, 2022  9:17 PM    Comment   Betty Arroyo discharge to home/self care  Follow-up Information     Follow up With Specialties Details Why Contact Info    Vesna Cesar PA-C Family Medicine, Physician Assistant Schedule an appointment as soon as possible for a visit in 3 days  [address]  [address]               PATIENT REFERRED TO:    Vesna Cesar PA-C  [address]  [address]     Schedule an appointment as soon as possible for a visit in 3 days        DISCHARGE MEDICATIONS:    Discharge Medication List as of 8/13/2022  9:18 PM      CONTINUE these medications which have NOT CHANGED    Details   PROAIR  (90 Base) MCG/ACT inhaler inhale 1 puff by mouth every 6 hours if needed, Historical Med      SUMAtriptan (IMITREX) 50 mg tablet take 1 tablet by mouth if needed AT ONSET OF HEADACHE may repeat in 2 hours IF headache PERSISTS maximum daily dose of 2 tablets ( 100 milligrams ) every 24 hours, Normal             No discharge procedures on file  Ian Longoria DO        This note was partially completed using voice recognition technology, and was scanned for gross errors; however some errors may still exist  Please contact the author with any questions or requests for clarification        Ian Longoria DO  08/14/22 [id number]

## 2022-08-14 LAB
ATRIAL RATE: 70 BPM
P AXIS: 44 DEGREES
PR INTERVAL: 136 MS
QRS AXIS: 53 DEGREES
QRSD INTERVAL: 86 MS
QT INTERVAL: 384 MS
QTC INTERVAL: 414 MS
T WAVE AXIS: 63 DEGREES
VENTRICULAR RATE: 70 BPM

## 2022-08-14 PROCEDURE — 93010 ELECTROCARDIOGRAM REPORT: CPT | Performed by: INTERNAL MEDICINE

## 2022-11-14 ENCOUNTER — VBI (OUTPATIENT)
Dept: ADMINISTRATIVE | Facility: OTHER | Age: 32
End: 2022-11-14

## 2023-01-15 NOTE — DISCHARGE INSTRUCTIONS
Vaginal Delivery   WHAT YOU NEED TO KNOW:   A vaginal delivery occurs when your baby is born through your vagina (birth canal)  DISCHARGE INSTRUCTIONS:   Seek care immediately if:   · Your leg feels warm, tender, and painful  It may look swollen and red  · You have a fever  · You are urinating very little, or not at all  · You have heavy vaginal bleeding that fills 1 or more sanitary pads in 1 hour  · You feel weak, dizzy, or faint  Contact your healthcare provider if:   · Your abdominal or perineal pain does not go away, or gets worse  · You feel depressed  · You have questions or concerns about your condition or care  Medicines:  · NSAIDs , such as ibuprofen, help decrease swelling, pain, and fever  This medicine is available with or without a doctor's order  NSAIDs can cause stomach bleeding or kidney problems in certain people  If you take blood thinner medicine, always ask your healthcare provider if NSAIDs are safe for you  Always read the medicine label and follow directions  · Stool softeners  make it easier for you to have a bowel movement  You may need this medicine to treat or prevent constipation  · Take your medicine as directed  Contact your healthcare provider if you think your medicine is not helping or if you have side effects  Tell him or her if you are allergic to any medicine  Keep a list of the medicines, vitamins, and herbs you take  Include the amounts, and when and why you take them  Bring the list or the pill bottles to follow-up visits  Carry your medicine list with you in case of an emergency  Follow up with your healthcare provider:  Most women need to return 6 weeks after a vaginal delivery  Ask your healthcare provider how to care for your wounds or stitches, if you have them  Write down your questions so you remember to ask them during your visits  Activity:  Rest as much as possible  Try to keep all activities short   You may be able to do some exercise soon after you have your baby  Talk with your healthcare provider before you start exercising  If you work outside the home, ask when you can return to your job  Kegel exercises:  Kegel exercises may help your vaginal and rectal muscles heal faster  You can do Kegel exercises by tightening and relaxing the muscles around your vagina  Kegel exercises help make the muscles stronger  Breast care:  When your milk comes in, your breasts may feel full and hard  Ask how to care for your breasts, even if you are not breastfeeding  Constipation:  You may have constipation for a period of time after you have your baby  Do not try to push the bowel movement out if it is too hard  High-fiber foods and extra liquids can help you prevent constipation  Examples of high-fiber foods are fruit and bran  Prune juice and water are good liquids to drink  You may also be told to take over-the-counter fiber and stool softener medicines  Take these items as directed  Ask how to prevent or treat hemorrhoids  Perineum care: Your perineum is the area between your vagina and anus  Keep the area clean and dry  This will help it heal and prevent infection  Wash the area gently with soap and water when you bathe or shower  Rinse your perineum with warm water after you urinate or have a bowel movement  Your healthcare provider may suggest you use a warm sitz bath to help decrease pain  To take a sitz bath, fill a bathtub with 4 to 6 inches of warm water  You may also use a sitz bath pan that fits inside the toilet  Sit in the sitz bath for 20 minutes  Do this 2 to 3 times a day, or as directed  The warm water can help decrease pain and swelling  Vaginal discharge: You will have vaginal discharge, called lochia, after your delivery  The lochia is red or dark brown with clots for 1 to 3 days after the birth  The amount will decrease and turn pale pink or brown for 3 to 10 days  It will turn white or yellow on the 10th or 14th day  Lochia is usually gone within 3 weeks  Use a sanitary pad rather than a tampon to prevent a vaginal infection  You will have lochia for up to 3 weeks after your baby is born  Monthly periods: Your period may start again within 7 to 9 weeks after your baby is born  If you are breastfeeding, it may take longer for your period to start again  You can still get pregnant again even though you do not have your monthly period  Talk with your healthcare provider about a birth control method if you do not want to get pregnant  Mood changes: Many new mothers have some kind of mood changes after delivery  Some of these changes occur because of lack of sleep, hormone changes, and caring for a new baby  Some mood changes can be more serious, such as postpartum depression  Talk with your healthcare provider if you feel unable to care for yourself or your baby  Sexual activity:  Do not have sex until your healthcare provider says it is okay  You may notice you have a decreased desire for sex, or sex may be painful  You may need to use a vaginal lubricant (gel) to help make sex more comfortable  © 2017 [facility] Information is for End User's use only and may not be sold, redistributed or otherwise used for commercial purposes  All illustrations and images included in CareNotes® are the copyrighted property of A D A M , Inc  or Peter Reynolds  The above information is an  only  It is not intended as medical advice for individual conditions or treatments  Talk to your doctor, nurse or pharmacist before following any medical regimen to see if it is safe and effective for you  The patient is a 48y Female complaining of dizziness.

## 2023-02-07 ENCOUNTER — ULTRASOUND (OUTPATIENT)
Dept: OBGYN CLINIC | Facility: CLINIC | Age: 33
End: 2023-02-07

## 2023-02-07 VITALS
WEIGHT: 228.6 LBS | HEIGHT: 69 IN | DIASTOLIC BLOOD PRESSURE: 78 MMHG | SYSTOLIC BLOOD PRESSURE: 118 MMHG | BODY MASS INDEX: 33.86 KG/M2

## 2023-02-07 DIAGNOSIS — N91.1 SECONDARY AMENORRHEA: Primary | ICD-10-CM

## 2023-02-07 NOTE — PROGRESS NOTES
Early US  Dates confirmed    Patient having rare nausea  New onset SOB  Discussed PCP visit given early stage of pregnancy not likely contributing

## 2023-02-17 ENCOUNTER — INITIAL PRENATAL (OUTPATIENT)
Dept: OBGYN CLINIC | Facility: CLINIC | Age: 33
End: 2023-02-17

## 2023-02-17 VITALS — WEIGHT: 228 LBS | BODY MASS INDEX: 33.77 KG/M2 | HEIGHT: 69 IN

## 2023-02-17 DIAGNOSIS — Z34.81 PRENATAL CARE, SUBSEQUENT PREGNANCY, FIRST TRIMESTER: ICD-10-CM

## 2023-02-17 NOTE — PROGRESS NOTES
OB INTAKE INTERVIEW  Patient is 32 y o y o  who presents for OB intake at 10wks  She is accompanied by: herself  The father of her baby Dev Emmanuel) is involved in the pregnancy and is 40years old    Last Menstrual Period: 2022  Ultrasound: Measured 8 weeks 5 days on 2023 by [facility]  Estimated Date of Delivery: 2023 confirmed by 8 week US    Signs/Symptoms of Pregnancy  Current pregnancy symptoms: early nausea better  Constipation no  Headaches no  Cramping/spotting no  PICA cravings no    Diabetes-  Body mass index is 33 67 kg/m²  If patient has 1 or more, please order early 1 hour GTT  History of GDM no  BMI >35 no  History of PCOS or current metformin use no  History of LGA/macrosomic infant (4000g/9lbs) no    If patient has 2 or more, please order early 1 hour GTT  BMI>30 YES  AMA no  First degree relative with type 2 diabetes YES  History of chronic HTN, hyperlipidemia, elevated A1C no  High risk race (, , ,  or ) no    Hypertension- if you answer yes, please order preeclampsia labs (cbc, comprehensive metabolic panel, urine protein creatinine ratio, uric acid)  History of of chronic HTN no  History of gestational HTN no  History of preeclampsia, eclampsia, or HELLP syndrome no  History of diabetes no  History of lupus, autoimmune disease, kidney disease no    Thyroid- if yes order TSH with reflex T4  History of thyroid disease no    Bleeding Disorder or Hx of DVT-patient or first degree relative with history of  Order the following if not done previously     (Factor V, antithrombin III, prothrombin gene mutation, protein C and S Ag, lupus anticoagulant, anticardiolipin, beta-2 glycoprotein)   no    OB/GYN-  History of abnormal pap smear no  History of HPV no  History of Herpes/HSV no  History of other STI (gonorrhea, chlamydia, trich) no  History of prior  YESx1  History of prior  no  History of  delivery prior to 39 weeks 6 days no  History of blood transfusion no  Ok for blood transfusion YES    Substance screening- if yes outside of tobacco for her or anyone in her home-order urine drug screen  History of tobacco use YES quit 2018  Currently using tobacco no  Currently using alcohol no  Presently using drugs no  Past drug use  YES- marijuana  IV drug use-If yes add Hep C antibody to labs no  Partner drug use no  Parent/Family drug use YES- sister one passed  MRSA Screening-   Does the pt have a hx of MRSA? no  If yes- please follow MRSA protocol and obtain a nasal swab for MRSA culture    Immunizations:  Influenza vaccine given this season NO  Discussed Tdap vaccine YES  Discussed COVID Vaccine NO    Genetic/MFM-  Do you or your partner have a history of any of the following in yourselves or first degree relatives? Cystic fibrosis no  Spinal muscular atrophy no  Hemoglobinopathy/Sickle Cell/Thalassemia no  Fragile X Intellectual Disability no    If yes, discuss carrier screening and recommend consultation with [facility]/genetic counseling  If no, discuss option for carrier screening and/or genetic testing with Nuchal Ultrasound  Patient interested YES  Appointment at [facility] made YES- NT is 3/6 and level 2 is 4/25    Interview education  [facility] Ess[facility] Book reviewed and discussed YES    Nurse/Family Partnership- patient may qualify NO; referral placed NO    Prenatal lab work scripts YES  Extra labs ordered:  Drug screen    The patient has a history now or in prior pregnancy notable for:  none      Details that I feel the provider should be aware of: Nancie Lima and Melani Godoy are expecting their 2nd baby together with ANGIE( he has 3 other boys)  She delivered at [facility] before  She is doing pretty well- no complaints  PN1 visit scheduled  The patient was oriented to our practice, reviewed delivering physicians and LSEO for Delivery  All questions were answered      Interviewed by: João Bowers MA

## 2023-02-17 NOTE — PATIENT INSTRUCTIONS
Congratulations!! Please review our Pregnancy Essential Guide and [employer] L&D Virtual tour from our [employer]  [facility]  [facility] ([facility])     [facility] (Adrianna popexpert  [facility])

## 2023-02-21 ENCOUNTER — TELEPHONE (OUTPATIENT)
Dept: PERINATAL CARE | Facility: OTHER | Age: 33
End: 2023-02-21

## 2023-02-21 NOTE — TELEPHONE ENCOUNTER
Spoke with patient and confirmed her MFM appointment had to be rescheduled to 3/6/23 at 8:30 a m  Patient verbalized understanding of new time, date and location of appointment  Patient denies further questions

## 2023-02-25 ENCOUNTER — APPOINTMENT (OUTPATIENT)
Dept: LAB | Facility: CLINIC | Age: 33
End: 2023-02-25

## 2023-02-25 DIAGNOSIS — E80.6 HYPERBILIRUBINEMIA: ICD-10-CM

## 2023-02-25 DIAGNOSIS — Z34.81 PRENATAL CARE, SUBSEQUENT PREGNANCY, FIRST TRIMESTER: ICD-10-CM

## 2023-02-25 LAB
ABO GROUP BLD: NORMAL
BASOPHILS # BLD AUTO: 0.03 THOUSANDS/ÂΜL (ref 0–0.1)
BASOPHILS NFR BLD AUTO: 1 % (ref 0–1)
BILIRUB DIRECT SERPL-MCNC: 0.1 MG/DL (ref 0–0.2)
BILIRUB SERPL-MCNC: 0.35 MG/DL (ref 0.2–1)
BLD GP AB SCN SERPL QL: NEGATIVE
EOSINOPHIL # BLD AUTO: 0.07 THOUSAND/ÂΜL (ref 0–0.61)
EOSINOPHIL NFR BLD AUTO: 1 % (ref 0–6)
ERYTHROCYTE [DISTWIDTH] IN BLOOD BY AUTOMATED COUNT: 12.3 % (ref 11.6–15.1)
HBV SURFACE AG SER QL: NORMAL
HCT VFR BLD AUTO: 37.7 % (ref 34.8–46.1)
HCV AB SER QL: NORMAL
HGB BLD-MCNC: 12.5 G/DL (ref 11.5–15.4)
IMM GRANULOCYTES # BLD AUTO: 0.02 THOUSAND/UL (ref 0–0.2)
IMM GRANULOCYTES NFR BLD AUTO: 0 % (ref 0–2)
LYMPHOCYTES # BLD AUTO: 1.62 THOUSANDS/ÂΜL (ref 0.6–4.47)
LYMPHOCYTES NFR BLD AUTO: 24 % (ref 14–44)
MCH RBC QN AUTO: 28.7 PG (ref 26.8–34.3)
MCHC RBC AUTO-ENTMCNC: 33.2 G/DL (ref 31.4–37.4)
MCV RBC AUTO: 87 FL (ref 82–98)
MONOCYTES # BLD AUTO: 0.29 THOUSAND/ÂΜL (ref 0.17–1.22)
MONOCYTES NFR BLD AUTO: 4 % (ref 4–12)
NEUTROPHILS # BLD AUTO: 4.6 THOUSANDS/ÂΜL (ref 1.85–7.62)
NEUTS SEG NFR BLD AUTO: 70 % (ref 43–75)
NRBC BLD AUTO-RTO: 0 /100 WBCS
PLATELET # BLD AUTO: 295 THOUSANDS/UL (ref 149–390)
PMV BLD AUTO: 9.7 FL (ref 8.9–12.7)
RBC # BLD AUTO: 4.35 MILLION/UL (ref 3.81–5.12)
RH BLD: POSITIVE
RUBV IGG SERPL IA-ACNC: >175 IU/ML
WBC # BLD AUTO: 6.63 THOUSAND/UL (ref 4.31–10.16)

## 2023-02-26 LAB
HIV 1+2 AB+HIV1 P24 AG SERPL QL IA: NORMAL
HIV 2 AB SERPL QL IA: NORMAL
HIV1 AB SERPL QL IA: NORMAL
HIV1 P24 AG SERPL QL IA: NORMAL
TREPONEMA PALLIDUM IGG+IGM AB [PRESENCE] IN SERUM OR PLASMA BY IMMUNOASSAY: NORMAL

## 2023-02-27 LAB
AMPHETAMINES UR QL SCN: NEGATIVE NG/ML
BARBITURATES UR QL SCN: NEGATIVE NG/ML
BENZODIAZ UR QL: NEGATIVE NG/ML
BZE UR QL: NEGATIVE NG/ML
CANNABINOIDS UR QL SCN: NEGATIVE NG/ML
METHADONE UR QL SCN: NEGATIVE NG/ML
OPIATES UR QL: NEGATIVE NG/ML
PCP UR QL: NEGATIVE NG/ML
PROPOXYPH UR QL SCN: NEGATIVE NG/ML

## 2023-02-28 PROBLEM — Z3A.12 12 WEEKS GESTATION OF PREGNANCY: Status: ACTIVE | Noted: 2023-02-28

## 2023-02-28 PROBLEM — Z01.419 ENCOUNTER FOR ANNUAL ROUTINE GYNECOLOGICAL EXAMINATION: Status: RESOLVED | Noted: 2020-06-08 | Resolved: 2023-02-28

## 2023-02-28 PROBLEM — Z34.81 PRENATAL CARE, SUBSEQUENT PREGNANCY IN FIRST TRIMESTER: Status: ACTIVE | Noted: 2023-02-28

## 2023-02-28 PROBLEM — Z00.00 ANNUAL PHYSICAL EXAM: Status: RESOLVED | Noted: 2020-06-08 | Resolved: 2023-02-28

## 2023-02-28 NOTE — PATIENT INSTRUCTIONS
Pregnancy at 15 to 18 Weeks   AMBULATORY CARE:   What changes are happening to your body:  Now that you are in your second trimester, you have more energy  You may also feel hungrier than usual  You may start to experience other symptoms, such as heartburn or dizziness  You may be gaining about ½ to 1 pound a week, and your pregnancy is beginning to show  You may need to start wearing maternity clothes  Seek care immediately if:   You have pain or cramping in your abdomen or low back  You have heavy vaginal bleeding or clotting  You pass material that looks like tissue or large clots  Collect the material and bring it with you  Call your doctor or obstetrician if:   You cannot keep food or drinks down, and you are losing weight  You have light bleeding  You have chills or a fever  You have vaginal itching, burning, or pain  You have yellow, green, white, or foul-smelling vaginal discharge  You have pain or burning when you urinate, less urine than usual, or pink or bloody urine  You have questions or concerns about your condition or care  How to care for yourself at this stage of your pregnancy:       Manage heartburn  by eating 4 or 5 small meals each day instead of large meals  Avoid spicy foods  Avoid eating right before bedtime  Manage nausea and vomiting  Avoid fatty and spicy foods  Eat small meals throughout the day instead of large meals  Malathi may help to decrease nausea  Ask your healthcare provider about other ways of decreasing nausea and vomiting  Eat a variety of healthy foods  Healthy foods include fruits, vegetables, whole-grain breads, low-fat dairy foods, beans, lean meats, and fish  Drink liquids as directed  Ask how much liquid to drink each day and which liquids are best for you  Limit caffeine to less than 200 milligrams each day  Limit your intake of fish to 2 servings each week   Choose fish low in mercury such as canned light tuna, shrimp, salmon, cod, or tilapia  Do not  eat fish high in mercury such as swordfish, tilefish, chele mackerel, and shark  Take prenatal vitamins as directed  Your need for certain vitamins and minerals, such as folic acid, increases during pregnancy  Prenatal vitamins provide some of the extra vitamins and minerals you need  Prenatal vitamins may also help to decrease the risk of certain birth defects  Do not smoke  Smoking increases your risk of a miscarriage and other health problems during your pregnancy  Smoking can cause your baby to be born too early or weigh less at birth  Ask your healthcare provider for information if you need help quitting  Do not drink alcohol  Alcohol passes from your body to your baby through the placenta  It can affect your baby's brain development and cause fetal alcohol syndrome (FAS)  FAS is a group of conditions that causes mental, behavior, and growth problems  Talk to your healthcare provider before you take any medicines  Many medicines may harm your baby if you take them when you are pregnant  Do not take any medicines, vitamins, herbs, or supplements without first talking to your healthcare provider  Never use illegal or street drugs (such as marijuana or cocaine) while you are pregnant  Safety tips during pregnancy:   Avoid hot tubs and saunas  Do not use a hot tub or sauna while you are pregnant, especially during your first trimester  Hot tubs and saunas may raise your baby's temperature and increase the risk of birth defects  Avoid toxoplasmosis  This is an infection caused by eating raw meat or being around infected cat feces  It can cause birth defects, miscarriages, and other problems  Wash your hands after you touch raw meat  Make sure any meat is well-cooked before you eat it  Avoid raw eggs and unpasteurized milk  Use gloves or ask someone else to clean your cat's litter box while you are pregnant      Changes that are happening with your baby:  By 14 weeks, your baby may be about 6 inches long from the top of the head to the rump (baby's bottom)  Your baby may weigh about 11 ounces  You may be able to feel your baby's movement at about 18 weeks or later  The first movements may not be that noticeable  They may feel like a fluttering sensation  Your baby also makes sucking movements and can hear certain sounds  What you need to know about prenatal care:  During the first 28 weeks of your pregnancy, you will see your healthcare provider once a month  Your healthcare provider will check your blood pressure and weight  You may also need any of the following:  A urine test  may also be done to check for sugar and protein  These can be signs of gestational diabetes or infection  A blood test  may be done to check for anemia (low iron level)  Fundal height check  is a measurement of your uterus to check your baby's growth  This number is usually the same as the number of weeks that you have been pregnant  An ultrasound  may be done to check your baby's development  Your healthcare provider may be able to tell you what your baby's gender is during the ultrasound  Your baby's heart rate  will be checked  © Copyright Elizabeth Punch 2022 Information is for End User's use only and may not be sold, redistributed or otherwise used for commercial purposes  The above information is an  only  It is not intended as medical advice for individual conditions or treatments  Talk to your doctor, nurse or pharmacist before following any medical regimen to see if it is safe and effective for you  Valuable Online Resource:    [facility] pregnancy essential guide    http://emy jay/      On the right side of the screen is a 50 page guide providing valuable information about your entire pregnancy      On the left hand side of the site you will see several other links to great information and resources that [facility] offers     If you click on the tab that says "Pregnancy and Birth Packet" this opens another  guide to labor and delivery information as well as breast feeding information,  care, pediatricians, car seat safety and much more     The [facility] and [address] tab has a virtual tour of the new L&D unit, as well as valuable information about classes that are offered, breast feeding support, support groups and much more  I highly recommend the virtual Breast Feeding class, very informational even if you have breast fed in the past  Check for available dates ! Click around and enjoy all we have to offer! Please note that all information in regards to locations and visiting hours have not been updated due to [address] [place] [place] delivery location is [address] [place] [address],[address] Contractions   AMBULATORY CARE:   Toshia Gold contractions  are tightening and squeezing of the muscles of your uterus (womb) during pregnancy  The uterine muscles control the uterus  Renick Hanna contractions stop on their own  They are not true labor contractions and do not cause your cervix (opening to your uterus) to dilate (open)  Common symptoms include the following:   Pain or discomfort in your groin or lower abdomen that comes and goes    Your contractions are short, and do not last longer each time they happen    Your contractions do not get closer together each time    Your contractions do not get stronger or more painful each time    Your contractions stop when you change your position or rest    Seek care immediately if:   You have bleeding from your vagina  You have fluid leaking from your vagina that does not stop  You feel a gush of fluid from your vagina  Your contractions happen every 5 minutes or sooner, and last for more than 60 seconds  Your contractions begin to feel stronger or more painful      You feel a change in your baby's movement, or you feel fewer than 6 to 10 movements in an hour  Call your doctor or obstetrician if:   You have a fever  You have questions or concerns about your condition or care  Treatment for Kleberg Hanna contractions  may include pain medicine to relieve discomfort or pain or sedatives to relax the muscles of your uterus  If you are dehydrated, he or she may give you fluids through an IV or tell you to drink liquids  Self-care:   Change your activity or your position  when you feel contractions begin  Walk if you have been lying or sitting  Lie down if you have been standing or walking  True labor will not stop by changing your position or activity  Take a warm bath  to relax your body  Drink more liquids  to prevent dehydration  Ask how much liquid to drink each day and which liquids are best for you  Practice your labor breathing  to decrease your discomfort  This may help you get ready for true labor  Take slow, deep breaths, or fast, short breaths  Ask your healthcare provider how to practice labor breathing  Follow up with your doctor or obstetrician as directed:  Write down your questions so you remember to ask them during your visits  © Copyright Timoteo Burns 2022 Information is for End User's use only and may not be sold, redistributed or otherwise used for commercial purposes  The above information is an  only  It is not intended as medical advice for individual conditions or treatments  Talk to your doctor, nurse or pharmacist before following any medical regimen to see if it is safe and effective for you  Round Ligament Pain   WHAT YOU NEED TO KNOW:   What is round ligament pain? Round ligament pain is caused when ligaments are stretched as your uterus (womb) gets bigger during pregnancy  Round ligaments are found on each side of your uterus  They are bands of tissue that hold the uterus in place  Round ligament pain happens most often during the second trimester   It is a normal part of pregnancy and should stop by the third trimester  The pain is not serious and will not hurt your baby  What are the signs and symptoms of round ligament pain? Pain on one or both sides of your lower abdomen or groin that may move up to your hip    Spasms in the muscles in your abdomen    Pain that lasts a few seconds    Pain that happens when you exercise, sneeze, change positions, or stand quickly    How is round ligament pain diagnosed? Your healthcare provider will examine you and ask about your pain  Tell the provider when the pain started, and if you feel it on one or both sides  Your provider may ask if anything helps the pain or makes it worse  What can I do to manage my pain? Round ligament pain does not need to be treated  The following may help make you more comfortable:  Rest as often as you can  Rest can help relieve round ligament pain  You might want to lie on the side that has pain  Place a pillow under your abdomen  Keep another pillow between your knees  Move slowly  Sudden movement can stretch the ligaments and cause pain  Stand, sit, and change positions slowly  Try to tighten the muscles in your hips before you sneeze or laugh  You can also sit down and bring your knees up toward your abdomen  This can help relieve tension on the ligaments  Exercise as directed  Gentle exercise can keep the ligaments loose and strengthen core (abdominal) muscles  An example is swimming, or a yoga program designed for pregnancy  Ask your healthcare provider which exercises are safe for you and how often to exercise  For most healthy women, a good goal is to try to get at least 30 minutes of exercise every day  If activity causes pain, try not to walk too long or too far at one time  Break your exercise up into short amounts  Apply a warm compress to the area  Warmth can relieve pain and muscle spasms   Ask your healthcare provider if you can take a warm bath or use a heating pad  Keep all heat settings low  High heat can be dangerous for your baby  Do not sit in a hot tub or use hot water in your bath  You may also be able to massage the area gently while you are applying heat  Massage can help relieve pain  Ask about pain medicines  Ask your healthcare provider before you take any medicine during pregnancy, including over-the-counter pain medicines  Your healthcare provider may recommend acetaminophen to relieve the pain  Ask how much to take and how often to take it  Follow directions  Acetaminophen can cause liver damage  Too much medicine can be harmful to your baby  When should I contact my healthcare provider? You have pain that is spreading to other parts of your body  You have new or worsening pain  You have pain that lasts longer than a few minutes at a time  You have questions or concerns about your condition or care  CARE AGREEMENT:   You have the right to help plan your care  Learn about your health condition and how it may be treated  Discuss treatment options with your healthcare providers to decide what care you want to receive  You always have the right to refuse treatment  The above information is an  only  It is not intended as medical advice for individual conditions or treatments  Talk to your doctor, nurse or pharmacist before following any medical regimen to see if it is safe and effective for you  © Copyright [employer] 2022 Information is for End User's use only and may not be sold, redistributed or otherwise used for commercial purposes

## 2023-02-28 NOTE — PROGRESS NOTES
[id number]  Vaginal delivery   Pap 01/23/2019  Negative  GC/CT:  PN1 Labs: 2/25/2023  Blood Type: A   Positive :   MFM Level 1: schedule for nuchal on 3/6/2023  MFM Level 2: schedule for 4/25/2023 for level 2 ultra sound   AFP:   28 Week Labs:  TDap:  Flu:  GBS:   Blue Folder: Given at today's visit and reviewed with patient   Yellow Folder:  Ped Referral :  Breast pump:  L&D forms:  Delivery consent:     Patient is 12 weeks 1 day at today's visit   Patient reports that she is doing well so far in the pregnancy she has no concerns at today's visit

## 2023-02-28 NOTE — ASSESSMENT & PLAN NOTE
Cornell Romo is a 29y o  year old  at 12w1d who presents for initial prenatal visit  UnPlanned pregnancy, adjusting to idea, is concerned about finances   TAMYB Wesley Szymanski ( goes by Beto Diane)  She is a , desk work, advised need for frequent standing and stretching   Reports constant nausea, declines Zofran today, no vomiting,  Denies pelvic pain, bleeding, LOF  Exam WNL  Prenatal labs WNL  Still needs 1 hour Glucola, ordered, pt advised to have completed  Rh pos  2019 Neg,  Pap & GC/CT sent today  Patient Education: Patient was counseled regarding diet, exercise, weight gain, foods to avoid, vaccines in pregnancy, aneuploidy screening, travel precautions to include seat belt use and VTE risk reduction  She has been provided our pregnancy packet which includes pregnancy warning signs,how and when to contact providers, visit intervals, Maternal fetal medicine information, medication recommendations that are safe during pregnancy, dietary suggestions, activity recommendations, breastfeeding information as well as websites for additional information, and delivery location      Past Medical History:   Diagnosis Date   • Asthma     childhood asthma  inhaler prn   • Autism spectrum disorder    • Depression     diag with depression/anxiety age 27 -no meds now   • Migraine     diag 2 yrs ago- takes extra-strength tylenol   • Termination of pregnancy (fetus)    • Urinary tract infection     hx of  last episode    • Varicella     received vaccines     Past Surgical History:   Procedure Laterality Date   • NO PAST SURGERIES     • WISDOM TOOTH EXTRACTION  age 23     Immunization History   Administered Date(s) Administered   • H1N1, All Formulations 2009, 2009   • HPV Quadrivalent 10/12/2011, 2012   • INFLUENZA 10/06/2009, 10/12/2011, 2014, 10/28/2016   • Influenza, injectable, quadrivalent, preservative free 0 5 mL 2019   • Pneumococcal Polysaccharide PPV23 2014   • Tdap 2012, 2019   • Tuberculin Skin Test-PPD Intradermal 2013         Family History   Problem Relation Age of Onset   • Asthma Mother    • Bipolar disorder Mother    • Post-traumatic stress disorder Mother    • Diabetes Mother    • Anxiety disorder Father    • Depression Sister    • Drug abuse Sister    • No Known Problems Daughter    • Cancer Maternal Grandfather    • No Known Problems Paternal Grandmother    • No Known Problems Paternal Grandfather    • Depression Maternal Uncle    • Anxiety disorder Maternal Uncle    • Diabetes Paternal Aunt    • Breast cancer Family    • Colon cancer Neg Hx    • Ovarian cancer Neg Hx    • Uterine cancer Neg Hx    • Cervical cancer Neg Hx      Social History     Tobacco Use   • Smoking status: Former     Packs/day: 0 50     Years: 10 00     Pack years: 5 00     Types: Cigarettes     Quit date: 10/1/2018     Years since quittin 4   • Smokeless tobacco: Never   • Tobacco comments:     smokes 1 cigarette per dAY!    Vaping Use   • Vaping Use: Never used   Substance Use Topics   • Alcohol use: Not Currently     Comment: occasional-none since pregnancy   • Drug use: Not Currently     Types: Marijuana     Comment: Lst used 2018       Current Outpatient Medications:   •  Prenatal Vit w/Gi-Lpcwlfyyy-NZ (PNV PO), Take by mouth, Disp: , Rfl:   Patient Active Problem List    Diagnosis Date Noted   • Prenatal care, subsequent pregnancy in first trimester 2023   • 12 weeks gestation of pregnancy 2023   • Orthostatic hypotension 2022   • Raynaud's phenomenon without gangrene 2022   • H/O major depression 2019   • High-functioning autism spectrum disorder 08/10/2018   • Current moderate episode of major depressive disorder without prior episode ([facility] [address] ) 2018   • Mild intermittent asthma without complication        No Known Allergies    OB History    Para Term  AB Living   3 1 1 0 1 1   SAB IAB Ectopic Multiple Live Births   0 0 0 0 1      # Outcome Date GA Lbr Anibal/2nd Weight Sex Delivery Anes PTL Lv   3 Current            2 Term 08/04/19 41w6d  3572 g (7 lb 14 oz) F Vag-Spont EPI N JUDY   1 AB                Vitals:    03/01/23 0703   BP: 122/76   BP Location: Left arm   Patient Position: Sitting   Cuff Size: Large   Weight: 106 kg (234 lb)   Height: 5' 9" (1 753 m)     Body mass index is 34 56 kg/m²

## 2023-03-01 ENCOUNTER — INITIAL PRENATAL (OUTPATIENT)
Dept: OBGYN CLINIC | Facility: CLINIC | Age: 33
End: 2023-03-01

## 2023-03-01 VITALS
WEIGHT: 234 LBS | BODY MASS INDEX: 34.66 KG/M2 | HEIGHT: 69 IN | SYSTOLIC BLOOD PRESSURE: 122 MMHG | DIASTOLIC BLOOD PRESSURE: 76 MMHG

## 2023-03-01 DIAGNOSIS — Z11.3 SCREENING FOR STDS (SEXUALLY TRANSMITTED DISEASES): ICD-10-CM

## 2023-03-01 DIAGNOSIS — Z34.81 PRENATAL CARE, SUBSEQUENT PREGNANCY IN FIRST TRIMESTER: Primary | ICD-10-CM

## 2023-03-01 DIAGNOSIS — Z3A.12 12 WEEKS GESTATION OF PREGNANCY: ICD-10-CM

## 2023-03-01 LAB
SL AMB  POCT GLUCOSE, UA: NORMAL
SL AMB POCT URINE PROTEIN: NORMAL

## 2023-03-01 NOTE — PROGRESS NOTES
Problem   12 Weeks Gestation of Pregnancy    Blood Type: A  +       Pap 2019 Neg, Pap& GC/CT sent at PN1  PN1 Labs: normal , still needs 1 hr glucola, order placed     H&H: 12  7  28 Week Labs:  AFP:  Level 1:3/6/2023  Level 2:23  Tdap:  Flu:   GBS swab:     Blue folder: Given   Yellow folder:     Breast pump order:  L&D forms:    Delivery consent:   IOL:                12 weeks gestation of pregnancy  PN1  Jaci Mcgraw is a 29y o  year old  at 12w1d who presents for initial prenatal visit  UnPlanned pregnancy, adjusting to idea, is concerned about finances   FOB Stephanie Ok ( goes by Milena Gramajo)  She is a , desk work, advised need for frequent standing and stretching   Reports constant nausea, declines Zofran today, no vomiting,  Denies pelvic pain, bleeding, LOF  Exam WNL  Prenatal labs WNL  Still needs 1 hour Glucola, ordered, pt advised to have completed  Rh pos  2019 Neg,  Pap & GC/CT sent today  Patient Education: Patient was counseled regarding diet, exercise, weight gain, foods to avoid, vaccines in pregnancy, aneuploidy screening, travel precautions to include seat belt use and VTE risk reduction  She has been provided our pregnancy packet which includes pregnancy warning signs,how and when to contact providers, visit intervals, Maternal fetal medicine information, medication recommendations that are safe during pregnancy, dietary suggestions, activity recommendations, breastfeeding information as well as websites for additional information, and delivery location      Past Medical History:   Diagnosis Date   • Asthma     childhood asthma  inhaler prn   • Autism spectrum disorder    • Depression     diag with depression/anxiety age 27 -no meds now   • Migraine     diag 2 yrs ago- takes extra-strength tylenol   • Termination of pregnancy (fetus)    • Urinary tract infection     hx of  last episode    • Varicella     received vaccines     Past Surgical History: Procedure Laterality Date   • NO PAST SURGERIES     • WISDOM TOOTH EXTRACTION  age 23     Immunization History   Administered Date(s) Administered   • H1N1, All Formulations 2009, 2009   • HPV Quadrivalent 10/12/2011, 2012   • INFLUENZA 10/06/2009, 10/12/2011, 2014, 10/28/2016   • Influenza, injectable, quadrivalent, preservative free 0 5 mL 2019   • Pneumococcal Polysaccharide PPV23 2014   • Tdap 2012, 2019   • Tuberculin Skin Test-PPD Intradermal 2013         Family History   Problem Relation Age of Onset   • Asthma Mother    • Bipolar disorder Mother    • Post-traumatic stress disorder Mother    • Diabetes Mother    • Anxiety disorder Father    • Depression Sister    • Drug abuse Sister    • No Known Problems Daughter    • Cancer Maternal Grandfather    • No Known Problems Paternal Grandmother    • No Known Problems Paternal Grandfather    • Depression Maternal Uncle    • Anxiety disorder Maternal Uncle    • Diabetes Paternal Aunt    • Breast cancer Family    • Colon cancer Neg Hx    • Ovarian cancer Neg Hx    • Uterine cancer Neg Hx    • Cervical cancer Neg Hx      Social History     Tobacco Use   • Smoking status: Former     Packs/day: 0 50     Years: 10 00     Pack years: 5 00     Types: Cigarettes     Quit date: 10/1/2018     Years since quittin 4   • Smokeless tobacco: Never   • Tobacco comments:     smokes 1 cigarette per dAY!    Vaping Use   • Vaping Use: Never used   Substance Use Topics   • Alcohol use: Not Currently     Comment: occasional-none since pregnancy   • Drug use: Not Currently     Types: Marijuana     Comment: Lst used 2018       Current Outpatient Medications:   •  Prenatal Vit w/Hz-Gkcdvgpmx-AG (PNV PO), Take by mouth, Disp: , Rfl:   Patient Active Problem List    Diagnosis Date Noted   • Prenatal care, subsequent pregnancy in first trimester 2023   • 12 weeks gestation of pregnancy 2023   • Orthostatic hypotension 2022   • Raynaud's phenomenon without gangrene 2022   • H/O major depression 2019   • High-functioning autism spectrum disorder 08/10/2018   • Current moderate episode of major depressive disorder without prior episode ([facility] [address] ) 2018   • Mild intermittent asthma without complication        No Known Allergies    OB History    Para Term  AB Living   3 1 1 0 1 1   SAB IAB Ectopic Multiple Live Births   0 0 0 0 1      # Outcome Date GA Lbr Anibal/2nd Weight Sex Delivery Anes PTL Lv   3 Current            2 Term 19 41w6d  3572 g (7 lb 14 oz) F Vag-Spont EPI N JUDY   1 AB                Vitals:    23 0703   BP: 122/76   BP Location: Left arm   Patient Position: Sitting   Cuff Size: Large   Weight: 106 kg (234 lb)   Height: 5' 9" (1 753 m)     Body mass index is 34 56 kg/m²

## 2023-03-03 LAB
HPV HR 12 DNA CVX QL NAA+PROBE: NEGATIVE
HPV16 DNA CVX QL NAA+PROBE: NEGATIVE
HPV18 DNA CVX QL NAA+PROBE: NEGATIVE

## 2023-03-04 LAB
C TRACH DNA SPEC QL NAA+PROBE: NEGATIVE
N GONORRHOEA DNA SPEC QL NAA+PROBE: NEGATIVE

## 2023-03-06 ENCOUNTER — ROUTINE PRENATAL (OUTPATIENT)
Dept: PERINATAL CARE | Facility: OTHER | Age: 33
End: 2023-03-06

## 2023-03-06 VITALS
HEART RATE: 86 BPM | BODY MASS INDEX: 34.83 KG/M2 | WEIGHT: 235.2 LBS | HEIGHT: 69 IN | DIASTOLIC BLOOD PRESSURE: 76 MMHG | SYSTOLIC BLOOD PRESSURE: 120 MMHG

## 2023-03-06 DIAGNOSIS — Z36.82 ENCOUNTER FOR (NT) NUCHAL TRANSLUCENCY SCAN: ICD-10-CM

## 2023-03-06 DIAGNOSIS — O99.211 OBESITY AFFECTING PREGNANCY IN FIRST TRIMESTER: ICD-10-CM

## 2023-03-06 DIAGNOSIS — N91.1 SECONDARY AMENORRHEA: ICD-10-CM

## 2023-03-06 DIAGNOSIS — Z3A.12 12 WEEKS GESTATION OF PREGNANCY: Primary | ICD-10-CM

## 2023-03-06 NOTE — LETTER
March 6, 2023     Jose Manuel Yuan, [address]  [address] [address]    Patient: Nicolette Cheng   YOB: 1990   Date of Visit: 3/6/2023       Dear Dr Avtar Stanford:    Thank you for referring Nicolette Cheng to me for evaluation  Below are my notes for this consultation  If you have questions, please do not hesitate to call me  I look forward to following your patient along with you  Sincerely,        Raphael Barton MD        CC: No Recipients  Raphael Barton MD  3/6/2023 10:21 AM  Sign when Signing Visit  [address]: Ms King Anderson was seen today for nuchal translucency ultrasound  See ultrasound report under "OB Procedures" tab  Review of Systems   Constitutional: Negative for chills, fever and unexpected weight change  HENT: Negative for congestion, dental problem, facial swelling and sore throat  Eyes: Negative for visual disturbance  Respiratory: Negative for cough and shortness of breath  Cardiovascular: Negative for chest pain and palpitations  Gastrointestinal: Negative for diarrhea and vomiting  Endocrine: Negative for polydipsia  Genitourinary: Negative for dysuria and vaginal bleeding  Musculoskeletal: Negative for back pain and joint swelling  Skin: Negative for rash and wound  Allergic/Immunologic: Negative for immunocompromised state  Neurological: Negative for seizures and headaches  Hematological: Does not bruise/bleed easily  Psychiatric/Behavioral: Negative for dysphoric mood, hallucinations and suicidal ideas  Physical Exam  Constitutional:       General: She is not in acute distress  Appearance: Normal appearance  HENT:      Head: Normocephalic and atraumatic  Eyes:      Extraocular Movements: Extraocular movements intact  Cardiovascular:      Rate and Rhythm: Normal rate  Pulmonary:      Effort: Pulmonary effort is normal  No respiratory distress     Skin: Findings: No erythema or rash  Neurological:      Mental Status: She is alert and oriented to person, place, and time  Psychiatric:         Mood and Affect: Mood normal          Behavior: Behavior normal          Please don't hesitate to contact our office with any concerns or questions    -Hemanth Lo MD

## 2023-03-06 NOTE — PROGRESS NOTES
[address]: Ms Lanie Burns was seen today for nuchal translucency ultrasound  See ultrasound report under "OB Procedures" tab  Review of Systems   Constitutional: Negative for chills, fever and unexpected weight change  HENT: Negative for congestion, dental problem, facial swelling and sore throat  Eyes: Negative for visual disturbance  Respiratory: Negative for cough and shortness of breath  Cardiovascular: Negative for chest pain and palpitations  Gastrointestinal: Negative for diarrhea and vomiting  Endocrine: Negative for polydipsia  Genitourinary: Negative for dysuria and vaginal bleeding  Musculoskeletal: Negative for back pain and joint swelling  Skin: Negative for rash and wound  Allergic/Immunologic: Negative for immunocompromised state  Neurological: Negative for seizures and headaches  Hematological: Does not bruise/bleed easily  Psychiatric/Behavioral: Negative for dysphoric mood, hallucinations and suicidal ideas  Physical Exam  Constitutional:       General: She is not in acute distress  Appearance: Normal appearance  HENT:      Head: Normocephalic and atraumatic  Eyes:      Extraocular Movements: Extraocular movements intact  Cardiovascular:      Rate and Rhythm: Normal rate  Pulmonary:      Effort: Pulmonary effort is normal  No respiratory distress  Skin:     Findings: No erythema or rash  Neurological:      Mental Status: She is alert and oriented to person, place, and time  Psychiatric:         Mood and Affect: Mood normal          Behavior: Behavior normal          Please don't hesitate to contact our office with any concerns or questions    -Libby Lan MD

## 2023-03-07 ENCOUNTER — TELEPHONE (OUTPATIENT)
Dept: OBGYN CLINIC | Facility: CLINIC | Age: 33
End: 2023-03-07

## 2023-03-07 NOTE — TELEPHONE ENCOUNTER
got patient voicemail- advised to call [address]- that's where she was seen yesterday and they can order the testing

## 2023-03-07 NOTE — TELEPHONE ENCOUNTER
Pt declined genetic testing at yesterday's appt but changed her mind and now would like to have it done  Please call pt to advise of next steps

## 2023-03-08 LAB
LAB AP GYN PRIMARY INTERPRETATION: NORMAL
Lab: NORMAL

## 2023-03-11 ENCOUNTER — APPOINTMENT (OUTPATIENT)
Dept: LAB | Facility: CLINIC | Age: 33
End: 2023-03-11

## 2023-03-11 DIAGNOSIS — Z3A.12 12 WEEKS GESTATION OF PREGNANCY: ICD-10-CM

## 2023-03-11 DIAGNOSIS — Z34.81 PRENATAL CARE, SUBSEQUENT PREGNANCY IN FIRST TRIMESTER: ICD-10-CM

## 2023-03-11 LAB
BACTERIA UR QL AUTO: ABNORMAL /HPF
BILIRUB UR QL STRIP: NEGATIVE
CLARITY UR: CLEAR
COLOR UR: ABNORMAL
GLUCOSE 1H P 50 G GLC PO SERPL-MCNC: 112 MG/DL (ref 40–134)
GLUCOSE UR STRIP-MCNC: NEGATIVE MG/DL
HGB UR QL STRIP.AUTO: NEGATIVE
KETONES UR STRIP-MCNC: NEGATIVE MG/DL
LEUKOCYTE ESTERASE UR QL STRIP: NEGATIVE
MUCOUS THREADS UR QL AUTO: ABNORMAL
NITRITE UR QL STRIP: NEGATIVE
NON-SQ EPI CELLS URNS QL MICRO: ABNORMAL /HPF
PH UR STRIP.AUTO: 7 [PH]
PROT UR STRIP-MCNC: NEGATIVE MG/DL
RBC #/AREA URNS AUTO: ABNORMAL /HPF
SP GR UR STRIP.AUTO: 1.01 (ref 1–1.03)
UROBILINOGEN UR STRIP-ACNC: <2 MG/DL
WBC #/AREA URNS AUTO: ABNORMAL /HPF

## 2023-03-12 LAB — BACTERIA UR CULT: NORMAL

## 2023-03-13 ENCOUNTER — TELEPHONE (OUTPATIENT)
Dept: LABOR AND DELIVERY | Facility: HOSPITAL | Age: 33
End: 2023-03-13

## 2023-03-13 DIAGNOSIS — R82.71 GBS BACTERIURIA: Primary | ICD-10-CM

## 2023-03-13 LAB
BACTERIA UR CULT: ABNORMAL
BACTERIA UR CULT: ABNORMAL

## 2023-03-13 RX ORDER — CEPHALEXIN 500 MG/1
500 CAPSULE ORAL EVERY 6 HOURS SCHEDULED
Qty: 28 CAPSULE | Refills: 0 | Status: SHIPPED | OUTPATIENT
Start: 2023-03-13 | End: 2023-03-20

## 2023-03-13 NOTE — TELEPHONE ENCOUNTER
Spoke to pt and reviewed results and recommendations from their urine per  [facility]  abx sent- patient has no sx

## 2023-03-28 ENCOUNTER — OFFICE VISIT (OUTPATIENT)
Dept: PERINATAL CARE | Facility: OTHER | Age: 33
End: 2023-03-28

## 2023-03-28 ENCOUNTER — ROUTINE PRENATAL (OUTPATIENT)
Dept: OBGYN CLINIC | Facility: CLINIC | Age: 33
End: 2023-03-28

## 2023-03-28 VITALS
HEIGHT: 69 IN | SYSTOLIC BLOOD PRESSURE: 110 MMHG | BODY MASS INDEX: 35.78 KG/M2 | DIASTOLIC BLOOD PRESSURE: 74 MMHG | WEIGHT: 241.6 LBS

## 2023-03-28 DIAGNOSIS — Z36.9 ANTENATAL SCREENING ENCOUNTER: ICD-10-CM

## 2023-03-28 DIAGNOSIS — Z3A.16 16 WEEKS GESTATION OF PREGNANCY: Primary | ICD-10-CM

## 2023-03-28 DIAGNOSIS — R82.71 GBS BACTERIURIA: ICD-10-CM

## 2023-03-28 DIAGNOSIS — Z33.1 PREGNANT STATE, INCIDENTAL: ICD-10-CM

## 2023-03-28 DIAGNOSIS — Z3A.16 16 WEEKS GESTATION OF PREGNANCY: ICD-10-CM

## 2023-03-28 LAB
SL AMB  POCT GLUCOSE, UA: NEGATIVE
SL AMB POCT URINE PROTEIN: NEGATIVE

## 2023-03-28 RX ORDER — ASPIRIN 81 MG/1
162 TABLET ORAL DAILY
Qty: 60 TABLET | Refills: 5 | Status: SHIPPED | OUTPATIENT
Start: 2023-03-28

## 2023-03-28 NOTE — PATIENT INSTRUCTIONS
Pregnancy at 15 to 18 Weeks   AMBULATORY CARE:   What changes are happening to your body:  Now that you are in your second trimester, you have more energy  You may also feel hungrier than usual  You may start to experience other symptoms, such as heartburn or dizziness  You may be gaining about ½ to 1 pound a week, and your pregnancy is beginning to show  You may need to start wearing maternity clothes  Seek care immediately if:   You have pain or cramping in your abdomen or low back  You have heavy vaginal bleeding or clotting  You pass material that looks like tissue or large clots  Collect the material and bring it with you  Call your doctor or obstetrician if:   You cannot keep food or drinks down, and you are losing weight  You have light bleeding  You have chills or a fever  You have vaginal itching, burning, or pain  You have yellow, green, white, or foul-smelling vaginal discharge  You have pain or burning when you urinate, less urine than usual, or pink or bloody urine  You have questions or concerns about your condition or care  How to care for yourself at this stage of your pregnancy:       Manage heartburn  by eating 4 or 5 small meals each day instead of large meals  Avoid spicy foods  Avoid eating right before bedtime  Manage nausea and vomiting  Avoid fatty and spicy foods  Eat small meals throughout the day instead of large meals  Malathi may help to decrease nausea  Ask your healthcare provider about other ways of decreasing nausea and vomiting  Eat a variety of healthy foods  Healthy foods include fruits, vegetables, whole-grain breads, low-fat dairy foods, beans, lean meats, and fish  Drink liquids as directed  Ask how much liquid to drink each day and which liquids are best for you  Limit caffeine to less than 200 milligrams each day  Limit your intake of fish to 2 servings each week   Choose fish low in mercury such as canned light tuna, shrimp, salmon, cod, or tilapia  Do not  eat fish high in mercury such as swordfish, tilefish, chele mackerel, and shark  Take prenatal vitamins as directed  Your need for certain vitamins and minerals, such as folic acid, increases during pregnancy  Prenatal vitamins provide some of the extra vitamins and minerals you need  Prenatal vitamins may also help to decrease the risk of certain birth defects  Do not smoke  Smoking increases your risk of a miscarriage and other health problems during your pregnancy  Smoking can cause your baby to be born too early or weigh less at birth  Ask your healthcare provider for information if you need help quitting  Do not drink alcohol  Alcohol passes from your body to your baby through the placenta  It can affect your baby's brain development and cause fetal alcohol syndrome (FAS)  FAS is a group of conditions that causes mental, behavior, and growth problems  Talk to your healthcare provider before you take any medicines  Many medicines may harm your baby if you take them when you are pregnant  Do not take any medicines, vitamins, herbs, or supplements without first talking to your healthcare provider  Never use illegal or street drugs (such as marijuana or cocaine) while you are pregnant  Safety tips during pregnancy:   Avoid hot tubs and saunas  Do not use a hot tub or sauna while you are pregnant, especially during your first trimester  Hot tubs and saunas may raise your baby's temperature and increase the risk of birth defects  Avoid toxoplasmosis  This is an infection caused by eating raw meat or being around infected cat feces  It can cause birth defects, miscarriages, and other problems  Wash your hands after you touch raw meat  Make sure any meat is well-cooked before you eat it  Avoid raw eggs and unpasteurized milk  Use gloves or ask someone else to clean your cat's litter box while you are pregnant      Changes that are happening with your baby:  By 81 weeks, your baby may be about 6 inches long from the top of the head to the rump (baby's bottom)  Your baby may weigh about 11 ounces  You may be able to feel your baby's movement at about 18 weeks or later  The first movements may not be that noticeable  They may feel like a fluttering sensation  Your baby also makes sucking movements and can hear certain sounds  What you need to know about prenatal care:  During the first 28 weeks of your pregnancy, you will see your healthcare provider once a month  Your healthcare provider will check your blood pressure and weight  You may also need any of the following:  A urine test  may also be done to check for sugar and protein  These can be signs of gestational diabetes or infection  A blood test  may be done to check for anemia (low iron level)  Fundal height check  is a measurement of your uterus to check your baby's growth  This number is usually the same as the number of weeks that you have been pregnant  An ultrasound  may be done to check your baby's development  Your healthcare provider may be able to tell you what your baby's gender is during the ultrasound  Your baby's heart rate  will be checked  © Copyright [employer] 2022 Information is for End User's use only and may not be sold, redistributed or otherwise used for commercial purposes  The above information is an  only  It is not intended as medical advice for individual conditions or treatments  Talk to your doctor, nurse or pharmacist before following any medical regimen to see if it is safe and effective for you

## 2023-03-28 NOTE — PROGRESS NOTES
Patient is here for prenatal ob visit today  No question's or concerns at this time  GA: 16w0d  Estimated Date of Delivery: 23    Urine: Protein Negative / Glucose Negative  Denies loss of fluid, vaginal bleeding and contractions  Feels bubbles  Labs are completed and up to date

## 2023-03-28 NOTE — ASSESSMENT & PLAN NOTE
Marija Restrepo is a 29 y o  Ronnald Roulette 16w0d here for routine prenatal care  Prepregnancy BMI 33 65 with a goal weight gain 5 kg (11 lb)-9 kg (19 lb)  TWG 6 169 kg (13 lb 9 6 oz)  Feels well  Denies LOF/CTX/VB  No concerns  Starting to feel fetal flutters    Declined NIPT initially but is interested now  Will call M to arrange testing  msAFP ordered today  Discussed ASA for preE prevention  Will start today   labor precautions reviewed  Encouraged adequate hydration and nutrition  Pregnancy Essential guide and [facility] and Me [facility] web site recommended

## 2023-03-28 NOTE — PROGRESS NOTES
"Patient chose to have [facility] Non-invasive Prenatal Screen without fetal sex (per pt request)  Patient given brochure and is aware [facility] will contact their insurance and coordinate coverage  Patient made aware she will need to respond to text message or e-mail from [employer] within 2 business days or testing will be run through insurance  Patient informed text message will come from area code  \"415\"  Provided [employer] # [phone] and web site : [email or web address]  \"[place] your test online\" card with barcode and test tube ID provided to patient  Reviewed [facility]'s web site states 5-7 business days for results via their portal    [employer] message will be sent to patient when MFM receives results /provider reviews  2 vials of blood drawn from left arm by [employer]  Patient tolerated blood draw without difficulty  Specimens labeled with patient identifiers (name, date of birth, specimen collection date), order and specimen were verified with patient, packed and sent via [employer]  Copy of lab order scanned to Epic media  Maternal Fetal Medicine will have results in approximately 7-10 business days and will call patient or notify via [address]  Patient aware viewing lab result online will reveal fetal sex if ordered  Patient verbalized understanding of all instructions and no questions at this time      "

## 2023-03-28 NOTE — PROGRESS NOTES
Problem   Gbs Bacteriuria   16 Weeks Gestation of Pregnancy    Blood Type: A  +       Pap 2019 Neg, Pap& GC/CT sent at PN1  PN1 Labs: normal  H&H: 12  7  28 Week Labs:  AFP: pending   Level 1:3/6/2023  Level 2:23  Tdap:  Flu:   GBS swab:     Blue folder: Given   Yellow folder:     Breast pump order:  L&D forms:    Delivery consent:   IOL:                16 weeks gestation of pregnancy  Fidel Haider is a 29 y o  Helon Lux 16w0d here for routine prenatal care  Prepregnancy BMI 33 65 with a goal weight gain 5 kg (11 lb)-9 kg (19 lb)  TWG 6 169 kg (13 lb 9 6 oz)  Feels well  Denies LOF/CTX/VB  No concerns  Starting to feel fetal flutters    Declined NIPT initially but is interested now  Will call MFM to arrange testing  msAFP ordered today  Discussed ASA for preE prevention  Will start today   labor precautions reviewed  Encouraged adequate hydration and nutrition  Pregnancy Essential guide and Baby and Me center web site recommended  GBS bacteriuria  Urine culture + GBS  Antibiotics started  Will repeat urine culture next visit

## 2023-04-21 PROBLEM — Z87.59 HISTORY OF GESTATIONAL HYPERTENSION: Status: ACTIVE | Noted: 2023-04-21

## 2023-04-21 PROBLEM — O99.212 OBESITY AFFECTING PREGNANCY IN SECOND TRIMESTER: Status: ACTIVE | Noted: 2023-03-06

## 2023-04-21 PROBLEM — Z3A.19 19 WEEKS GESTATION OF PREGNANCY: Status: ACTIVE | Noted: 2023-02-28

## 2023-04-25 ENCOUNTER — ROUTINE PRENATAL (OUTPATIENT)
Dept: OBGYN CLINIC | Facility: CLINIC | Age: 33
End: 2023-04-25

## 2023-04-25 VITALS
BODY MASS INDEX: 37.09 KG/M2 | WEIGHT: 250.4 LBS | DIASTOLIC BLOOD PRESSURE: 80 MMHG | SYSTOLIC BLOOD PRESSURE: 130 MMHG | HEIGHT: 69 IN | HEART RATE: 89 BPM

## 2023-04-25 DIAGNOSIS — R82.71 GBS BACTERIURIA: ICD-10-CM

## 2023-04-25 DIAGNOSIS — Z87.59 HISTORY OF GESTATIONAL HYPERTENSION: ICD-10-CM

## 2023-04-25 DIAGNOSIS — O99.212 OBESITY AFFECTING PREGNANCY IN SECOND TRIMESTER: ICD-10-CM

## 2023-04-25 DIAGNOSIS — Z3A.20 20 WEEKS GESTATION OF PREGNANCY: Primary | ICD-10-CM

## 2023-04-25 LAB
SL AMB  POCT GLUCOSE, UA: NEGATIVE
SL AMB POCT URINE PROTEIN: NEGATIVE

## 2023-04-25 NOTE — PROGRESS NOTES
Prenatal Visit 20w0d    Patient denies any lof, vb or ctx  Patient is feeling flutters  Labs are done   AFP is neg   Patient urine is neg/neg  Patient has no concerns today

## 2023-04-25 NOTE — ASSESSMENT & PLAN NOTE
29 yoi  at 20+0 here for routine ob visit  Feeling well  No contractions, leaking or bleeding  Fetal flutters  Return in 4 wks

## 2023-04-25 NOTE — PROGRESS NOTES
History of gestational hypertension  Continue ASA to 36 wks    GBS bacteriuria  Treated in first tri  Will need antibiotics in labor  Obesity affecting pregnancy in second trimester  TWG 22#  Goal 11-20#     20 weeks gestation of pregnancy  29 yoi  at 20+0 here for routine ob visit  Feeling well  No contractions, leaking or bleeding  Fetal flutters  Return in 4 wks

## 2023-05-22 ENCOUNTER — ROUTINE PRENATAL (OUTPATIENT)
Dept: OBGYN CLINIC | Facility: CLINIC | Age: 33
End: 2023-05-22

## 2023-05-22 VITALS
DIASTOLIC BLOOD PRESSURE: 76 MMHG | SYSTOLIC BLOOD PRESSURE: 118 MMHG | HEART RATE: 86 BPM | BODY MASS INDEX: 37.8 KG/M2 | WEIGHT: 255.2 LBS | HEIGHT: 69 IN

## 2023-05-22 DIAGNOSIS — R82.71 GBS BACTERIURIA: ICD-10-CM

## 2023-05-22 DIAGNOSIS — F32.1 CURRENT MODERATE EPISODE OF MAJOR DEPRESSIVE DISORDER WITHOUT PRIOR EPISODE (HCC): ICD-10-CM

## 2023-05-22 DIAGNOSIS — Z11.3 SCREEN FOR STD (SEXUALLY TRANSMITTED DISEASE): ICD-10-CM

## 2023-05-22 DIAGNOSIS — O99.212 OBESITY AFFECTING PREGNANCY IN SECOND TRIMESTER: ICD-10-CM

## 2023-05-22 DIAGNOSIS — Z34.82 PRENATAL CARE, SUBSEQUENT PREGNANCY, SECOND TRIMESTER: Primary | ICD-10-CM

## 2023-05-22 DIAGNOSIS — Z87.59 HISTORY OF GESTATIONAL HYPERTENSION: ICD-10-CM

## 2023-05-22 LAB
SL AMB  POCT GLUCOSE, UA: NORMAL
SL AMB POCT URINE PROTEIN: NORMAL

## 2023-05-22 NOTE — PROGRESS NOTES
Patient presents for a routine prenatal visit    23W6D  Good Fetal Movement  No LOF,bleeding,discharge or cramping  No current complaints at this time       Nuchal u/s: 3/6  Anatomy u/s:   28 week labs ordered today    Urine: -/-

## 2023-05-22 NOTE — PROGRESS NOTES
29 y o  [id number] at 23w6d presents for routine prenatal visit  She denies contractions/leakage of fluid/vaginal bleeding  She feels good fetal movement     Problem List Items Addressed This Visit        Other    Current moderate episode of major depressive disorder without prior episode (HCC)  No mood concerns today    Obesity affecting pregnancy in second trimester  Weight gain 27# - discussed healthy diet, exercise    GBS bacteriuria  Repeat culture with 28 wk labs   Plan for PCN in labor    History of gestational hypertension  BP wnl today  Continue ASA   Other Visit Diagnoses     Prenatal care, subsequent pregnancy, second trimester    -  Primary  32 wk growth US scheduled  F/u in 4 wks or prn    Relevant Orders    Anemia Panel w/Reflex, OB    CBC and differential    Glucose, 1H PG    POCT urine dip (Completed)    Screen for STD (sexually transmitted disease)        Relevant Orders    RPR-Syphilis Screening (Total Syphilis IGG/IGM)

## 2023-06-17 ENCOUNTER — APPOINTMENT (OUTPATIENT)
Age: 33
End: 2023-06-17
Payer: COMMERCIAL

## 2023-06-17 DIAGNOSIS — Z11.3 SCREEN FOR STD (SEXUALLY TRANSMITTED DISEASE): ICD-10-CM

## 2023-06-17 DIAGNOSIS — R82.71 GBS BACTERIURIA: ICD-10-CM

## 2023-06-17 DIAGNOSIS — Z34.82 PRENATAL CARE, SUBSEQUENT PREGNANCY, SECOND TRIMESTER: ICD-10-CM

## 2023-06-17 LAB
BASOPHILS # BLD AUTO: 0.05 THOUSANDS/ÂΜL (ref 0–0.1)
BASOPHILS NFR BLD AUTO: 1 % (ref 0–1)
EOSINOPHIL # BLD AUTO: 0.14 THOUSAND/ÂΜL (ref 0–0.61)
EOSINOPHIL NFR BLD AUTO: 2 % (ref 0–6)
ERYTHROCYTE [DISTWIDTH] IN BLOOD BY AUTOMATED COUNT: 12.3 % (ref 11.6–15.1)
GLUCOSE 1H P 50 G GLC PO SERPL-MCNC: 156 MG/DL (ref 40–134)
HCT VFR BLD AUTO: 33 % (ref 34.8–46.1)
HGB BLD-MCNC: 11.2 G/DL (ref 11.5–15.4)
IMM GRANULOCYTES # BLD AUTO: 0.03 THOUSAND/UL (ref 0–0.2)
IMM GRANULOCYTES NFR BLD AUTO: 0 % (ref 0–2)
LYMPHOCYTES # BLD AUTO: 2.06 THOUSANDS/ÂΜL (ref 0.6–4.47)
LYMPHOCYTES NFR BLD AUTO: 24 % (ref 14–44)
MCH RBC QN AUTO: 29.2 PG (ref 26.8–34.3)
MCHC RBC AUTO-ENTMCNC: 33.9 G/DL (ref 31.4–37.4)
MCV RBC AUTO: 86 FL (ref 82–98)
MONOCYTES # BLD AUTO: 0.4 THOUSAND/ÂΜL (ref 0.17–1.22)
MONOCYTES NFR BLD AUTO: 5 % (ref 4–12)
NEUTROPHILS # BLD AUTO: 5.78 THOUSANDS/ÂΜL (ref 1.85–7.62)
NEUTS SEG NFR BLD AUTO: 68 % (ref 43–75)
NRBC BLD AUTO-RTO: 0 /100 WBCS
PLATELET # BLD AUTO: 306 THOUSANDS/UL (ref 149–390)
PMV BLD AUTO: 10 FL (ref 8.9–12.7)
RBC # BLD AUTO: 3.83 MILLION/UL (ref 3.81–5.12)
TREPONEMA PALLIDUM IGG+IGM AB [PRESENCE] IN SERUM OR PLASMA BY IMMUNOASSAY: NORMAL
WBC # BLD AUTO: 8.46 THOUSAND/UL (ref 4.31–10.16)

## 2023-06-17 PROCEDURE — 87086 URINE CULTURE/COLONY COUNT: CPT

## 2023-06-17 PROCEDURE — 86780 TREPONEMA PALLIDUM: CPT

## 2023-06-17 PROCEDURE — 82950 GLUCOSE TEST: CPT

## 2023-06-17 PROCEDURE — 36415 COLL VENOUS BLD VENIPUNCTURE: CPT

## 2023-06-17 PROCEDURE — 85025 COMPLETE CBC W/AUTO DIFF WBC: CPT

## 2023-06-19 ENCOUNTER — ROUTINE PRENATAL (OUTPATIENT)
Dept: OBGYN CLINIC | Facility: CLINIC | Age: 33
End: 2023-06-19
Payer: COMMERCIAL

## 2023-06-19 VITALS — WEIGHT: 257 LBS | BODY MASS INDEX: 37.95 KG/M2 | DIASTOLIC BLOOD PRESSURE: 80 MMHG | SYSTOLIC BLOOD PRESSURE: 128 MMHG

## 2023-06-19 DIAGNOSIS — O99.810 ABNORMAL GLUCOSE TOLERANCE TEST IN PREGNANCY: ICD-10-CM

## 2023-06-19 DIAGNOSIS — O99.212 OBESITY AFFECTING PREGNANCY IN SECOND TRIMESTER: ICD-10-CM

## 2023-06-19 DIAGNOSIS — Z23 NEED FOR TDAP VACCINATION: ICD-10-CM

## 2023-06-19 DIAGNOSIS — Z34.82 PRENATAL CARE, SUBSEQUENT PREGNANCY, SECOND TRIMESTER: Primary | ICD-10-CM

## 2023-06-19 PROBLEM — Z34.81 PRENATAL CARE, SUBSEQUENT PREGNANCY IN FIRST TRIMESTER: Status: RESOLVED | Noted: 2023-02-28 | Resolved: 2023-06-19

## 2023-06-19 PROBLEM — Z3A.27 27 WEEKS GESTATION OF PREGNANCY: Status: ACTIVE | Noted: 2023-02-28

## 2023-06-19 LAB
BACTERIA UR CULT: NORMAL
DME PARACHUTE DELIVERY DATE REQUESTED: NORMAL
DME PARACHUTE ITEM DESCRIPTION: NORMAL
DME PARACHUTE ORDER STATUS: NORMAL
DME PARACHUTE SUPPLIER NAME: NORMAL
DME PARACHUTE SUPPLIER PHONE: NORMAL
SL AMB  POCT GLUCOSE, UA: ABNORMAL
SL AMB POCT URINE PROTEIN: ABNORMAL

## 2023-06-19 PROCEDURE — 90715 TDAP VACCINE 7 YRS/> IM: CPT | Performed by: OBSTETRICS & GYNECOLOGY

## 2023-06-19 PROCEDURE — PNV: Performed by: OBSTETRICS & GYNECOLOGY

## 2023-06-19 PROCEDURE — 81002 URINALYSIS NONAUTO W/O SCOPE: CPT | Performed by: OBSTETRICS & GYNECOLOGY

## 2023-06-19 PROCEDURE — 90471 IMMUNIZATION ADMIN: CPT | Performed by: OBSTETRICS & GYNECOLOGY

## 2023-06-19 NOTE — PROGRESS NOTES
27 weeks gestation of pregnancy  Consent signed  3Hr GTT given    Signs of  labor, fetal kick counts, recommendations for hydration, healthy diet and exercise given  Problem   Obesity Affecting Pregnancy in Second Trimester    Serial growth  Elevated glucola, 3HR GTT pending   TWG @ 27 weeks 29lbs     27 Weeks Gestation of Pregnancy    Blood Type: A  +       Pap 2019 Neg, Pap& GC/CT sent at PN1  PN1 Labs: normal  H&H: 12 37 7  28 Week Labs:  AFP: pending   Level 1:3/6/2023  Level 2:23  Tdap:  Flu:   GBS swab:     Blue folder: Given   Yellow folder:     Breast pump order:  L&D forms:    Delivery consent:   IOL:              Prenatal Care, Subsequent Pregnancy in First Trimester (Resolved)         Patient is here for prenatal ob visit today  GA:27w6d  NORBETR: 23  [id number]  Urine: Protein trace / Glucose neg  Denies loss of fluid, vaginal bleeding and contractions  Good fetal movement  Patient is having a girl  Labs utd  -failed 1 hr glu  Ordered 3hr today  Yellow folder given and reviewed with pt today  Delivery consent to be signed today  Tdap given today  R deltoid  Tolerated well  VIS provided     Breast pump ordered today   Peds referral not needed

## 2023-06-19 NOTE — ASSESSMENT & PLAN NOTE
Consent signed  3Hr GTT given    Signs of  labor, fetal kick counts, recommendations for hydration, healthy diet and exercise given

## 2023-06-24 ENCOUNTER — APPOINTMENT (OUTPATIENT)
Age: 33
End: 2023-06-24
Payer: COMMERCIAL

## 2023-06-24 DIAGNOSIS — O99.810 ABNORMAL GLUCOSE TOLERANCE TEST IN PREGNANCY: ICD-10-CM

## 2023-06-29 ENCOUNTER — TELEPHONE (OUTPATIENT)
Dept: OBGYN CLINIC | Facility: CLINIC | Age: 33
End: 2023-06-29

## 2023-06-29 ENCOUNTER — APPOINTMENT (OUTPATIENT)
Dept: LAB | Facility: HOSPITAL | Age: 33
End: 2023-06-29
Payer: COMMERCIAL

## 2023-06-29 LAB
GLUCOSE 1H P 100 G GLC PO SERPL-MCNC: 184 MG/DL (ref 70–183)
GLUCOSE 2H P 100 G GLC PO SERPL-MCNC: 151 MG/DL (ref 70–155)
GLUCOSE 3H P 100 G GLC PO SERPL-MCNC: 73 MG/DL (ref 70–140)
GLUCOSE P FAST SERPL-MCNC: 91 MG/DL (ref 65–94)

## 2023-06-29 PROCEDURE — 82952 GTT-ADDED SAMPLES: CPT

## 2023-06-29 PROCEDURE — 82951 GLUCOSE TOLERANCE TEST (GTT): CPT

## 2023-06-29 PROCEDURE — 36415 COLL VENOUS BLD VENIPUNCTURE: CPT

## 2023-06-29 NOTE — TELEPHONE ENCOUNTER
----- Message from Kali Lizarraga MD sent at 6/29/2023  1:48 PM EDT -----  Please call the patient to let her know that her 3h GTT showed only one abnormal value  That is considered normal but she is still at some increased risk for developing gestational DM as the pregnancy progresses  She may need repeat testing if she has sugar in her urine  But for now, she is okay to continue eating normally      Thanks!!

## 2023-07-07 ENCOUNTER — ROUTINE PRENATAL (OUTPATIENT)
Dept: OBGYN CLINIC | Facility: CLINIC | Age: 33
End: 2023-07-07
Payer: COMMERCIAL

## 2023-07-07 VITALS
SYSTOLIC BLOOD PRESSURE: 124 MMHG | BODY MASS INDEX: 38.51 KG/M2 | DIASTOLIC BLOOD PRESSURE: 70 MMHG | HEIGHT: 69 IN | WEIGHT: 260 LBS

## 2023-07-07 DIAGNOSIS — Z34.83 PRENATAL CARE, SUBSEQUENT PREGNANCY, THIRD TRIMESTER: Primary | ICD-10-CM

## 2023-07-07 PROBLEM — Z3A.30 30 WEEKS GESTATION OF PREGNANCY: Status: ACTIVE | Noted: 2023-02-28

## 2023-07-07 LAB
SL AMB  POCT GLUCOSE, UA: NORMAL
SL AMB POCT URINE PROTEIN: NORMAL

## 2023-07-07 PROCEDURE — PNV: Performed by: OBSTETRICS & GYNECOLOGY

## 2023-07-07 PROCEDURE — 81002 URINALYSIS NONAUTO W/O SCOPE: CPT | Performed by: OBSTETRICS & GYNECOLOGY

## 2023-07-07 NOTE — PATIENT INSTRUCTIONS
Pregnancy at 31 to 34 Weeks   AMBULATORY CARE:   Changes happening with your body: You may continue to have symptoms such as shortness of breath, heartburn, contractions, or swelling of your ankles and feet. You may be gaining about 1 pound a week now. Seek care immediately if:   You develop a severe headache that does not go away. You have new or increased vision changes, such as blurred or spotted vision. You have new or increased swelling in your face or hands. You have vaginal spotting or bleeding. Your water broke or you feel warm water gushing or trickling from your vagina. Call your obstetrician if:   You have more than 5 contractions in 1 hour. You notice any changes in your baby's movements. You have abdominal cramps, pressure, or tightening. You have a change in vaginal discharge. You have chills or a fever. You have vaginal itching, burning, or pain. You have yellow, green, white, or foul-smelling vaginal discharge. You have pain or burning when you urinate, less urine than usual, or pink or bloody urine. You have questions or concerns about your condition or care. How to care for yourself at this stage of your pregnancy:       Eat a variety of healthy foods. Healthy foods include fruits, vegetables, whole-grain breads, low-fat dairy foods, beans, lean meats, and fish. Drink liquids as directed. Ask how much liquid to drink each day and which liquids are best for you. Limit caffeine to less than 200 milligrams each day. Limit your intake of fish to 2 servings each week. Choose fish low in mercury such as canned light tuna, shrimp, salmon, cod, or tilapia. Do not  eat fish high in mercury such as swordfish, tilefish, chele mackerel, and shark. Manage heartburn  by eating 4 or 5 small meals each day instead of large meals. Avoid spicy food. Manage swelling  by lying down and putting your feet up. Take prenatal vitamins as directed.   Your need for certain vitamins and minerals, such as folic acid, increases during pregnancy. Prenatal vitamins provide some of the extra vitamins and minerals you need. Prenatal vitamins may also help to decrease the risk of certain birth defects. Talk to your healthcare provider about exercise. Moderate exercise can help you stay fit. Your healthcare provider will help you plan an exercise program that is safe for you during pregnancy. Do not smoke. Smoking increases your risk of a miscarriage and other health problems during your pregnancy. Smoking can cause your baby to be born too early or weigh less at birth. Ask your healthcare provider for information if you need help quitting. Do not drink alcohol. Alcohol passes from your body to your baby through the placenta. It can affect your baby's brain development and cause fetal alcohol syndrome (FAS). FAS is a group of conditions that causes mental, behavior, and growth problems. Talk to your healthcare provider before you take any medicines. Many medicines may harm your baby if you take them when you are pregnant. Do not take any medicines, vitamins, herbs, or supplements without first talking to your healthcare provider. Never use illegal or street drugs (such as marijuana or cocaine) while you are pregnant. Safety tips during pregnancy:   Avoid hot tubs and saunas. Do not use a hot tub or sauna while you are pregnant, especially during your first trimester. Hot tubs and saunas may raise your baby's temperature and increase the risk of birth defects. Avoid toxoplasmosis. This is an infection caused by eating raw meat or being around infected cat feces. It can cause birth defects, miscarriages, and other problems. Wash your hands after you touch raw meat. Make sure any meat is well-cooked before you eat it. Avoid raw eggs and unpasteurized milk. Use gloves or ask someone else to clean your cat's litter box while you are pregnant. Changes happening with your baby:  By 34 weeks, your baby may weigh more than 5 pounds. Your baby will be about 12 ½ inches long from the top of the head to the rump (baby's bottom). Your baby is gaining about ½ pound a week. Your baby's eyes open and close now. Your baby's kicks and movements are more forceful at this time. What you need to know about prenatal care: Your healthcare provider will check your blood pressure and weight. You may also need the following:  A urine test  may also be done to check for sugar and protein. These can be signs of gestational diabetes or infection. Protein in your urine may also be a sign of preeclampsia. Preeclampsia is a condition that can develop during week 20 or later of your pregnancy. It causes high blood pressure, and it can cause problems with your kidneys and other organs. A gestational diabetes screen  may be done. Your healthcare provider may order either a 1-step or 2-step oral glucose tolerance test (OGTT). 1-step OGTT:  Your blood sugar level will be tested after you have not eaten for 8 hours (fasting). You will then be given a glucose drink. Your level will be tested again 1 hour and 2 hours after you finish the drink. 2-step OGTT:  You do not have to fast for the first part of the test. You will have the glucose drink at any time of day. Your blood sugar level will be checked 1 hour later. If your blood sugar is higher than a certain level, another test will be ordered. You will fast and your blood sugar level will be tested. You will have the glucose drink. Your blood will be tested again 1 hour, 2 hours, and 3 hours after you finish the glucose drink. A Tdap vaccine  may be recommended by your healthcare provider. Fundal height  is a measurement of your uterus to check your baby's growth. This number is usually the same as the number of weeks that you have been pregnant.  Your healthcare provider may also check your baby's position. Your baby's heart rate  will be checked. Follow up with your obstetrician as directed:  Write down your questions so you remember to ask them during your visits. © Copyright [facility] 2022 Information is for End User's use only and may not be sold, redistributed or otherwise used for commercial purposes. The above information is an  only. It is not intended as medical advice for individual conditions or treatments. Talk to your doctor, nurse or pharmacist before following any medical regimen to see if it is safe and effective for you. Gestational Diabetes Diet   AMBULATORY CARE:   A gestational diabetes diet  is a meal plan that helps control your blood sugar levels throughout your pregnancy. Too much carbohydrate in one meal or snack can cause your blood sugar to rise to a very high level. High blood sugar levels throughout your pregnancy can cause your baby to gain too much weight and lead to other health problems. A healthy meal plan will help you keep your blood sugar within the recommended range. Call your doctor or dietitian if:   Your blood sugar levels continue to be high, even though you are following your meal plan. You have a low blood sugar level during certain times of the day. You have questions or concerns about your meal plan, or you are having trouble following the plan. Meal plans:  Carbohydrate counting and diabetes exchanges are meal planning methods that can help you control your blood sugar levels. Your dietitian or healthcare provider will tell you the amount of calories, carbohydrate, and other nutrients you need each day. The amounts depend on your activity levels and if you use insulin. Your dietitian or provider will tell you how many servings of each you can have during meals and snacks. He or she can also help you find the meal plan that meets your nutrient needs and that works best for you. General carbohydrate guidelines:   Your healthcare provider may recommend the following:  Choose complex carbohydrates as often as possible. Examples include beans, vegetables, and grains, such as brown rice. All carbohydrates break down into glucose, but complex carbohydrates break down slowly than simple carbohydrates. Your blood sugar level rises more slowly after complex carbohydrates. Limit simple carbohydrates. Examples include table sugar, honey, and milk sugar (lactose). Spread carbohydrates throughout the day. Try eating 3 small to medium meals and 2 to 4 snacks. You may need to eat a snack in the evening to prevent low blood sugar during the night. Eat the same amount of carbohydrate during meals and snacks each day. Eat fewer servings of carbohydrate at breakfast than at other meals. The blood sugar level tends to be higher in the morning. Eat fewer servings of carbohydrate to keep your blood sugar level from increasing even more. Do not skip meals or cut out carbohydrates to control your blood sugar level. Your blood sugar can fall to a level that is too low to be safe. Foods that contain carbohydrate:  One serving of the foods below contains about 15 grams of carbohydrate.   Breads, cereals, and crackers:      1 slice of bread, 1 6-inch tortilla, or ¼ large bagel    ½ cup of oatmeal    ½ hamburger, hot dog bun, or English muffin    2 taco shells (5-inch size)    4 to 6 small crackers or ¾ ounce of pretzels or potato chips    Pasta, rice, starchy vegetables, and beans:      ? cup of cooked pasta or rice    ½ cup of casserole    ½ cup of cottrell beans, black beans, or split peas    ½ cup of corn, green peas, potatoes, or winter squash    ¼ of a large baked potato    Fruit:      1 small fresh fruit, such as an apple, orange, or peach    ½ cup of unsweetened fruit juice, canned fruit, or frozen fruit    2 tablespoons of dried fruit    Milk and yogurt:      1 cup of fat-free or low-fat milk or soy milk    ? cup (6 ounces) of fat-free yogurt sweetened with sugar-free sweetener    Desserts or sweets:      2 small cookies    ½ cup of ice cream or frozen yogurt    1 tablespoon syrup, jam, jelly, table sugar, or honey    Other guidelines:   Check your blood sugar level as directed. Ask your healthcare provider when and how often to check your level during the day. Write down your blood sugar level each time you check it. You may need to bring this information to follow-up visits. Talk to your providers about the amount of weight you should gain. The amount depends on your weight when you became pregnant. Your providers can help you make a plan to gain a healthy amount of weight during pregnancy. Get physical activity as directed. Physical activity, such as exercise, can help keep your blood sugar within the recommended levels. Physical activity can also keep your weight in a healthy range during pregnancy. Talk to your healthcare provider about the type and amount of physical activity that is best for you. Choose healthy sources of protein. Examples include lean meat, fish, poultry (such as chicken and turkey), eggs, cooked beans, and nuts. Choose foods that are good sources of fiber. Examples include fruits, vegetables, legumes, such as kidney beans and lentils, and whole-grain breads and cereals. Choose cereals with 5 or more grams of fiber per serving. Limit sweets and desserts. These foods are high in sugar, fat, and calories and low in healthy nutrients. Limit the amount of fat you eat each day. Choose foods that are low in saturated fat, trans fat, and cholesterol. Examples include chicken without the skin and low-fat dairy foods. Follow up with your doctor or dietitian as directed:  Write down your questions so you remember to ask them during your visits. © Copyright [employer] 2022 Information is for End User's use only and may not be sold, redistributed or otherwise used for commercial purposes.   The above information is an  only. It is not intended as medical advice for individual conditions or treatments. Talk to your doctor, nurse or pharmacist before following any medical regimen to see if it is safe and effective for you.

## 2023-07-07 NOTE — PROGRESS NOTES
[id number] 30w3d  Pap 03/01/2023. Negative HPV Negative   GC/CT:3/1/2023 Negative / Negative   PN1 Labs: 2/25/2023  Blood Type: A  Positive :   MFM Level 1:3/6/2023  MFM Level 2: 4/21/2023  AFP: 4/14/2023  28 Week Labs:  TDap:6/19/2023  Flu:  GBS:   Blue Folder: given   Yellow Folder: given   Ped Referral : given   Breast pump:  L&D forms:  Delivery consent:     Patient reports positive fetal movements with no concerns at today's visit .

## 2023-07-07 NOTE — ASSESSMENT & PLAN NOTE
Litzy Rai is a 29 y.o.   30w3d who presents for routine PNV. 28 week labs reviewed: abnormal 1 hr, normal 3 hr   ASA & PVN daily   TWG 14.5 kg (32 lb)   Denies OB complaints. Good fetal movement. Denies contractions, cramping, leakage of fluid or vaginal bleeding. Tdap vaccine completed  Reviewed  labor precautions and FKCs.    Advised to start Perineal massage at 34-36 weeks   Pregnancy Essential guide and Baby and Me web site recommended

## 2023-07-07 NOTE — PROGRESS NOTES
Problem   30 Weeks Gestation of Pregnancy    Blood Type: A. +       Pap 2019 Neg, Pap& GC/CT sent at PN1  PN1 Labs: normal  H&H: 12.5/37.7  28 Week Labs:  AFP: pending   Level 1:3/6/2023  Level 2:23  Tdap:  Flu:   GBS swab:     Blue folder: Given   Yellow folder:     Breast pump order:  L&D forms:    Delivery consent:   IOL:                30 weeks gestation of pregnancy  Umberto Moser is a 29 y.o. [id number]  30w3d who presents for routine PNV. 28 week labs reviewed: abnormal 1 hr, normal 3 hr   ASA & PVN daily   TWG 14.5 kg (32 lb)   Denies OB complaints. Good fetal movement. Denies contractions, cramping, leakage of fluid or vaginal bleeding. Tdap vaccine completed  Reviewed  labor precautions and FKCs.    Advised to start Perineal massage at 34-36 weeks   Pregnancy Essential guide and Baby and Me web site recommended

## 2023-07-10 LAB
DME PARACHUTE DELIVERY DATE REQUESTED: NORMAL
DME PARACHUTE ITEM DESCRIPTION: NORMAL
DME PARACHUTE ORDER STATUS: NORMAL
DME PARACHUTE SUPPLIER NAME: NORMAL
DME PARACHUTE SUPPLIER PHONE: NORMAL

## 2023-07-14 ENCOUNTER — ULTRASOUND (OUTPATIENT)
Dept: PERINATAL CARE | Facility: OTHER | Age: 33
End: 2023-07-14
Payer: COMMERCIAL

## 2023-07-14 VITALS
BODY MASS INDEX: 38.63 KG/M2 | WEIGHT: 260.8 LBS | SYSTOLIC BLOOD PRESSURE: 124 MMHG | DIASTOLIC BLOOD PRESSURE: 80 MMHG | HEART RATE: 98 BPM | HEIGHT: 69 IN

## 2023-07-14 DIAGNOSIS — Z87.59 HISTORY OF GESTATIONAL HYPERTENSION: ICD-10-CM

## 2023-07-14 DIAGNOSIS — O99.213 OBESITY AFFECTING PREGNANCY IN THIRD TRIMESTER: Primary | ICD-10-CM

## 2023-07-14 DIAGNOSIS — O26.02 EXCESSIVE WEIGHT GAIN DURING PREGNANCY IN SECOND TRIMESTER: ICD-10-CM

## 2023-07-14 DIAGNOSIS — Z3A.31 31 WEEKS GESTATION OF PREGNANCY: ICD-10-CM

## 2023-07-14 PROCEDURE — 76816 OB US FOLLOW-UP PER FETUS: CPT | Performed by: OBSTETRICS & GYNECOLOGY

## 2023-07-14 PROCEDURE — 99213 OFFICE O/P EST LOW 20 MIN: CPT | Performed by: OBSTETRICS & GYNECOLOGY

## 2023-07-14 NOTE — PROGRESS NOTES
Emily Solis has no complaints today. She reports regular fetal movements and does not report any problems. She is here today at 31w3d for an ultrasound for growth and missed anatomy. She has gained 32 pounds in pregnancy. She had 1 abnormal value on her 3-hour glucose tolerance test.    Problem list:  1. Obesity based on a pregravid BMI of 33    Ultrasound findings: The ultrasound today shows normal interval fetal growth and fluid. The prior missed anatomy was reviewed and no malformations were detected however views of the lumbar sacral spine, ductal arch and aortic arch are still limited based on fetal position. Pregnancy ultrasound has limitations and is unable to detect all forms of fetal congenital abnormalities. The inaccuracy in the EFW can be off by 1 lb either way in the third trimester. Follow up recommended:   1. Recommend 1 last growth scan in 6 weeks along with review of the missed anatomy based on her elevated BMI and increased weight gain in pregnancy that could increase her risk to have a large baby at birth. Pre visit time reviewing her records   5 minutes  Face to face time 5 minutes  Post visit time on documentation of note, updating her problem list, adding orders and prescriptions 5 minutes. Procedures that were completed today were charged separately. The level of decision making was low level complexity.     Fernando Coppola MD

## 2023-07-14 NOTE — LETTER
July 14, 2023     Michael Cantu, [address] [address]    Patient: Angel Rayo   YOB: 1990   Date of Visit: 7/14/2023       Dear Dr. Lena Moreira:    Thank you for referring Angel Rayo to me for evaluation. Below are my notes for this consultation. If you have questions, please do not hesitate to call me. I look forward to following your patient along with you. Sincerely,        Michael Samaniego MD        CC: No Recipients    Michael Samaniego MD  7/14/2023  4:43 PM  Sign when Signing Visit  Angel Rayo has no complaints today. She reports regular fetal movements and does not report any problems. She is here today at 31w3d for an ultrasound for growth and missed anatomy. She has gained 32 pounds in pregnancy. She had 1 abnormal value on her 3-hour glucose tolerance test.    Problem list:  1. Obesity based on a pregravid BMI of 33    Ultrasound findings: The ultrasound today shows normal interval fetal growth and fluid. The prior missed anatomy was reviewed and no malformations were detected however views of the lumbar sacral spine, ductal arch and aortic arch are still limited based on fetal position. Pregnancy ultrasound has limitations and is unable to detect all forms of fetal congenital abnormalities. The inaccuracy in the EFW can be off by 1 lb either way in the third trimester. Follow up recommended:   1. Recommend 1 last growth scan in 6 weeks along with review of the missed anatomy based on her elevated BMI and increased weight gain in pregnancy that could increase her risk to have a large baby at birth. Pre visit time reviewing her records   5 minutes  Face to face time 5 minutes  Post visit time on documentation of note, updating her problem list, adding orders and prescriptions 5 minutes. Procedures that were completed today were charged separately.    The level of decision making was low level complexity.     Bernabe Schwartz MD

## 2023-07-18 ENCOUNTER — ROUTINE PRENATAL (OUTPATIENT)
Dept: OBGYN CLINIC | Facility: CLINIC | Age: 33
End: 2023-07-18

## 2023-07-18 VITALS
SYSTOLIC BLOOD PRESSURE: 130 MMHG | DIASTOLIC BLOOD PRESSURE: 90 MMHG | HEIGHT: 69 IN | BODY MASS INDEX: 38.51 KG/M2 | WEIGHT: 260 LBS

## 2023-07-18 DIAGNOSIS — Z3A.32 32 WEEKS GESTATION OF PREGNANCY: Primary | ICD-10-CM

## 2023-07-18 PROCEDURE — PNV

## 2023-07-18 NOTE — ASSESSMENT & PLAN NOTE
Umberto Moser is a 29 y.o.   32w0d who presents for routine PNV. 28 week labs reviewed: nml  Prepregnancy BMI 33.65 with a goal weight gain 5 kg (11 lb)-9 kg (19 lb). TWG 14.5 kg (32 lb)  Denies OB complaints. Good fetal movement. Denies CTX/LOF/VB    Patient reports lower back pain and discomfort - pregnancy support band recommended. Reviewed  labor precautions and FKCs.    Advised to start Perineal massage at 34-36 weeks   Pregnancy Essential guide and Baby and Me web site recommended

## 2023-07-18 NOTE — PATIENT INSTRUCTIONS
Pregnancy at 31 to 34 Weeks   AMBULATORY CARE:   Changes happening with your body: You may continue to have symptoms such as shortness of breath, heartburn, contractions, or swelling of your ankles and feet. You may be gaining about 1 pound a week now. Seek care immediately if:   You develop a severe headache that does not go away. You have new or increased vision changes, such as blurred or spotted vision. You have new or increased swelling in your face or hands. You have vaginal spotting or bleeding. Your water broke or you feel warm water gushing or trickling from your vagina. Call your obstetrician if:   You have more than 5 contractions in 1 hour. You notice any changes in your baby's movements. You have abdominal cramps, pressure, or tightening. You have a change in vaginal discharge. You have chills or a fever. You have vaginal itching, burning, or pain. You have yellow, green, white, or foul-smelling vaginal discharge. You have pain or burning when you urinate, less urine than usual, or pink or bloody urine. You have questions or concerns about your condition or care. How to care for yourself at this stage of your pregnancy:       Eat a variety of healthy foods. Healthy foods include fruits, vegetables, whole-grain breads, low-fat dairy foods, beans, lean meats, and fish. Drink liquids as directed. Ask how much liquid to drink each day and which liquids are best for you. Limit caffeine to less than 200 milligrams each day. Limit your intake of fish to 2 servings each week. Choose fish low in mercury such as canned light tuna, shrimp, salmon, cod, or tilapia. Do not  eat fish high in mercury such as swordfish, tilefish, chele mackerel, and shark. Manage heartburn  by eating 4 or 5 small meals each day instead of large meals. Avoid spicy food. Manage swelling  by lying down and putting your feet up. Take prenatal vitamins as directed.   Your need for certain vitamins and minerals, such as folic acid, increases during pregnancy. Prenatal vitamins provide some of the extra vitamins and minerals you need. Prenatal vitamins may also help to decrease the risk of certain birth defects. Talk to your healthcare provider about exercise. Moderate exercise can help you stay fit. Your healthcare provider will help you plan an exercise program that is safe for you during pregnancy. Do not smoke. Smoking increases your risk of a miscarriage and other health problems during your pregnancy. Smoking can cause your baby to be born too early or weigh less at birth. Ask your healthcare provider for information if you need help quitting. Do not drink alcohol. Alcohol passes from your body to your baby through the placenta. It can affect your baby's brain development and cause fetal alcohol syndrome (FAS). FAS is a group of conditions that causes mental, behavior, and growth problems. Talk to your healthcare provider before you take any medicines. Many medicines may harm your baby if you take them when you are pregnant. Do not take any medicines, vitamins, herbs, or supplements without first talking to your healthcare provider. Never use illegal or street drugs (such as marijuana or cocaine) while you are pregnant. Safety tips during pregnancy:   Avoid hot tubs and saunas. Do not use a hot tub or sauna while you are pregnant, especially during your first trimester. Hot tubs and saunas may raise your baby's temperature and increase the risk of birth defects. Avoid toxoplasmosis. This is an infection caused by eating raw meat or being around infected cat feces. It can cause birth defects, miscarriages, and other problems. Wash your hands after you touch raw meat. Make sure any meat is well-cooked before you eat it. Avoid raw eggs and unpasteurized milk. Use gloves or ask someone else to clean your cat's litter box while you are pregnant. Changes happening with your baby:  By 34 weeks, your baby may weigh more than 5 pounds. Your baby will be about 12 ½ inches long from the top of the head to the rump (baby's bottom). Your baby is gaining about ½ pound a week. Your baby's eyes open and close now. Your baby's kicks and movements are more forceful at this time. What you need to know about prenatal care: Your healthcare provider will check your blood pressure and weight. You may also need the following:  A urine test  may also be done to check for sugar and protein. These can be signs of gestational diabetes or infection. Protein in your urine may also be a sign of preeclampsia. Preeclampsia is a condition that can develop during week 20 or later of your pregnancy. It causes high blood pressure, and it can cause problems with your kidneys and other organs. A gestational diabetes screen  may be done. Your healthcare provider may order either a 1-step or 2-step oral glucose tolerance test (OGTT). 1-step OGTT:  Your blood sugar level will be tested after you have not eaten for 8 hours (fasting). You will then be given a glucose drink. Your level will be tested again 1 hour and 2 hours after you finish the drink. 2-step OGTT:  You do not have to fast for the first part of the test. You will have the glucose drink at any time of day. Your blood sugar level will be checked 1 hour later. If your blood sugar is higher than a certain level, another test will be ordered. You will fast and your blood sugar level will be tested. You will have the glucose drink. Your blood will be tested again 1 hour, 2 hours, and 3 hours after you finish the glucose drink. A Tdap vaccine  may be recommended by your healthcare provider. Fundal height  is a measurement of your uterus to check your baby's growth. This number is usually the same as the number of weeks that you have been pregnant.  Your healthcare provider may also check your baby's position. Your baby's heart rate  will be checked. Follow up with your obstetrician as directed:  Write down your questions so you remember to ask them during your visits. © Copyright Gilmer Mohan 2022 Information is for End User's use only and may not be sold, redistributed or otherwise used for commercial purposes. The above information is an  only. It is not intended as medical advice for individual conditions or treatments. Talk to your doctor, nurse or pharmacist before following any medical regimen to see if it is safe and effective for you.

## 2023-07-18 NOTE — PROGRESS NOTES
Prenatal Visit 32w0d    Patient denies any lof, vb or ctx. Patient has +fm. Patient could not void  Tdap is utd  Delivery consent signed  Patient said she is feeling very uncomfortable today.

## 2023-07-18 NOTE — PROGRESS NOTES
Problem   32 Weeks Gestation of Pregnancy    Blood Type: A. +       Pap 2019 Neg, Pap& GC/CT sent at PN1  PN1 Labs: normal  H&H: 12.5/37.7  28 Week Labs: nml    Level 1:3/6/2023  Level 2:23  Tdap: completed  Flu:   GBS swab:     Blue folder: Given   Yellow folder:     Breast pump order: order submitted  L&D forms:    Delivery consent: signed  IOL:                32 weeks gestation of pregnancy  Theola Minors is a 29 y.o.   32w0d who presents for routine PNV. 28 week labs reviewed: nml  Prepregnancy BMI 33.65 with a goal weight gain 5 kg (11 lb)-9 kg (19 lb). TWG 14.5 kg (32 lb)  Denies OB complaints. Good fetal movement. Denies CTX/LOF/VB    Patient reports lower back pain and discomfort - pregnancy support band recommended. Reviewed  labor precautions and FKCs.    Advised to start Perineal massage at 34-36 weeks   Pregnancy Essential guide and Baby and Me web site recommended

## 2023-07-27 PROBLEM — Z3A.33 33 WEEKS GESTATION OF PREGNANCY: Status: ACTIVE | Noted: 2023-02-28

## 2023-07-27 NOTE — ASSESSMENT & PLAN NOTE
She feels well. She denies LOF/CTX/VB. She voiced no concerns. Discussed fetal kick counting. She was encouraged to start with her perineal/vaginal massages to prevent lacerations during the labor process.

## 2023-07-27 NOTE — PATIENT INSTRUCTIONS
Pregnancy at 31 to [address] [facility] [address]:   What changes are happening with my body? You may continue to have symptoms such as shortness of breath, heartburn, contractions, or swelling of your ankles and feet. You may be gaining about 1 pound a week now. How do I care for myself at this stage of my pregnancy? Eat a variety of healthy foods. Healthy foods include fruits, vegetables, whole-grain breads, low-fat dairy foods, beans, lean meats, and fish. Drink liquids as directed. Ask how much liquid to drink each day and which liquids are best for you. Limit caffeine to less than 200 milligrams each day. Limit your intake of fish to 2 servings each week. Choose fish low in mercury such as canned light tuna, shrimp, salmon, cod, or tilapia. Do not  eat fish high in mercury such as swordfish, tilefish, chele mackerel, and shark. Manage heartburn  by eating 4 or 5 small meals each day instead of large meals. Avoid spicy food. Manage swelling  by lying down and putting your feet up. Take prenatal vitamins as directed. Your need for certain vitamins and minerals, such as folic acid, increases during pregnancy. Prenatal vitamins provide some of the extra vitamins and minerals you need. Prenatal vitamins may also help to decrease the risk of certain birth defects. Talk to your healthcare provider about exercise. Moderate exercise can help you stay fit. Your healthcare provider will help you plan an exercise program that is safe for you during pregnancy. Do not smoke. Smoking increases your risk of a miscarriage and other health problems during your pregnancy. Smoking can cause your baby to be born too early or weigh less at birth. Ask your healthcare provider for information if you need help quitting. Do not drink alcohol. Alcohol passes from your body to your baby through the placenta.  It can affect your baby's brain development and cause fetal alcohol syndrome (FAS). FAS is a group of conditions that causes mental, behavior, and growth problems. Talk to your healthcare provider before you take any medicines. Many medicines may harm your baby if you take them when you are pregnant. Do not take any medicines, vitamins, herbs, or supplements without first talking to your healthcare provider. Never use illegal or street drugs (such as marijuana or cocaine) while you are pregnant. What are some safety tips during pregnancy? Avoid hot tubs and saunas. Do not use a hot tub or sauna while you are pregnant, especially during your first trimester. Hot tubs and saunas may raise your baby's temperature and increase the risk of birth defects. Avoid toxoplasmosis. This is an infection caused by eating raw meat or being around infected cat feces. It can cause birth defects, miscarriages, and other problems. Wash your hands after you touch raw meat. Make sure any meat is well-cooked before you eat it. Avoid raw eggs and unpasteurized milk. Use gloves or ask someone else to clean your cat's litter box while you are pregnant. What changes are happening with my baby? By 34 weeks, your baby may weigh more than 5 pounds. Your baby will be about 12 ½ inches long from the top of the head to the rump (baby's bottom). Your baby is gaining about ½ pound a week. Your baby's eyes open and close now. Your baby's kicks and movements are more forceful at this time. What do I need to know about prenatal care? Your healthcare provider will check your blood pressure and weight. You may also need the following:  A urine test  may also be done to check for sugar and protein. These can be signs of gestational diabetes or infection. Protein in your urine may also be a sign of preeclampsia. Preeclampsia is a condition that can develop during week 20 or later of your pregnancy. It causes high blood pressure, and it can cause problems with your kidneys and other organs.     A gestational diabetes screen  may be done. Your healthcare provider may order either a 1-step or 2-step oral glucose tolerance test (OGTT). 1-step OGTT:  Your blood sugar level will be tested after you have not eaten for 8 hours (fasting). You will then be given a glucose drink. Your level will be tested again 1 hour and 2 hours after you finish the drink. 2-step OGTT:  You do not have to fast for the first part of the test. You will have the glucose drink at any time of day. Your blood sugar level will be checked 1 hour later. If your blood sugar is higher than a certain level, another test will be ordered. You will fast and your blood sugar level will be tested. You will have the glucose drink. Your blood will be tested again 1 hour, 2 hours, and 3 hours after you finish the glucose drink. A Tdap vaccine  may be recommended by your healthcare provider. Fundal height  is a measurement of your uterus to check your baby's growth. This number is usually the same as the number of weeks that you have been pregnant. Your healthcare provider may also check your baby's position. Your baby's heart rate  will be checked. When should I seek immediate care? You develop a severe headache that does not go away. You have new or increased vision changes, such as blurred or spotted vision. You have new or increased swelling in your face or hands. You have vaginal spotting or bleeding. Your water broke or you feel warm water gushing or trickling from your vagina. When should I call my obstetrician? You have more than 5 contractions in 1 hour. You notice any changes in your baby's movements. You have abdominal cramps, pressure, or tightening. You have a change in vaginal discharge. You have chills or a fever. You have vaginal itching, burning, or pain. You have yellow, green, white, or foul-smelling vaginal discharge.     You have pain or burning when you urinate, less urine than usual, or pink or bloody urine. You have questions or concerns about your condition or care. CARE AGREEMENT:   You have the right to help plan your care. Learn about your health condition and how it may be treated. Discuss treatment options with your healthcare providers to decide what care you want to receive. You always have the right to refuse treatment. The above information is an  only. It is not intended as medical advice for individual conditions or treatments. Talk to your doctor, nurse or pharmacist before following any medical regimen to see if it is safe and effective for you. © Copyright Isabel Black 2022 Information is for End User's use only and may not be sold, redistributed or otherwise used for commercial purposes.

## 2023-07-31 ENCOUNTER — ROUTINE PRENATAL (OUTPATIENT)
Dept: OBGYN CLINIC | Facility: CLINIC | Age: 33
End: 2023-07-31
Payer: COMMERCIAL

## 2023-07-31 VITALS
WEIGHT: 263 LBS | SYSTOLIC BLOOD PRESSURE: 126 MMHG | BODY MASS INDEX: 38.95 KG/M2 | DIASTOLIC BLOOD PRESSURE: 82 MMHG | HEIGHT: 69 IN | HEART RATE: 88 BPM

## 2023-07-31 DIAGNOSIS — O99.213 OBESITY AFFECTING PREGNANCY IN THIRD TRIMESTER: ICD-10-CM

## 2023-07-31 DIAGNOSIS — Z87.59 HISTORY OF GESTATIONAL HYPERTENSION: ICD-10-CM

## 2023-07-31 DIAGNOSIS — Z3A.33 33 WEEKS GESTATION OF PREGNANCY: ICD-10-CM

## 2023-07-31 DIAGNOSIS — Z34.83 PRENATAL CARE, SUBSEQUENT PREGNANCY, THIRD TRIMESTER: Primary | ICD-10-CM

## 2023-07-31 DIAGNOSIS — F84.0 HIGH-FUNCTIONING AUTISM SPECTRUM DISORDER: ICD-10-CM

## 2023-07-31 LAB
SL AMB  POCT GLUCOSE, UA: NORMAL
SL AMB POCT UROBILINOGEN: NORMAL

## 2023-07-31 PROCEDURE — 81002 URINALYSIS NONAUTO W/O SCOPE: CPT | Performed by: NURSE PRACTITIONER

## 2023-07-31 PROCEDURE — PNV: Performed by: NURSE PRACTITIONER

## 2023-07-31 NOTE — PROGRESS NOTES
Problem   33 Weeks Gestation of Pregnancy    Blood Type: A. +       Pap 01/23/2019 Neg, Pap& GC/CT neg x 3  PN1 Labs: normal  H&H: 12.5/37.7  28 Week Labs: nml    Level 1:3/6/2023  Level 2:4/25/23  Tdap: completed    GBS swab: GBS bacteruria    Blue folder: Given   Yellow folder: given    Breast pump order: order submitted  L&D forms: will bring    Delivery consent: signed  IOL:                33 weeks gestation of pregnancy  She feels well. She denies LOF/CTX/VB. She voiced no concerns. Discussed fetal kick counting. She was encouraged to start with her perineal/vaginal massages to prevent lacerations during the labor process.

## 2023-08-06 ENCOUNTER — APPOINTMENT (EMERGENCY)
Dept: RADIOLOGY | Facility: HOSPITAL | Age: 33
End: 2023-08-06
Payer: COMMERCIAL

## 2023-08-06 ENCOUNTER — HOSPITAL ENCOUNTER (EMERGENCY)
Facility: HOSPITAL | Age: 33
Discharge: HOME | End: 2023-08-06
Attending: EMERGENCY MEDICINE | Admitting: EMERGENCY MEDICINE
Payer: COMMERCIAL

## 2023-08-06 VITALS
SYSTOLIC BLOOD PRESSURE: 124 MMHG | TEMPERATURE: 97 F | DIASTOLIC BLOOD PRESSURE: 74 MMHG | OXYGEN SATURATION: 97 % | RESPIRATION RATE: 20 BRPM | HEART RATE: 93 BPM

## 2023-08-06 DIAGNOSIS — U07.1 COVID-19: Primary | ICD-10-CM

## 2023-08-06 LAB
FLUAV RNA RESP QL NAA+PROBE: NEGATIVE
FLUBV RNA RESP QL NAA+PROBE: NEGATIVE
RSV RNA RESP QL NAA+PROBE: NEGATIVE
SARS-COV-2 RNA RESP QL NAA+PROBE: POSITIVE

## 2023-08-06 PROCEDURE — 0241U HB NFCT DS VIR RESP RNA 4 TRGT: CPT

## 2023-08-06 PROCEDURE — 71046 X-RAY EXAM CHEST 2 VIEWS: CPT

## 2023-08-06 PROCEDURE — 99284 EMERGENCY DEPT VISIT MOD MDM: CPT

## 2023-08-06 PROCEDURE — 99285 EMERGENCY DEPT VISIT HI MDM: CPT

## 2023-08-06 NOTE — Clinical Note
Ryan Artvers was seen and treated in our emergency department on 8/6/2023. Diagnosis:     Josselin Jones  may return to work on return date. She may return on this date: 08/09/2023         If you have any questions or concerns, please don't hesitate to call.       Eliseo Vasquez PA-C    ______________________________           _______________          _______________  Hospital Representative                              Date                                Time

## 2023-08-06 NOTE — Clinical Note
Laxmi Roldan was seen and treated in our emergency department on 8/6/2023. Diagnosis:     Blossom Alberts  may return to work on return date. She may return on this date: 08/09/2023         If you have any questions or concerns, please don't hesitate to call.       Patricia Senior PA-C    ______________________________           _______________          _______________  Hospital Representative                              Date                                Time

## 2023-08-06 NOTE — DISCHARGE INSTRUCTIONS
Follow up with PCP  Follow up with OBGYN  Supportive care  Return to the ED with new or worsening symptoms including but not limited to decreased oral intake, abdominal pain, fevers uncontrolled by tylenol

## 2023-08-06 NOTE — ED NOTES
Pt determined bloodwork unnecessary due to positive Covid swab result. Provider aware.      Milan Peñaloza RN  08/06/23 1347

## 2023-08-06 NOTE — ED PROVIDER NOTES
History  Chief Complaint   Patient presents with   • Shortness of Breath     Pt reports she is short of breath, has been vomiting, has a cough, and body aches beginning Thursday night. Pt is 34 weeks pregnant      Patient is a 29-year-old female with a past medical history of asthma, depression, migraine, autism spectrum disorder presenting to the emergency department for evaluation of flulike symptoms. Patient reports since Thursday she has been having a cough, congestion, difficulty taking a deep breath, nausea and vomiting. She reports she is 34 weeks pregnant, planning to deliver at [facility]. Patient reports her fiancé recently had a cold and her daughter has a GI bug. Patient reports she has been having a difficult time tolerating p.o. intake. Patient reports she has been able to keep water down but reports she vomits daily. Patient reports relapse episode of vomiting was at 2 AM this morning. Patient reports she did have bread and water since this morning without vomiting. Patient reports she does not have any abdominal pain and reports good fetal movement. Patient reports she is G2, P1. Patient reports this morning she did have a temperature of 100, did not take any medication. Denies chills, rash, headache, weakness, dizziness, visual changes, abdominal pain, diarrhea, constipation, chest pain, shortness of breath or difficulty breathing. Does not offer any other concerns or complaints. Prior to Admission Medications   Prescriptions Last Dose Informant Patient Reported? Taking?    Prenatal Vit w/Mk-Zacdsarkl-IQ (PNV PO)  Self Yes No   Sig: Take by mouth   aspirin (ECOTRIN LOW STRENGTH) 81 mg EC tablet  Self No No   Sig: Take 2 tablets (162 mg total) by mouth daily      Facility-Administered Medications: None       Past Medical History:   Diagnosis Date   • Asthma     childhood asthma  inhaler prn   • Autism spectrum disorder    • Depression     diag with depression/anxiety age 27 -no meds now   • Migraine     diag 2 yrs ago- takes extra-strength tylenol   • Termination of pregnancy (fetus)    • Urinary tract infection     hx of  last episode 2018   • Varicella     received vaccines       Past Surgical History:   Procedure Laterality Date   • NO PAST SURGERIES     • WISDOM TOOTH EXTRACTION  age 23       Family History   Problem Relation Age of Onset   • Asthma Mother    • Bipolar disorder Mother    • Post-traumatic stress disorder Mother    • Diabetes Mother    • Anxiety disorder Father    • Depression Sister    • Drug abuse Sister    • No Known Problems Daughter    • Cancer Maternal Grandfather    • No Known Problems Paternal Grandmother    • No Known Problems Paternal Grandfather    • Depression Maternal Uncle    • Anxiety disorder Maternal Uncle    • Diabetes Paternal Aunt    • Breast cancer Family    • Colon cancer Neg Hx    • Ovarian cancer Neg Hx    • Uterine cancer Neg Hx    • Cervical cancer Neg Hx      I have reviewed and agree with the history as documented. E-Cigarette/Vaping   • E-Cigarette Use Never User      E-Cigarette/Vaping Substances     Social History     Tobacco Use   • Smoking status: Former     Packs/day: 0.50     Years: 10.00     Total pack years: 5.00     Types: Cigarettes     Quit date: 10/1/2018     Years since quittin.8   • Smokeless tobacco: Never   • Tobacco comments:     smokes 1 cigarette per dAY! Vaping Use   • Vaping Use: Never used   Substance Use Topics   • Alcohol use: Not Currently     Comment: occasional-none since pregnancy   • Drug use: Not Currently     Types: Marijuana     Comment: Lst used 2018       Review of Systems   Constitutional: Positive for fever. Negative for chills. HENT: Positive for congestion. Negative for ear pain and sore throat. Eyes: Negative for pain and visual disturbance. Respiratory: Positive for cough. Negative for shortness of breath.          Difficulty taking a deep breath   Cardiovascular: Negative for chest pain and palpitations. Gastrointestinal: Positive for nausea and vomiting. Negative for abdominal pain, constipation and diarrhea. Genitourinary: Negative for dysuria and hematuria. Musculoskeletal: Negative for arthralgias and back pain. Skin: Negative for color change and rash. Neurological: Negative for seizures and syncope. All other systems reviewed and are negative. Physical Exam  Physical Exam  Vitals and nursing note reviewed. Constitutional:       General: She is not in acute distress. Appearance: Normal appearance. She is well-developed. She is not toxic-appearing or diaphoretic. HENT:      Head: Normocephalic and atraumatic. Right Ear: External ear normal.      Left Ear: External ear normal.      Nose: Nose normal.      Mouth/Throat:      Mouth: Mucous membranes are moist.   Eyes:      General: No scleral icterus. Right eye: No discharge. Left eye: No discharge. Conjunctiva/sclera: Conjunctivae normal.   Cardiovascular:      Rate and Rhythm: Normal rate and regular rhythm. Heart sounds: No murmur heard. Pulmonary:      Effort: Pulmonary effort is normal. No respiratory distress. Breath sounds: Normal breath sounds. No wheezing, rhonchi or rales. Abdominal:      Palpations: Abdomen is soft. Tenderness: There is no abdominal tenderness. Comments: Pregnant. Fetal movement noted on bedside ultrasound, fetal heart rate 120-127 visualized on ultrasound. Musculoskeletal:         General: No swelling, deformity or signs of injury. Normal range of motion. Cervical back: Normal range of motion and neck supple. No rigidity. Skin:     General: Skin is warm and dry. Capillary Refill: Capillary refill takes less than 2 seconds. Coloration: Skin is not jaundiced. Findings: No erythema or rash. Neurological:      General: No focal deficit present.       Mental Status: She is alert and oriented to person, place, and time. Mental status is at baseline. Cranial Nerves: No cranial nerve deficit. Gait: Gait normal.   Psychiatric:         Mood and Affect: Mood normal.         Behavior: Behavior normal.         Thought Content: Thought content normal.         Judgment: Judgment normal.         Vital Signs  ED Triage Vitals [08/06/23 1748]   Temperature Pulse Respirations Blood Pressure SpO2   (!) 97 °F (36.1 °C) 95 19 133/70 98 %      Temp Source Heart Rate Source Patient Position - Orthostatic VS BP Location FiO2 (%)   Oral Monitor -- -- --      Pain Score       --           Vitals:    08/06/23 1748 08/06/23 1815   BP: 133/70 124/74   Pulse: 95 93         Visual Acuity      ED Medications  Medications - No data to display    Diagnostic Studies  Results Reviewed     Procedure Component Value Units Date/Time    FLU/RSV/COVID - if FLU/RSV clinically relevant [[id number]]  (Abnormal) Collected: 08/06/23 1805    Lab Status: Final result Specimen: Nares from Nose Updated: 08/06/23 1852     SARS-CoV-2 Positive     INFLUENZA A PCR Negative     INFLUENZA B PCR Negative     RSV PCR Negative    Narrative:      FOR PEDIATRIC PATIENTS - copy/paste COVID Guidelines URL to browser: [email or web address]    SARS-CoV-2 assay is a Nucleic Acid Amplification assay intended for the  qualitative detection of nucleic acid from SARS-CoV-2 in nasopharyngeal  swabs. Results are for the presumptive identification of SARS-CoV-2 RNA. Positive results are indicative of infection with SARS-CoV-2, the virus  causing COVID-19, but do not rule out bacterial infection or co-infection  with other viruses. Laboratories within the [facility] and its  territories are required to report all positive results to the appropriate  public health authorities. Negative results do not preclude SARS-CoV-2  infection and should not be used as the sole basis for treatment or other  patient management decisions. Negative results must be combined with  clinical observations, patient history, and epidemiological information. This test has not been FDA cleared or approved. This test has been authorized by FDA under an Emergency Use Authorization  (EUA). This test is only authorized for the duration of time the  declaration that circumstances exist justifying the authorization of the  emergency use of an in vitro diagnostic tests for detection of SARS-CoV-2  virus and/or diagnosis of COVID-19 infection under section 564(b)(1) of  the Act, 21 U. S.C. 788ORQ-9(L)(5), unless the authorization is terminated  or revoked sooner. The test has been validated but independent review by FDA  and CLIA is pending. Test performed using [employer] Gene[employer]pert: This RT-PCR assay targets N2,  a region unique to SARS-CoV-2. A conserved region in the E-gene was chosen  for pan-Sarbecovirus detection which includes SARS-CoV-2. According to CMS-2020-01-R, this platform meets the definition of high-throughput technology. XR chest 2 views    (Results Pending)              Procedures  Procedures         ED Course  ED Course as of 08/06/23 2115   Sun Aug 06, 2023   1833 FHR visualized, 120-127; fetal movement noted on bedside US   1834 Reached out to Dr. Joel De Leon, recommending fetal monitoring but states if patient feels good fetal movement and FHR is visualized with movement on US patient can go home if she wishes. 0737 Patient is positive for COVID. Requesting to not get the blood work. Discussed risk/benefit, patient understands   9935 SARS-COV-2(!): Positive               SBIRT 22yo+    Flowsheet Row Most Recent Value   Initial Alcohol Screen: US AUDIT-C     1. How often do you have a drink containing alcohol? 0 Filed at: 08/06/2023 1749   2. How many drinks containing alcohol do you have on a typical day you are drinking? 0 Filed at: 08/06/2023 1749   3a. Male UNDER 65:  How often do you have five or more drinks on one occasion? 0 Filed at: 08/06/2023 1749   3b. FEMALE Any Age, or MALE 65+: How often do you have 4 or more drinks on one occassion? 0 Filed at: 08/06/2023 1749   Audit-C Score 0 Filed at: 08/06/2023 1749   DONY: How many times in the past year have you. .. Used an illegal drug or used a prescription medication for non-medical reasons? Never Filed at: 08/06/2023 1749                    Medical Decision Making    This is a 60-year-old female present to the emergency department for evaluation of flulike symptoms. Patient reports since Thursday she has had cough, congestion, nausea, vomiting and difficulty taking deep breath. Patient reports her family members have had recent colds and GI bugs. Patient reports she is 34 weeks pregnant. Patient reports she has been feeling good fetal movement. Patient reports she has been unable to tolerate p.o. intake secondary to nausea and vomiting. Patient reports last episode of vomiting was at 2 AM.  Patient reports she has been able to tolerate bread and water today without vomiting. Patient denies any abdominal discomfort. Patient reports she is G2, P1. Patient reports she is planning to deliver at  [address] [facility]. Differential diagnosis to include but is not limited to: Acute viral syndrome, COVID/with/RSV, PE, pneumonia    Initial ED Plan: COVID//RSV swab, chest x-ray, D-dimer    ED results:  -Discussed with Dr. Ollie Faye, recommending due to pregnancy viability, patient have fetal monitoring.  did state that if the patient is feeling good fetal movement and we can visualize fetal heart rate and fetal movement on bedside ultrasound the patient does not want to go to [facility] the patient can be discharged with outpatient follow-up.  -Patient was positive for COVID-19, discussed risks and benefits of continuing blood work. Patient reports she would like to hold off on the blood work at this time.   Risk and benefits were discussed with the patient. Patient reports she understands. Patient reports she will follow-up with her OB/GYN outpatient. Final ED assessment: Patient is stable and well appearing. Discussed radiologic studies and laboratory results. Discussed follow-up with PCP and OB/GYN. Discussed supportive care. Strict return precautions were discussed including but not limited to shortness of breath, difficulty breathing, chest pain, weakness, dizziness, abdominal pain, decreased fetal movement, decreased oral intake. Patient verbalized understanding and is agreeable with the plan for discharge. Amount and/or Complexity of Data Reviewed  Labs: ordered. Decision-making details documented in ED Course. Radiology: ordered. Disposition  Final diagnoses:   [id number]     Time reflects when diagnosis was documented in both MDM as applicable and the Disposition within this note     Time User Action Codes Description Comment    8/6/2023  6:58 PM Diaz Baumann Add [U07.1] COVID-19       ED Disposition     ED Disposition   Discharge    Condition   Stable    Date/Time   Sun Aug 6, 2023  6:57 PM    Comment   Arn Reel discharge to home/self care.                Follow-up Information     Follow up With Specialties Details Why Contact Info Additional Information    Rick Dsouza PA-C Family Medicine, Physician Assistant Call in 3 days For follow up [address] [place] [address] [phone]       [facility] Emergency Department Emergency Medicine Go to  If symptoms worsen [address] [facility] Emergency Department, [place], [place], [address] [facility] Ob[facility]etrics and Gynecology Call in 3 days For follow up [address] [place] [address] [phone] [facility]iatric hospital Gynecology [facility], [address], [address] [facility]   [phone]          Discharge Medication List as of 8/6/2023  6:58 PM      CONTINUE these medications which have NOT CHANGED    Details   aspirin (ECOTRIN LOW STRENGTH) 81 mg EC tablet Take 2 tablets (162 mg total) by mouth daily, Starting Tue 3/28/2023, Normal      Prenatal Vit w/Ln-Yvimbzzca-MW (PNV PO) Take by mouth, Historical Med             No discharge procedures on file.     PDMP Review     None          ED Provider  Electronically Signed by           Justin Forbes PA-C  08/06/23 2721

## 2023-08-06 NOTE — ED ATTENDING ATTESTATION
8/6/2023  IThom MD, saw and evaluated the patient. I have discussed the patient with the resident/non-physician practitioner and agree with the resident's/non-physician practitioner's findings, Plan of Care, and MDM as documented in the resident's/non-physician practitioner's note, except where noted. All available labs and Radiology studies were reviewed. I was present for key portions of any procedure(s) performed by the resident/non-physician practitioner and I was immediately available to provide assistance. At this point I agree with the current assessment done in the Emergency Department. I have conducted an independent evaluation of this patient a history and physical is as follows:    ED Course     Bedside u/s done with PA and showed fetal movement and numerous measurements of FHTs done and showed HR 120s-125. Discussed with PA patient could get COVID flu RSV swab could discuss doing chest x-ray and could get labs with evaluation for CBC and D-dimer to restratify for PE given her dyspnea worsening with deep breath and discussed with patient risk benefit of PE study. Continue discussions with OB and also had discussion with patient about need for having symptoms need to go to [facility] since having no obstetrical care at [facility] limits ability for [facility] care in an emergency.     Critical Care Time  Procedures

## 2023-08-08 ENCOUNTER — TELEPHONE (OUTPATIENT)
Dept: OBGYN CLINIC | Facility: CLINIC | Age: 33
End: 2023-08-08

## 2023-08-08 NOTE — TELEPHONE ENCOUNTER
OB pt was in the ED and was diagnosed w/ COVID. She was advised to let us know. She feels fine but is very weak.

## 2023-08-17 PROBLEM — Z3A.36 36 WEEKS GESTATION OF PREGNANCY: Status: ACTIVE | Noted: 2023-02-28

## 2023-08-17 NOTE — PATIENT INSTRUCTIONS
Pregnancy at 28 to 38 Weeks   AMBULATORY CARE:   Changes happening with your body: You are considered full term at the beginning of 37 weeks. Your breathing may be easier if your baby has moved down into a head-down position. You may need to urinate more often because the baby may be pressing on your bladder. You may also feel more discomfort and get tired easily. Seek care immediately if:   You develop a severe headache that does not go away. You have new or increased vision changes, such as blurred or spotted vision. You have new or increased swelling in your face or hands. You have vaginal spotting or bleeding. Your water broke or you feel warm water gushing or trickling from your vagina. Call your obstetrician if:   You have more than 5 contractions in 1 hour. You notice any changes in your baby's movements. You have abdominal cramps, pressure, or tightening. You have a change in vaginal discharge. You have chills or a fever. You have vaginal itching, burning, or pain. You have yellow, green, white, or foul-smelling vaginal discharge. You have pain or burning when you urinate, less urine than usual, or pink or bloody urine. You have questions or concerns about your condition or care. How to care for yourself at this stage of your pregnancy:       Eat a variety of healthy foods. Healthy foods include fruits, vegetables, whole-grain breads, low-fat dairy foods, beans, lean meats, and fish. Drink liquids as directed. Ask how much liquid to drink each day and which liquids are best for you. Limit caffeine to less than 200 milligrams each day. Limit your intake of fish to 2 servings each week. Choose fish low in mercury such as canned light tuna, shrimp, salmon, cod, or tilapia. Do not  eat fish high in mercury such as swordfish, tilefish, chele mackerel, and shark. Take prenatal vitamins as directed.   Your need for certain vitamins and minerals, such as folic acid, increases during pregnancy. Prenatal vitamins provide some of the extra vitamins and minerals you need. Prenatal vitamins may also help to decrease the risk of certain birth defects. Rest as needed. Put your feet up if you have swelling in your ankles and feet. Talk to your healthcare provider about exercise. Moderate exercise can help you stay fit. Your healthcare provider will help you plan an exercise program that is safe for you during pregnancy. Do not smoke. Smoking increases your risk of a miscarriage and other health problems during your pregnancy. Smoking can cause your baby to be born early or weigh less at birth. Ask your healthcare provider for information if you need help quitting. Do not drink alcohol. Alcohol passes from your body to your baby through the placenta. It can affect your baby's brain development and cause fetal alcohol syndrome (FAS). FAS is a group of conditions that causes mental, behavior, and growth problems. Talk to your healthcare provider before you take any medicines. Many medicines may harm your baby if you take them when you are pregnant. Do not take any medicines, vitamins, herbs, or supplements without first talking to your healthcare provider. Never use illegal or street drugs (such as marijuana or cocaine) while you are pregnant. Safety tips during pregnancy:   Avoid hot tubs and saunas. Do not use a hot tub or sauna while you are pregnant, especially during your first trimester. Hot tubs and saunas may raise your baby's temperature and increase the risk of birth defects. Avoid toxoplasmosis. This is an infection caused by eating raw meat or being around infected cat feces. It can cause birth defects, miscarriages, and other problems. Wash your hands after you touch raw meat. Make sure any meat is well-cooked before you eat it. Avoid raw eggs and unpasteurized milk.  Use gloves or ask someone else to clean your cat's litter box while you are pregnant. Ask your healthcare provider about travel. The most comfortable time to travel is during the second trimester. Ask your provider if you can travel after 36 weeks. You may not be able to travel in an airplane after 36 weeks. He or she may also recommend you avoid long road trips. Changes happening with your baby:  By 38 weeks, your baby may weigh between 6 and 9 pounds. Your baby may be about 14 inches long from the top of the head to the rump (baby's bottom). Your baby hears well enough to know your voice. As your baby gets larger, you may feel fewer kicks and more stretching and rolling. Your baby may move into a head-down position. Your baby will also rest lower in your abdomen. What you need to know about prenatal care: Your healthcare provider will check your blood pressure and weight. You may also need the following:  A urine test  may also be done to check for sugar and protein. These can be signs of gestational diabetes or infection. Protein in your urine may also be a sign of preeclampsia. Preeclampsia is a condition that can develop during week 20 or later of your pregnancy. It causes high blood pressure, and it can cause problems with your kidneys and other organs. A gestational diabetes screen  may be done. Your healthcare provider may order either a 1-step or 2-step oral glucose tolerance test (OGTT). 1-step OGTT:  Your blood sugar level will be tested after you have not eaten for 8 hours (fasting). You will then be given a glucose drink. Your level will be tested again 1 hour and 2 hours after you finish the drink. 2-step OGTT:  You do not have to fast for the first part of the test. You will have the glucose drink at any time of day. Your blood sugar level will be checked 1 hour later. If your blood sugar is higher than a certain level, another test will be ordered. You will fast and your blood sugar level will be tested. You will have the glucose drink. Your blood will be tested again 1 hour, 2 hours, and 3 hours after you finish the glucose drink. A blood test  may be done to check for anemia (low iron level). A Tdap vaccine  may be recommended by your healthcare provider. A group B strep test  is a test that is done to check for group B strep infection. Group B strep is a type of bacteria that may be found in the vagina or rectum. It can be passed to your baby during delivery if you have it. Your healthcare provider will take swab your vagina or rectum and send the sample to the lab for tests. Fundal height  is a measurement of your uterus to check your baby's growth. This number is usually the same as the number of weeks that you have been pregnant. Your healthcare provider may also check your baby's position. Your baby's heart rate  will be checked. Follow up with your obstetrician as directed:  Write down your questions so you remember to ask them during your visits. © Copyright Radha [employer] 2022 Information is for End User's use only and may not be sold, redistributed or otherwise used for commercial purposes. The above information is an  only. It is not intended as medical advice for individual conditions or treatments. Talk to your doctor, nurse or pharmacist before following any medical regimen to see if it is safe and effective for you. Kick Counts in Pregnancy   AMBULATORY CARE:   Kick counts  measure how much your baby is moving in your womb. A kick from your baby can be felt as a twist, turn, swish, roll, or jab. It is common to feel your baby kicking at 26 to 28 weeks of pregnancy. You may feel your baby kick as early as 20 weeks of pregnancy. You may want to start counting at 28 weeks. Contact your doctor immediately if:   You feel a change in the number of kicks or movements of your baby. You feel fewer than 10 kicks within 2 hours. You have questions or concerns about your baby's movements.     Why measure kick counts:  Your baby's movement may provide information about your baby's health. He or she may move less, or not at all, if there are problems. Your baby may move less if he or she is not getting enough oxygen or nutrition from the placenta. Do not smoke while you are pregnant. Smoking decreases the amount of oxygen that gets to your baby. Talk to your healthcare provider if you need help to quit smoking. Tell your healthcare provider as soon as you feel a change in your baby's movements. When to measure kick counts:   Measure kick counts at the same time every day. Measure kick counts when your baby is awake and most active. Your baby may be most active in the evening. How to measure kick counts:  Check that your baby is awake before you measure kick counts. You can wake up your baby by lightly pushing on your belly, walking, or drinking something cold. Your healthcare provider may tell you different ways to measure kick counts. You may be told to do the following:  Use a chart or clock to keep track of the time you start and finish counting. Sit in a chair or lie on your left side. Place your hands on the largest part of your belly. Count until you reach 10 kicks. Write down how much time it takes to count 10 kicks. It may take 30 minutes to 2 hours to count 10 kicks. It should not take more than 2 hours to count 10 kicks. Follow up with your doctor as directed:  Write down your questions so you remember to ask them during your visits. © Copyright [employer] 2022 Information is for End User's use only and may not be sold, redistributed or otherwise used for commercial purposes. The above information is an  only. It is not intended as medical advice for individual conditions or treatments. Talk to your doctor, nurse or pharmacist before following any medical regimen to see if it is safe and effective for you.       Perineal Massage    Perineal massage is recommended starting after 34 weeks in order to reduce risks of perineal tearing during childbirth. You have been provided and instructional sheet in your yellow 28 week prenatal packet. Early Labor Signs   AMBULATORY CARE:   Early labor signs and symptoms  are the changes in your body that signal your baby is getting ready to be delivered. Early labor signs can happen weeks, days or hours before delivery. Call 911 for any of the following: You have heavy vaginal bleeding. You cannot get to the hospital before the baby starts to come out. Seek care immediately if:   You have regular, painful contractions that are less than 5 minutes apart and last 30 to 70 seconds each. You have a constant trickle or sudden gush of clear fluid from your vagina. You notice a sudden decrease in your baby's movement. Contact your obstetrician or healthcare provider if:   You have pain in your lower back or abdomen that does not get better when you change positions. You have bloody mucus or show. You have questions or concerns about your condition or care. Early labor signs and symptoms:   Lightening  occurs when your baby drops inside your pelvis. You may feel increased pressure in your pelvis. This may happen a few weeks to a few hours before your labor begins. Contractions  are cramps and tightening that occur in your uterus to help move the baby through your birth canal. Contractions occur regularly and more often each time. Each one lasts about 30 to 70 seconds, and gets stronger until you deliver your baby. Contractions do not go away with movement. The pain usually starts in your lower back and moves to your abdomen. Effacement  occurs when your cervix softens and thins, so it can easily open for the baby. You will not be able to feel effacement. Your healthcare provider will examine your cervix for effacement. Dilation  is widening of your cervix.  Your healthcare provider will examine your cervix for dilation. Your cervix may start to dilate weeks before your baby is delivered. Your cervix will be fully opened and ready for delivery when it is dilated to 10 centimeters. Increased discharge  from your vagina may occur. It may be brown, pink, clear, or slightly bloody. This discharge may also be called bloody show. Bloody show is a mucus plug that forms and blocks your cervix during pregnancy. The discharge may mean that your cervix is opening up and getting ready for delivery. Rupture of membranes  is a sudden release of clear fluid from your vagina. Ruptured membranes means your water broke. Your healthcare provider may need to break your water if it does not happen on its own. False labor: You may have false labor signs, which are also called [place] contractions. False labor is common and may happen several weeks or days before your actual labor. The contractions are not regular, and do not get closer together. The pain is usually mild, does not worsen, and is felt only in front. [place] contractions may happen later in the day, and stop after you change position, walk, or rest.  Follow up with your obstetrician or healthcare provider as directed:  Write down your questions so you remember to ask them during your visit. © Copyright Radha Brand 2022 Information is for End User's use only and may not be sold, redistributed or otherwise used for commercial purposes. The above information is an  only. It is not intended as medical advice for individual conditions or treatments. Talk to your doctor, nurse or pharmacist before following any medical regimen to see if it is safe and effective for you.

## 2023-08-17 NOTE — ASSESSMENT & PLAN NOTE
Emily Solis is a 29 y.o. [id number]  36w2d who presents for routine PNV. Advised to stop ASA as of today   Continue with PNV  28 week labs reviewed: nml  TWG 14.5 kg (32 lb)   Denies OB complaints. Good fetal movement. Feels period like cramping at times. Denies contractions,  leakage of fluid or vaginal bleeding. Mentions urinary frequency and only voiding in small amounts, UA & C&S sent to lab, advised to increase hydration   Tdap vaccine completed  Reviewed  FKCs.    Advised to continue  Perineal massage at 34-36 weeks   Pregnancy Essential guide and Baby and Me web site recommended

## 2023-08-18 ENCOUNTER — ROUTINE PRENATAL (OUTPATIENT)
Dept: OBGYN CLINIC | Facility: CLINIC | Age: 33
End: 2023-08-18
Payer: COMMERCIAL

## 2023-08-18 VITALS
HEIGHT: 69 IN | WEIGHT: 260 LBS | BODY MASS INDEX: 38.51 KG/M2 | DIASTOLIC BLOOD PRESSURE: 80 MMHG | SYSTOLIC BLOOD PRESSURE: 122 MMHG

## 2023-08-18 DIAGNOSIS — Z34.83 PRENATAL CARE, SUBSEQUENT PREGNANCY, THIRD TRIMESTER: Primary | ICD-10-CM

## 2023-08-18 DIAGNOSIS — R35.0 URINE FREQUENCY: ICD-10-CM

## 2023-08-18 LAB
SL AMB  POCT GLUCOSE, UA: NORMAL
SL AMB POCT URINE PROTEIN: NORMAL

## 2023-08-18 PROCEDURE — PNV: Performed by: OBSTETRICS & GYNECOLOGY

## 2023-08-18 PROCEDURE — 87086 URINE CULTURE/COLONY COUNT: CPT | Performed by: OBSTETRICS & GYNECOLOGY

## 2023-08-18 PROCEDURE — 81002 URINALYSIS NONAUTO W/O SCOPE: CPT | Performed by: OBSTETRICS & GYNECOLOGY

## 2023-08-18 PROCEDURE — 87077 CULTURE AEROBIC IDENTIFY: CPT | Performed by: OBSTETRICS & GYNECOLOGY

## 2023-08-18 PROCEDURE — 87147 CULTURE TYPE IMMUNOLOGIC: CPT | Performed by: OBSTETRICS & GYNECOLOGY

## 2023-08-18 PROCEDURE — 87150 DNA/RNA AMPLIFIED PROBE: CPT | Performed by: OBSTETRICS & GYNECOLOGY

## 2023-08-18 NOTE — PROGRESS NOTES
Problem   36 Weeks Gestation of Pregnancy    Blood Type: A. +       Pap 01/23/2019 Neg, Pap& GC/CT neg x 3  PN1 Labs: normal  H&H: 12.5/37.7  28 Week Labs: nml    Level 1:3/6/2023  Level 2:4/25/23  Tdap: completed    GBS swab: GBS bacteruria    Blue folder: Given   Yellow folder: given    Breast pump order: order submitted  L&D forms: will bring    Delivery consent: signed  IOL:                36 weeks gestation of pregnancy  Laxmi Roldan is a 29 y.o. [id number]  36w2d who presents for routine PNV. Advised to stop ASA as of today   Continue with PNV  28 week labs reviewed: nml  TWG 14.5 kg (32 lb)   Denies OB complaints. Good fetal movement. Feels period like cramping at times. Denies contractions,  leakage of fluid or vaginal bleeding. Mentions urinary frequency and only voiding in small amounts, UA & C&S sent to lab, advised to increase hydration   Tdap vaccine completed  Reviewed  FKCs.    Advised to continue  Perineal massage at 34-36 weeks   Pregnancy Essential guide and Baby and Me web site recommended

## 2023-08-18 NOTE — PROGRESS NOTES
[id number] 36w3d  Pap 03/01/2023. Negative HPV Negative  GC/CT:3/1/2023 Neg/ neg   PN1 Labs: 2/25/2023  Patient has an early glucose on 3/11/2023 and it was normal .  Blood Type: A  Positive  :   MFM Level 1:3/6/2023  MFM Level 2:4/21/2023  AFP: 4/14/2023  28 Week Labs:6/1/2023 Abnormal   3 hour glucose was done on 6/29/2023 Abnormal   TDap:  Flu:  GBS:   @ today's visit   Blue Folder:  Given   Yellow Folder:  Given   Ped Referral :  Given   Breast pump:  L&D forms:  Done   Delivery consent:  Done     Patient reports positive fetal movements with no lost of fluids and no contractions today   Patient reports that she had some contractions yesterday .

## 2023-08-19 LAB — BACTERIA UR CULT: ABNORMAL

## 2023-08-21 LAB — GP B STREP DNA SPEC QL NAA+PROBE: NEGATIVE

## 2023-08-22 DIAGNOSIS — O23.43 UTI (URINARY TRACT INFECTION) DURING PREGNANCY, THIRD TRIMESTER: Primary | ICD-10-CM

## 2023-08-22 RX ORDER — AMOXICILLIN 500 MG/1
500 CAPSULE ORAL EVERY 8 HOURS SCHEDULED
Qty: 15 CAPSULE | Refills: 0 | Status: SHIPPED | OUTPATIENT
Start: 2023-08-22 | End: 2023-08-28

## 2023-08-22 NOTE — PROGRESS NOTES
Script sent in for amoxil for UTI in pregnancy , message to pt if she is still having symptoms to start the medication and complete course

## 2023-08-25 ENCOUNTER — ROUTINE PRENATAL (OUTPATIENT)
Dept: OBGYN CLINIC | Facility: CLINIC | Age: 33
End: 2023-08-25

## 2023-08-25 VITALS — BODY MASS INDEX: 38.54 KG/M2 | WEIGHT: 261 LBS | SYSTOLIC BLOOD PRESSURE: 110 MMHG | DIASTOLIC BLOOD PRESSURE: 78 MMHG

## 2023-08-25 DIAGNOSIS — Z34.83 PRENATAL CARE, SUBSEQUENT PREGNANCY, THIRD TRIMESTER: Primary | ICD-10-CM

## 2023-08-25 DIAGNOSIS — Z3A.37 37 WEEKS GESTATION OF PREGNANCY: ICD-10-CM

## 2023-08-25 LAB
SL AMB  POCT GLUCOSE, UA: NEGATIVE
SL AMB POCT URINE PROTEIN: NEGATIVE

## 2023-08-25 NOTE — PROGRESS NOTES
Patient denies LOF and bleeding   Reports mild cramping, began a week ago   positive fetal movement   Delivery consent signed  GBS Negative  Declined cervical check

## 2023-08-25 NOTE — PATIENT INSTRUCTIONS
Pregnancy at 28 to 38 Weeks   AMBULATORY CARE:   Changes happening with your body: You are considered full term at the beginning of 37 weeks. Your breathing may be easier if your baby has moved down into a head-down position. You may need to urinate more often because the baby may be pressing on your bladder. You may also feel more discomfort and get tired easily. Seek care immediately if:   You develop a severe headache that does not go away. You have new or increased vision changes, such as blurred or spotted vision. You have new or increased swelling in your face or hands. You have vaginal spotting or bleeding. Your water broke or you feel warm water gushing or trickling from your vagina. Call your obstetrician if:   You have more than 5 contractions in 1 hour. You notice any changes in your baby's movements. You have abdominal cramps, pressure, or tightening. You have a change in vaginal discharge. You have chills or a fever. You have vaginal itching, burning, or pain. You have yellow, green, white, or foul-smelling vaginal discharge. You have pain or burning when you urinate, less urine than usual, or pink or bloody urine. You have questions or concerns about your condition or care. How to care for yourself at this stage of your pregnancy:       Eat a variety of healthy foods. Healthy foods include fruits, vegetables, whole-grain breads, low-fat dairy foods, beans, lean meats, and fish. Drink liquids as directed. Ask how much liquid to drink each day and which liquids are best for you. Limit caffeine to less than 200 milligrams each day. Limit your intake of fish to 2 servings each week. Choose fish low in mercury such as canned light tuna, shrimp, salmon, cod, or tilapia. Do not  eat fish high in mercury such as swordfish, tilefish, chele mackerel, and shark. Take prenatal vitamins as directed.   Your need for certain vitamins and minerals, such as folic acid, increases during pregnancy. Prenatal vitamins provide some of the extra vitamins and minerals you need. Prenatal vitamins may also help to decrease the risk of certain birth defects. Rest as needed. Put your feet up if you have swelling in your ankles and feet. Talk to your healthcare provider about exercise. Moderate exercise can help you stay fit. Your healthcare provider will help you plan an exercise program that is safe for you during pregnancy. Do not smoke. Smoking increases your risk of a miscarriage and other health problems during your pregnancy. Smoking can cause your baby to be born early or weigh less at birth. Ask your healthcare provider for information if you need help quitting. Do not drink alcohol. Alcohol passes from your body to your baby through the placenta. It can affect your baby's brain development and cause fetal alcohol syndrome (FAS). FAS is a group of conditions that causes mental, behavior, and growth problems. Talk to your healthcare provider before you take any medicines. Many medicines may harm your baby if you take them when you are pregnant. Do not take any medicines, vitamins, herbs, or supplements without first talking to your healthcare provider. Never use illegal or street drugs (such as marijuana or cocaine) while you are pregnant. Safety tips during pregnancy:   Avoid hot tubs and saunas. Do not use a hot tub or sauna while you are pregnant, especially during your first trimester. Hot tubs and saunas may raise your baby's temperature and increase the risk of birth defects. Avoid toxoplasmosis. This is an infection caused by eating raw meat or being around infected cat feces. It can cause birth defects, miscarriages, and other problems. Wash your hands after you touch raw meat. Make sure any meat is well-cooked before you eat it. Avoid raw eggs and unpasteurized milk.  Use gloves or ask someone else to clean your cat's litter box while you are pregnant. Ask your healthcare provider about travel. The most comfortable time to travel is during the second trimester. Ask your provider if you can travel after 36 weeks. You may not be able to travel in an airplane after 36 weeks. He or she may also recommend you avoid long road trips. Changes happening with your baby:  By 38 weeks, your baby may weigh between 6 and 9 pounds. Your baby may be about 14 inches long from the top of the head to the rump (baby's bottom). Your baby hears well enough to know your voice. As your baby gets larger, you may feel fewer kicks and more stretching and rolling. Your baby may move into a head-down position. Your baby will also rest lower in your abdomen. What you need to know about prenatal care: Your healthcare provider will check your blood pressure and weight. You may also need the following:  A urine test  may also be done to check for sugar and protein. These can be signs of gestational diabetes or infection. Protein in your urine may also be a sign of preeclampsia. Preeclampsia is a condition that can develop during week 20 or later of your pregnancy. It causes high blood pressure, and it can cause problems with your kidneys and other organs. A gestational diabetes screen  may be done. Your healthcare provider may order either a 1-step or 2-step oral glucose tolerance test (OGTT). 1-step OGTT:  Your blood sugar level will be tested after you have not eaten for 8 hours (fasting). You will then be given a glucose drink. Your level will be tested again 1 hour and 2 hours after you finish the drink. 2-step OGTT:  You do not have to fast for the first part of the test. You will have the glucose drink at any time of day. Your blood sugar level will be checked 1 hour later. If your blood sugar is higher than a certain level, another test will be ordered. You will fast and your blood sugar level will be tested. You will have the glucose drink. Your blood will be tested again 1 hour, 2 hours, and 3 hours after you finish the glucose drink. A blood test  may be done to check for anemia (low iron level). A Tdap vaccine  may be recommended by your healthcare provider. A group B strep test  is a test that is done to check for group B strep infection. Group B strep is a type of bacteria that may be found in the vagina or rectum. It can be passed to your baby during delivery if you have it. Your healthcare provider will take swab your vagina or rectum and send the sample to the lab for tests. Fundal height  is a measurement of your uterus to check your baby's growth. This number is usually the same as the number of weeks that you have been pregnant. Your healthcare provider may also check your baby's position. Your baby's heart rate  will be checked. Follow up with your obstetrician as directed:  Write down your questions so you remember to ask them during your visits. ©  [employer] 2022 Information is for End User's use only and may not be sold, redistributed or otherwise used for commercial purposes. The above information is an  only. It is not intended as medical advice for individual conditions or treatments. Talk to your doctor, nurse or pharmacist before following any medical regimen to see if it is safe and effective for you.  Labor   AMBULATORY CARE:    (premature) labor  occurs when the uterus contracts and your cervix opens earlier than normal. The cervix is the opening of your uterus.  labor happens after the 20th week of pregnancy but before the 37th week. You may have premature rupture of membranes (PROM). PROM means your water broke before labor began. An early labor could cause you to have your baby before he or she is ready to be born. Common signs and symptoms include the following: You may not know that you are having  labor.  It is common to have  contractions (tightening and relaxing of the uterus) and not notice them. The following are signs and symptoms that suggest a  labor:  Contractions that get stronger and closer together    Changes in vaginal discharge, such as more discharge or discharge that is watery or bloody     Low back pain     Pressure in the lower abdomen     Vaginal spotting or bleeding    Call your local emergency number (911 in the [address]) if:   You see or feel like there is something in your vagina. Call your doctor if:   You have bright red, painless vaginal bleeding. Your symptoms do not get better or they get worse. Your water broke or you feel warm water gushing or trickling from your vagina. You have contractions that get stronger and closer together for more than 1 hour. You notice a decrease in your baby's movement. You have abdominal cramps, pressure, or tightening. You have a change in vaginal discharge. You have a fever. You have burning when you urinate or you are urinating less than is normal for you. You have questions or concerns about your condition or care. How  labor is diagnosed: You may have one or more of the following tests to check for  labor:  A pelvic exam  is also called an internal or vaginal exam. During a pelvic exam, your healthcare provider will gently put a warmed speculum into your vagina. A speculum is a tool that opens your vagina. This lets your healthcare provider see if your cervix is opening. A vaginal ultrasound  uses sound waves to show pictures of your cervix and your baby inside your uterus. During this test, a small tube is placed into your vagina. This test will help your healthcare provider see if your cervix is opening. A fetal ultrasound  uses sound waves to show pictures of your baby inside your uterus. The movement, heart rate, and position of your baby can also be seen.          A fetal fibronectin test  checks for a protein called fetal fibronectin in the cervix or vagina. Normally, there is no protein in cervical and vaginal secretions until the 20th week of pregnancy up to the end of pregnancy. Blood and urine tests  may be done to look for signs of infection. Treatment for  labor  may delay delivery. You may need any of the following:  Bed rest  may be recommended. You may need to lie on your left side, which improves circulation to the uterus and baby. Your healthcare provider will tell you when it is okay to get out of bed. Medicine  may be given to stop contractions if your baby is not ready to be born. You may also need certain medicines if your  labor cannot be stopped and your healthcare provider thinks you will have your baby early. These medicines help your baby's lungs, brain, and digestive organs mature. They also help decrease your baby's risk of being born with cerebral palsy. If you have PROM, fluid from your vagina or rectum will be checked for a strep infection. You may be given antibiotics to prevent a strep infection during delivery. Antibiotics may also be used to prevent labor from starting. You may also need steroids to decrease the risk for complications due to  labor. Self-care:   Rest  as much as possible. You may need to lie on your left side to improve circulation to the uterus and baby. You may be able to prevent  labor by resting and reducing your physical activity. Ask your healthcare provider about activities that are safe for you to do. Your healthcare provider or obstetrician may recommend that you avoid sexual intercourse. Ask your healthcare provider if exercise is safe. Drink liquids as directed. Ask how much liquid to drink each day and which liquids are best for you. Do not smoke. Your baby may not grow well and he or she may weigh less at birth if you smoke during pregnancy. Smoking also increases the risk that your baby will be born too early.  Nicotine and other chemicals in cigarettes and cigars can cause lung damage. Ask your healthcare provider for information if you currently smoke and need help to quit. E-cigarettes or smokeless tobacco still contain nicotine. Talk to your healthcare provider before you use these products. Do not drink alcohol. Alcohol may harm your unborn baby and cause  labor. Maintain a healthy weight. A healthy weight may prevent  labor. Ask your healthcare provider how much weight you should gain during your pregnancy. Follow up with your doctor as directed:  Write down your questions so you remember to ask them during your visits. © Copyright [employer]  Information is for End User's use only and may not be sold, redistributed or otherwise used for commercial purposes. The above information is an  only. It is not intended as medical advice for individual conditions or treatments. Talk to your doctor, nurse or pharmacist before following any medical regimen to see if it is safe and effective for you.

## 2023-08-25 NOTE — ASSESSMENT & PLAN NOTE
Juan Wong is a 29 y.o.   37w3d who presents for routine PNV. Prepregnancy BMI 33.65 with a goal weight gain 5 kg (11 lb)-9 kg (19 lb). TWG 15 kg (33 lb)  Denies OB complaints. Good fetal movement. Denies CTX/LOF/VB. Declined 39 week IOL. Reviewed labor precautions and FKCs.    Encouraged perineal massages   Pregnancy Essential guide and Baby and Me web site recommended

## 2023-08-25 NOTE — PROGRESS NOTES
Problem   37 Weeks Gestation of Pregnancy    Blood Type: A. +       Pap 01/23/2019 Neg, Pap& GC/CT neg x 3  PN1 Labs: normal  H&H: 12.5/37.7  28 Week Labs: nml    Level 1:3/6/2023  Level 2:4/25/23  Tdap: completed    GBS swab: GBS bacteruria    Blue folder: Given   Yellow folder: given    Breast pump order: order submitted  L&D forms: will bring    Delivery consent: signed  IOL: declined               37 weeks gestation of pregnancy  Jennifer Fierro is a 29 y.o. [id number]  37w3d who presents for routine PNV. Prepregnancy BMI 33.65 with a goal weight gain 5 kg (11 lb)-9 kg (19 lb). TWG 15 kg (33 lb)  Denies OB complaints. Good fetal movement. Denies CTX/LOF/VB. Declined 39 week IOL. Reviewed labor precautions and FKCs.    Encouraged perineal massages   Pregnancy Essential guide and Baby and Me web site recommended

## 2023-08-28 ENCOUNTER — ULTRASOUND (OUTPATIENT)
Dept: PERINATAL CARE | Facility: OTHER | Age: 33
End: 2023-08-28
Payer: COMMERCIAL

## 2023-08-28 VITALS
SYSTOLIC BLOOD PRESSURE: 124 MMHG | HEIGHT: 69 IN | DIASTOLIC BLOOD PRESSURE: 86 MMHG | HEART RATE: 100 BPM | BODY MASS INDEX: 37.98 KG/M2 | WEIGHT: 256.4 LBS

## 2023-08-28 DIAGNOSIS — Z3A.37 37 WEEKS GESTATION OF PREGNANCY: ICD-10-CM

## 2023-08-28 DIAGNOSIS — Z36.89 ENCOUNTER FOR ULTRASOUND TO ASSESS FETAL GROWTH: ICD-10-CM

## 2023-08-28 DIAGNOSIS — O99.213 OBESITY AFFECTING PREGNANCY IN THIRD TRIMESTER: Primary | ICD-10-CM

## 2023-08-28 DIAGNOSIS — Z36.2 ENCOUNTER FOR FOLLOW-UP ULTRASOUND OF FETAL ANATOMY: ICD-10-CM

## 2023-08-28 DIAGNOSIS — Z87.59 HISTORY OF GESTATIONAL HYPERTENSION: ICD-10-CM

## 2023-08-28 PROCEDURE — 76816 OB US FOLLOW-UP PER FETUS: CPT | Performed by: STUDENT IN AN ORGANIZED HEALTH CARE EDUCATION/TRAINING PROGRAM

## 2023-08-28 NOTE — PROGRESS NOTES
[address]: Ms. Francella Apgar was seen today for fetal growth and followup missed anatomy ultrasound. See ultrasound report under "OB Procedures" tab. Please don't hesitate to contact our office with any concerns or questions.   -Gordon Bright MD

## 2023-08-29 PROBLEM — Z3A.38 38 WEEKS GESTATION OF PREGNANCY: Status: ACTIVE | Noted: 2023-02-28

## 2023-08-29 NOTE — ASSESSMENT & PLAN NOTE
She feels well. She denies LOF/CTX/VB. She did not voice any concerns. Discussed fetal kick counting. She was encouraged to continue with her perineal/vaginal massages to prevent lacerations during the labor process. She is still declining IOL.

## 2023-08-30 ENCOUNTER — ROUTINE PRENATAL (OUTPATIENT)
Dept: OBGYN CLINIC | Facility: CLINIC | Age: 33
End: 2023-08-30

## 2023-08-30 VITALS
DIASTOLIC BLOOD PRESSURE: 82 MMHG | WEIGHT: 257 LBS | HEART RATE: 94 BPM | SYSTOLIC BLOOD PRESSURE: 118 MMHG | BODY MASS INDEX: 37.95 KG/M2

## 2023-08-30 DIAGNOSIS — Z34.83 PRENATAL CARE, SUBSEQUENT PREGNANCY, THIRD TRIMESTER: Primary | ICD-10-CM

## 2023-08-30 DIAGNOSIS — Z3A.38 38 WEEKS GESTATION OF PREGNANCY: ICD-10-CM

## 2023-08-30 DIAGNOSIS — Z87.59 HISTORY OF GESTATIONAL HYPERTENSION: ICD-10-CM

## 2023-08-30 NOTE — PROGRESS NOTES
Problem   38 Weeks Gestation of Pregnancy    Blood Type: A. +       Pap 01/23/2019 Neg, Pap& GC/CT neg x 3  PN1 Labs: normal  H&H: 12.5/37.7  28 Week Labs: nml    Level 1:3/6/2023  Level 2:4/25/23  Tdap: completed    GBS swab: GBS bacteruria    Blue folder: Given   Yellow folder: given    Breast pump order: order submitted  L&D forms: will bring    Delivery consent: signed  IOL: declined               38 weeks gestation of pregnancy  She feels well. She denies LOF/CTX/VB. She did not voice any concerns. Discussed fetal kick counting. She was encouraged to continue with her perineal/vaginal massages to prevent lacerations during the labor process. She is still declining IOL.

## 2023-08-30 NOTE — PATIENT INSTRUCTIONS
Pregnancy at 28 to 38 Weeks   AMBULATORY CARE:   Changes happening with your body: You are considered full term at the beginning of 37 weeks. Your breathing may be easier if your baby has moved down into a head-down position. You may need to urinate more often because the baby may be pressing on your bladder. You may also feel more discomfort and get tired easily. Seek care immediately if:   You develop a severe headache that does not go away. You have new or increased vision changes, such as blurred or spotted vision. You have new or increased swelling in your face or hands. You have vaginal spotting or bleeding. Your water broke or you feel warm water gushing or trickling from your vagina. Call your obstetrician if:   You have more than 5 contractions in 1 hour. You notice any changes in your baby's movements. You have abdominal cramps, pressure, or tightening. You have a change in vaginal discharge. You have chills or a fever. You have vaginal itching, burning, or pain. You have yellow, green, white, or foul-smelling vaginal discharge. You have pain or burning when you urinate, less urine than usual, or pink or bloody urine. You have questions or concerns about your condition or care. How to care for yourself at this stage of your pregnancy:       Eat a variety of healthy foods. Healthy foods include fruits, vegetables, whole-grain breads, low-fat dairy foods, beans, lean meats, and fish. Drink liquids as directed. Ask how much liquid to drink each day and which liquids are best for you. Limit caffeine to less than 200 milligrams each day. Limit your intake of fish to 2 servings each week. Choose fish low in mercury such as canned light tuna, shrimp, salmon, cod, or tilapia. Do not  eat fish high in mercury such as swordfish, tilefish, chele mackerel, and shark. Take prenatal vitamins as directed.   Your need for certain vitamins and minerals, such as folic acid, increases during pregnancy. Prenatal vitamins provide some of the extra vitamins and minerals you need. Prenatal vitamins may also help to decrease the risk of certain birth defects. Rest as needed. Put your feet up if you have swelling in your ankles and feet. Talk to your healthcare provider about exercise. Moderate exercise can help you stay fit. Your healthcare provider will help you plan an exercise program that is safe for you during pregnancy. Do not smoke. Smoking increases your risk of a miscarriage and other health problems during your pregnancy. Smoking can cause your baby to be born early or weigh less at birth. Ask your healthcare provider for information if you need help quitting. Do not drink alcohol. Alcohol passes from your body to your baby through the placenta. It can affect your baby's brain development and cause fetal alcohol syndrome (FAS). FAS is a group of conditions that causes mental, behavior, and growth problems. Talk to your healthcare provider before you take any medicines. Many medicines may harm your baby if you take them when you are pregnant. Do not take any medicines, vitamins, herbs, or supplements without first talking to your healthcare provider. Never use illegal or street drugs (such as marijuana or cocaine) while you are pregnant. Safety tips during pregnancy:   Avoid hot tubs and saunas. Do not use a hot tub or sauna while you are pregnant, especially during your first trimester. Hot tubs and saunas may raise your baby's temperature and increase the risk of birth defects. Avoid toxoplasmosis. This is an infection caused by eating raw meat or being around infected cat feces. It can cause birth defects, miscarriages, and other problems. Wash your hands after you touch raw meat. Make sure any meat is well-cooked before you eat it. Avoid raw eggs and unpasteurized milk.  Use gloves or ask someone else to clean your cat's litter box while you are pregnant. Ask your healthcare provider about travel. The most comfortable time to travel is during the second trimester. Ask your provider if you can travel after 36 weeks. You may not be able to travel in an airplane after 36 weeks. He or she may also recommend you avoid long road trips. Changes happening with your baby:  By 38 weeks, your baby may weigh between 6 and 9 pounds. Your baby may be about 14 inches long from the top of the head to the rump (baby's bottom). Your baby hears well enough to know your voice. As your baby gets larger, you may feel fewer kicks and more stretching and rolling. Your baby may move into a head-down position. Your baby will also rest lower in your abdomen. What you need to know about prenatal care: Your healthcare provider will check your blood pressure and weight. You may also need the following:  A urine test  may also be done to check for sugar and protein. These can be signs of gestational diabetes or infection. Protein in your urine may also be a sign of preeclampsia. Preeclampsia is a condition that can develop during week 20 or later of your pregnancy. It causes high blood pressure, and it can cause problems with your kidneys and other organs. A gestational diabetes screen  may be done. Your healthcare provider may order either a 1-step or 2-step oral glucose tolerance test (OGTT). 1-step OGTT:  Your blood sugar level will be tested after you have not eaten for 8 hours (fasting). You will then be given a glucose drink. Your level will be tested again 1 hour and 2 hours after you finish the drink. 2-step OGTT:  You do not have to fast for the first part of the test. You will have the glucose drink at any time of day. Your blood sugar level will be checked 1 hour later. If your blood sugar is higher than a certain level, another test will be ordered. You will fast and your blood sugar level will be tested. You will have the glucose drink. Your blood will be tested again 1 hour, 2 hours, and 3 hours after you finish the glucose drink. A blood test  may be done to check for anemia (low iron level). A Tdap vaccine  may be recommended by your healthcare provider. A group B strep test  is a test that is done to check for group B strep infection. Group B strep is a type of bacteria that may be found in the vagina or rectum. It can be passed to your baby during delivery if you have it. Your healthcare provider will take swab your vagina or rectum and send the sample to the lab for tests. Fundal height  is a measurement of your uterus to check your baby's growth. This number is usually the same as the number of weeks that you have been pregnant. Your healthcare provider may also check your baby's position. Your baby's heart rate  will be checked. Follow up with your obstetrician as directed:  Write down your questions so you remember to ask them during your visits. © Copyright [employer] 2022 Information is for End User's use only and may not be sold, redistributed or otherwise used for commercial purposes. The above information is an  only. It is not intended as medical advice for individual conditions or treatments. Talk to your doctor, nurse or pharmacist before following any medical regimen to see if it is safe and effective for you.

## 2023-09-06 PROBLEM — Z3A.39 39 WEEKS GESTATION OF PREGNANCY: Status: ACTIVE | Noted: 2023-02-28

## 2023-09-06 NOTE — ASSESSMENT & PLAN NOTE
Archana Moser is a 29 y.o. [id number]  39w3d who presents for routine PNV. 28 week labs reviewed: NML   Would like induction, process explained, consent signed, message to OB pool  TWG 14.2 kg (31 lb 3.2 oz)   Denies OB complaints. Good fetal movement. Denies contractions, cramping, leakage of fluid or vaginal bleeding. Tdap vaccine completed  Reviewed  FKCs.    Advised to continue  Perineal massage at 34-36 weeks   Pregnancy Essential guide and Baby and Me web site recommended

## 2023-09-06 NOTE — PATIENT INSTRUCTIONS
Pregnancy at 44 to 40 Weeks   AMBULATORY CARE:   Changes happening with your body: You are now getting close to your due date. Your due date is just an estimate of when your baby will be born. Your baby may be born before or after your due date. Your breathing may be easier if your baby has moved down into a head-down position. You may need to urinate more often because the baby may be pressing on your bladder. You may also feel more discomfort and tire easily. You may also be having trouble sleeping. Seek care immediately if:   You develop a severe headache that does not go away. You have new or increased vision changes, such as blurred or spotted vision. You have new or increased swelling in your face or hands. You have vaginal spotting or bleeding. Your water broke or you feel warm water gushing or trickling from your vagina. Call your obstetrician if:   You have more than 5 contractions in 1 hour. You notice any changes in your baby's movements. You have abdominal cramps, pressure, or tightening. You have a change in vaginal discharge. You have chills or a fever. You have vaginal itching, burning, or pain. You have yellow, green, white, or foul-smelling vaginal discharge. You have pain or burning when you urinate, less urine than usual, or pink or bloody urine. You have questions or concerns about your condition or care. How to care for yourself at this stage of your pregnancy:       Eat a variety of healthy foods. Healthy foods include fruits, vegetables, whole-grain breads, low-fat dairy foods, beans, lean meats, and fish. Drink liquids as directed. Ask how much liquid to drink each day and which liquids are best for you. Limit caffeine to less than 200 milligrams each day. Limit your intake of fish to 2 servings each week. Choose fish low in mercury such as canned light tuna, shrimp, salmon, cod, or tilapia.  Do not  eat fish high in mercury such as swordfish, tilefish, chele mackerel, and shark. Take prenatal vitamins as directed. Your need for certain vitamins and minerals, such as folic acid, increases during pregnancy. Prenatal vitamins provide some of the extra vitamins and minerals you need. Prenatal vitamins may also help to decrease the risk of certain birth defects. Rest as needed. Put your feet up if you have swelling in your ankles and feet. Talk to your healthcare provider about exercise. Moderate exercise can help you stay fit. Your healthcare provider will help you plan an exercise program that is safe for you during pregnancy. Do not smoke. Smoking increases your risk of a miscarriage and other health problems during your pregnancy. Smoking can cause your baby to be born early or weigh less at birth. Ask your healthcare provider for information if you need help quitting. Do not drink alcohol. Alcohol passes from your body to your baby through the placenta. It can affect your baby's brain development and cause fetal alcohol syndrome (FAS). FAS is a group of conditions that causes mental, behavior, and growth problems. Talk to your healthcare provider before you take any medicines. Many medicines may harm your baby if you take them when you are pregnant. Do not take any medicines, vitamins, herbs, or supplements without first talking to your healthcare provider. Never use illegal or street drugs (such as marijuana or cocaine) while you are pregnant. Safety tips during pregnancy:   Avoid hot tubs and saunas. Do not use a hot tub or sauna while you are pregnant, especially during your first trimester. Hot tubs and saunas may raise your baby's temperature and increase the risk of birth defects. Avoid toxoplasmosis. This is an infection caused by eating raw meat or being around infected cat feces. It can cause birth defects, miscarriages, and other problems. Wash your hands after you touch raw meat.  Make sure any meat is well-cooked before you eat it. Avoid raw eggs and unpasteurized milk. Use gloves or ask someone else to clean your cat's litter box while you are pregnant. Ask your healthcare provider about travel. The most comfortable time to travel is during the second trimester. Ask your provider if you can travel after 36 weeks. You may not be able to travel in an airplane after 36 weeks. He or she may also recommend that you avoid long road trips. Changes happening with your baby: Your baby is ready to be born. At birth, your baby may weigh about 6 to 9 pounds and be about 19 to 21 inches long. Your baby may be in a head-down position. Your baby will also rest lower in your abdomen. What you need to know about prenatal care: Your healthcare provider will check your blood pressure and weight. You may also need the following:  A urine test  may also be done to check for sugar and protein. These can be signs of gestational diabetes or infection. Protein in your urine may also be a sign of preeclampsia. Preeclampsia is a condition that can develop during week 20 or later of your pregnancy. It causes high blood pressure, and it can cause problems with your kidneys and other organs. A gestational diabetes screen  may be done. Your healthcare provider may order either a 1-step or 2-step oral glucose tolerance test (OGTT). 1-step OGTT:  Your blood sugar level will be tested after you have not eaten for 8 hours (fasting). You will then be given a glucose drink. Your level will be tested again 1 hour and 2 hours after you finish the drink. 2-step OGTT:  You do not have to fast for the first part of the test. You will have the glucose drink at any time of day. Your blood sugar level will be checked 1 hour later. If your blood sugar is higher than a certain level, another test will be ordered. You will fast and your blood sugar level will be tested. You will have the glucose drink.  Your blood will be tested again 1 hour, 2 hours, and 3 hours after you finish the glucose drink. Your baby's heart rate  will be checked. Follow up with your obstetrician as directed:  Write down your questions so you remember to ask them during your visits. © Copyright [facility] 2022 Information is for End User's use only and may not be sold, redistributed or otherwise used for commercial purposes. The above information is an  only. It is not intended as medical advice for individual conditions or treatments. Talk to your doctor, nurse or pharmacist before following any medical regimen to see if it is safe and effective for you. Kick Counts in Pregnancy   AMBULATORY CARE:   Kick counts  measure how much your baby is moving in your womb. A kick from your baby can be felt as a twist, turn, swish, roll, or jab. It is common to feel your baby kicking at 26 to 28 weeks of pregnancy. You may feel your baby kick as early as 20 weeks of pregnancy. You may want to start counting at 28 weeks. Contact your doctor immediately if:   You feel a change in the number of kicks or movements of your baby. You feel fewer than 10 kicks within 2 hours. You have questions or concerns about your baby's movements. Why measure kick counts:  Your baby's movement may provide information about your baby's health. He or she may move less, or not at all, if there are problems. Your baby may move less if he or she is not getting enough oxygen or nutrition from the placenta. Do not smoke while you are pregnant. Smoking decreases the amount of oxygen that gets to your baby. Talk to your healthcare provider if you need help to quit smoking. Tell your healthcare provider as soon as you feel a change in your baby's movements. When to measure kick counts:   Measure kick counts at the same time every day. Measure kick counts when your baby is awake and most active. Your baby may be most active in the evening.     How to measure kick counts:  Check that your baby is awake before you measure kick counts. You can wake up your baby by lightly pushing on your belly, walking, or drinking something cold. Your healthcare provider may tell you different ways to measure kick counts. You may be told to do the following:  Use a chart or clock to keep track of the time you start and finish counting. Sit in a chair or lie on your left side. Place your hands on the largest part of your belly. Count until you reach 10 kicks. Write down how much time it takes to count 10 kicks. It may take 30 minutes to 2 hours to count 10 kicks. It should not take more than 2 hours to count 10 kicks. Follow up with your doctor as directed:  Write down your questions so you remember to ask them during your visits. © Copyright [employer] 2022 Information is for End User's use only and may not be sold, redistributed or otherwise used for commercial purposes. The above information is an  only. It is not intended as medical advice for individual conditions or treatments. Talk to your doctor, nurse or pharmacist before following any medical regimen to see if it is safe and effective for you. Perineal Massage    Perineal massage is recommended starting after 34 weeks in order to reduce risks of perineal tearing during childbirth. You have been provided and instructional sheet in your yellow 28 week prenatal packet. Having Your Baby: The Labor Process   AMBULATORY CARE:   The labor process  is a series of 3 stages that your body goes through to deliver your baby. It is not known for sure what causes labor to begin. Hormones made by you and your baby and changes in your uterus may help labor to start. Labor usually starts 2 weeks before or after your due date. Most women do not have their baby exactly on their due date. Call your obstetrician if:   You have vaginal spotting or bleeding.     Your water broke or you feel warm water gushing or trickling from your vagina. You have more than 5 contractions in 1 hour. You have bloody mucus or show. You notice any changes in your baby's movements. You have abdominal cramps, pressure, or tightening. You have a change in vaginal discharge. You have questions or concerns about your condition or care. First stage of labor: The first stage of labor includes latent (early) labor and active labor. This stage may last up to 12 hours if this is your first pregnancy. It may last up to 10 hours if you delivered a baby before. Your uterus will contract to prepare your cervix for delivery and to push your baby out of the birth canal. Your cervix will dilate (widen) and efface (soften and become thinner). Your contractions may last from 30 to 60 seconds. The contractions usually start in the back and move to the front. You may also have a pink, clear, or slightly bloody discharge called bloody show. Bloody show is caused by the movement of a mucus plug from your cervix. During pregnancy the mucus plug blocks your cervix to prevent it from opening  What to do during early labor:  Early labor may last for several hours. You will most likely be at home during early labor. Rest as much as possible while you are at home. Have someone rub your back. It may be helpful to place ice packs on your lower back. Go for a short walk if you are able. Drink water and suck on ice chips. Ask your healthcare provider if it is okay to eat during early labor. How to know when you are in active labor: This stage may last up to 12 hours if this is your first pregnancy. It may last up to 10 hours if you delivered a baby before. Your contractions will get stronger, last longer, and happen more frequently. They will also become more intense and painful. Time your contractions from the beginning of one to the beginning of the next. Write this information down for 1 hour.  Your healthcare provider will tell you when to go to the hospital or birthing center. This will be based on how many minutes apart your contractions are. Second stage of labor:  The second stage is the time between full cervix dilation and the birth of your baby. Your cervix will be completely dilated to 10 centimeters and your baby will be ready to be born. The second stage usually lasts 20 minutes to 2 hours. It may last up to 3 hours if this is your first baby. You may be given antibiotics to fight a bacterial infection you have or prevent an infection during delivery. Healthcare providers will help you find a position for giving birth that is comfortable for you. You can lie on your back, have your feet up in stirrups, or squat. You may feel pressure on your rectum and the urge to push. This pressure is caused by the movement of your baby's head down the birth canal. Your healthcare provider will have you push when you feel the urge. He or she will guide your baby out of the birth canal. Forceps or suction may be used to help deliver your baby. You may also need an episiotomy (incision) to make the vaginal opening larger. This will make more room for your baby. Your perineum will be protected during delivery. This may be with a warm compress or massage of the area. At least 1 minute after your baby is born, your healthcare provider will put clamps on the umbilical cord. The cord will then be cut. Your baby may be placed on your chest right away. He or she may also start breastfeeding. Third stage of labor:  The placenta (afterbirth) is delivered during this stage. After you give birth, your uterus will continue to contract to help push out the placenta. These contractions will begin 5 to 30 minutes after you give birth. Your healthcare provider will tell you when to push. You may have chills or shakiness during this stage. You may be given medicine to help prevent heavy bleeding that can happen during this stage.   How to manage labor pain:  Pain can be managed naturally or with medicines. You can naturally manage pain by using relaxation methods and controlled breathing. There are different types of medicines that can be used to relieve pain while you are in labor. These medicines may be given through an IV or an epidural (thin catheter in your lower back). Talk with your healthcare about your options for pain medicines if you choose to use them. Tell your provider if you prefer not to have any pain control medicines during labor. © Copyright [employer] 2022 Information is for End User's use only and may not be sold, redistributed or otherwise used for commercial purposes. The above information is an  only. It is not intended as medical advice for individual conditions or treatments. Talk to your doctor, nurse or pharmacist before following any medical regimen to see if it is safe and effective for you.

## 2023-09-08 ENCOUNTER — ROUTINE PRENATAL (OUTPATIENT)
Dept: OBGYN CLINIC | Facility: CLINIC | Age: 33
End: 2023-09-08
Payer: COMMERCIAL

## 2023-09-08 ENCOUNTER — TELEPHONE (OUTPATIENT)
Dept: OBGYN CLINIC | Facility: CLINIC | Age: 33
End: 2023-09-08

## 2023-09-08 VITALS — SYSTOLIC BLOOD PRESSURE: 116 MMHG | BODY MASS INDEX: 38.28 KG/M2 | WEIGHT: 259.2 LBS | DIASTOLIC BLOOD PRESSURE: 82 MMHG

## 2023-09-08 DIAGNOSIS — Z34.83 PRENATAL CARE, SUBSEQUENT PREGNANCY, THIRD TRIMESTER: Primary | ICD-10-CM

## 2023-09-08 LAB
SL AMB  POCT GLUCOSE, UA: NORMAL
SL AMB POCT URINE PROTEIN: NORMAL

## 2023-09-08 PROCEDURE — PNV: Performed by: OBSTETRICS & GYNECOLOGY

## 2023-09-08 PROCEDURE — 81002 URINALYSIS NONAUTO W/O SCOPE: CPT | Performed by: OBSTETRICS & GYNECOLOGY

## 2023-09-08 NOTE — TELEPHONE ENCOUNTER
Called [facility], per Elizabeth Addison, nothing available for eIOL next week. Will call Monday or Tuesday for available dates.

## 2023-09-08 NOTE — TELEPHONE ENCOUNTER
----- Message from Kia Johnston, [address] sent at 9/8/2023  1:37 PM EDT -----  Procedure to be scheduled: IOL   NORBERT: 9/12/23  Cervical dialtion:  0.5/ 50/ -3  Indication for delivery: elective   Requested date (s) of delivery:  36 and onwards   If requested date is unavailable, is there a date by which the pt must be delivered?    Physician preference: none       If IOL, anticipated method: cytotec/leyva

## 2023-09-08 NOTE — PROGRESS NOTES
Problem   39 Weeks Gestation of Pregnancy    Blood Type: A. +       Pap 01/23/2019 Neg, Pap& GC/CT neg x 3  PN1 Labs: normal  H&H: 12.5/37.7  28 Week Labs: nml    Level 1:3/6/2023  Level 2:4/25/23  Tdap: completed    GBS swab: GBS bacteruria    Blue folder: Given   Yellow folder: given    Breast pump order: order submitted  L&D forms: will bring    Delivery consent: signed  IOL: declined               39 weeks gestation of pregnancy  Luis Alberto West is a 29 y.o. [id number]  39w3d who presents for routine PNV. 28 week labs reviewed: NML   Would like induction, process explained, consent signed, message to OB pool  TWG 14.2 kg (31 lb 3.2 oz)   Denies OB complaints. Good fetal movement. Denies contractions, cramping, leakage of fluid or vaginal bleeding. Tdap vaccine completed  Reviewed  FKCs.    Advised to continue  Perineal massage at 34-36 weeks   Pregnancy Essential guide and Baby and Me web site recommended

## 2023-09-08 NOTE — PROGRESS NOTES
Patient is here for prenatal ob visit today. GA: 39w3d  NORBERT: 9/12/23  [id number]  Urine: Protein neg / Glucose neg  Denies loss of fluid, vaginal bleeding or contractions. Good fetal movement. Patient is having a girl  Labs utd  Yellow folder given  Delivery consent signed  Birth plan returned  Tdap given  GBS normal      Believes she lost mucus plug on Monday. Also reports [place] cary and hip pain. Wants cervical check today and discuss eIOL.

## 2023-09-12 ENCOUNTER — HOSPITAL ENCOUNTER (OUTPATIENT)
Dept: LABOR AND DELIVERY | Facility: HOSPITAL | Age: 33
Discharge: HOME | End: 2023-09-12
Payer: COMMERCIAL

## 2023-09-12 ENCOUNTER — HOSPITAL ENCOUNTER (INPATIENT)
Facility: HOSPITAL | Age: 33
LOS: 2 days | Discharge: HOME | End: 2023-09-14
Attending: OBSTETRICS & GYNECOLOGY | Admitting: OBSTETRICS & GYNECOLOGY
Payer: COMMERCIAL

## 2023-09-12 ENCOUNTER — ROUTINE PRENATAL (OUTPATIENT)
Dept: OBGYN CLINIC | Facility: CLINIC | Age: 33
End: 2023-09-12
Payer: COMMERCIAL

## 2023-09-12 VITALS
HEIGHT: 69 IN | WEIGHT: 258.6 LBS | BODY MASS INDEX: 38.3 KG/M2 | DIASTOLIC BLOOD PRESSURE: 92 MMHG | SYSTOLIC BLOOD PRESSURE: 124 MMHG

## 2023-09-12 DIAGNOSIS — Z3A.40 40 WEEKS GESTATION OF PREGNANCY: ICD-10-CM

## 2023-09-12 DIAGNOSIS — Z34.83 PRENATAL CARE, SUBSEQUENT PREGNANCY, THIRD TRIMESTER: Primary | ICD-10-CM

## 2023-09-12 DIAGNOSIS — Z3A.40 40 WEEKS GESTATION OF PREGNANCY: Primary | ICD-10-CM

## 2023-09-12 PROBLEM — Z34.90 ENCOUNTER FOR INDUCTION OF LABOR: Status: ACTIVE | Noted: 2020-06-08

## 2023-09-12 LAB
ABO GROUP BLD: NORMAL
BLD GP AB SCN SERPL QL: NEGATIVE
ERYTHROCYTE [DISTWIDTH] IN BLOOD BY AUTOMATED COUNT: 13.6 % (ref 11.6–15.1)
HCT VFR BLD AUTO: 35.1 % (ref 34.8–46.1)
HGB BLD-MCNC: 11.5 G/DL (ref 11.5–15.4)
MCH RBC QN AUTO: 27.3 PG (ref 26.8–34.3)
MCHC RBC AUTO-ENTMCNC: 32.8 G/DL (ref 31.4–37.4)
MCV RBC AUTO: 83 FL (ref 82–98)
PLATELET # BLD AUTO: 266 THOUSANDS/UL (ref 149–390)
PMV BLD AUTO: 11.3 FL (ref 8.9–12.7)
RBC # BLD AUTO: 4.22 MILLION/UL (ref 3.81–5.12)
RH BLD: POSITIVE
SL AMB  POCT GLUCOSE, UA: NORMAL
SL AMB POCT URINE PROTEIN: NORMAL
SPECIMEN EXPIRATION DATE: NORMAL
WBC # BLD AUTO: 10.56 THOUSAND/UL (ref 4.31–10.16)

## 2023-09-12 PROCEDURE — 81002 URINALYSIS NONAUTO W/O SCOPE: CPT

## 2023-09-12 PROCEDURE — 86901 BLOOD TYPING SEROLOGIC RH(D): CPT

## 2023-09-12 PROCEDURE — 86850 RBC ANTIBODY SCREEN: CPT

## 2023-09-12 PROCEDURE — 3E033VJ INTRODUCTION OF OTHER HORMONE INTO PERIPHERAL VEIN, PERCUTANEOUS APPROACH: ICD-10-PCS | Performed by: OBSTETRICS & GYNECOLOGY

## 2023-09-12 PROCEDURE — PNV

## 2023-09-12 PROCEDURE — 86900 BLOOD TYPING SEROLOGIC ABO: CPT

## 2023-09-12 PROCEDURE — NC001 PR NO CHARGE: Performed by: OBSTETRICS & GYNECOLOGY

## 2023-09-12 PROCEDURE — 86780 TREPONEMA PALLIDUM: CPT

## 2023-09-12 PROCEDURE — 4A1HXCZ MONITORING OF PRODUCTS OF CONCEPTION, CARDIAC RATE, EXTERNAL APPROACH: ICD-10-PCS | Performed by: OBSTETRICS & GYNECOLOGY

## 2023-09-12 PROCEDURE — 85027 COMPLETE CBC AUTOMATED: CPT

## 2023-09-12 RX ORDER — CALCIUM CARBONATE 500 MG/1
1000 TABLET, CHEWABLE ORAL 3 TIMES DAILY PRN
Status: DISCONTINUED | OUTPATIENT
Start: 2023-09-12 | End: 2023-09-13

## 2023-09-12 RX ORDER — SODIUM CHLORIDE, SODIUM LACTATE, POTASSIUM CHLORIDE, CALCIUM CHLORIDE 600; 310; 30; 20 MG/100ML; MG/100ML; MG/100ML; MG/100ML
125 INJECTION, SOLUTION INTRAVENOUS CONTINUOUS
Status: DISCONTINUED | OUTPATIENT
Start: 2023-09-12 | End: 2023-09-13

## 2023-09-12 RX ORDER — BUPIVACAINE HYDROCHLORIDE 2.5 MG/ML
30 INJECTION, SOLUTION EPIDURAL; INFILTRATION; INTRACAUDAL ONCE AS NEEDED
Status: DISCONTINUED | OUTPATIENT
Start: 2023-09-12 | End: 2023-09-13

## 2023-09-12 RX ORDER — ONDANSETRON 2 MG/ML
4 INJECTION INTRAMUSCULAR; INTRAVENOUS EVERY 6 HOURS PRN
Status: DISCONTINUED | OUTPATIENT
Start: 2023-09-12 | End: 2023-09-13

## 2023-09-12 RX ADMIN — CALCIUM CARBONATE (ANTACID) CHEW TAB 500 MG 1000 MG: 500 CHEW TAB at 22:07

## 2023-09-12 NOTE — PATIENT INSTRUCTIONS
Induction of Labor   WHAT YOU NEED TO KNOW:   What is induction of labor? Induction of labor is a procedure to induce (start) your labor before it begins on its own. Medicines and other methods are used to start contractions and help your cervix soften, thin (efface), and dilate (open). You may be given antibiotics to fight a bacterial infection you have or prevent an infection during delivery. Why might I need induction of labor? A health problem you have, such as high blood pressure or diabetes    A health problem your baby has, such as a slow heartbeat or poor growth inside the womb     Problems related to your pregnancy, such as infection of the amniotic fluid, your water breaks before labor begins, or you have too little amniotic fluid    What happens during induction of labor? Your healthcare provider may use one or more of the following to induce labor:  Cervical ripening  is a process that helps to soften and thin out your cervix. Medicines called prostaglandins may be used to ripen your cervix. These medicines can be inserted into your vagina or taken as a pill. Other methods can also be used to dilate the cervix. This includes a catheter with an inflatable balloon on the end that is inserted into your cervix. Saline injected through the catheter helps the balloon to expand. A substance that absorbs water may also be inserted into your cervix to help dilate it. Stripping the membranes  is a procedure that causes your body to release prostaglandins naturally. Prostaglandins soften the cervix and may help to cause contractions. Your healthcare provider will sweep a gloved finger over the membranes that connect the amniotic sac to the uterus wall. Rupturing the amniotic sac  is a procedure that is used to cause your water to break. Your healthcare provider will use a small tool to make a hole in your amniotic sac. This may help contractions to start.      Oxytocin  may be given through an IV to cause contractions to start and stay strong and regular. What are the risks of induction of labor? Medicines used to induce labor may cause too many contractions. This can lower your baby's heartbeat and decrease his or her oxygen supply. Induction of labor also increases the risk of umbilical cord prolapse. This condition causes the umbilical cord to slip back into the vagina before delivery. It can compress the cord and decrease your baby's oxygen supply. Medical induction may cause an infection in you or your baby. Medical induction may also increase your risk for a  section (), especially if it is the first time you give birth. Your uterus may rupture if you have had a  before. CARE AGREEMENT:   You have the right to help plan your care. Learn about your health condition and how it may be treated. Discuss treatment options with your healthcare providers to decide what care you want to receive. You always have the right to refuse treatment. The above information is an  only. It is not intended as medical advice for individual conditions or treatments. Talk to your doctor, nurse or pharmacist before following any medical regimen to see if it is safe and effective for you. © Copyright [facility]  Information is for End User's use only and may not be sold, redistributed or otherwise used for commercial purposes. Having Your Baby: The Labor Process   AMBULATORY CARE:   The labor process  is a series of 3 stages that your body goes through to deliver your baby. It is not known for sure what causes labor to begin. Hormones made by you and your baby and changes in your uterus may help labor to start. Labor usually starts 2 weeks before or after your due date. Most women do not have their baby exactly on their due date. Call your obstetrician if:   You have vaginal spotting or bleeding.     Your water broke or you feel warm water gushing or trickling from your vagina. You have more than 5 contractions in 1 hour. You have bloody mucus or show. You notice any changes in your baby's movements. You have abdominal cramps, pressure, or tightening. You have a change in vaginal discharge. You have questions or concerns about your condition or care. First stage of labor: The first stage of labor includes latent (early) labor and active labor. This stage may last up to 12 hours if this is your first pregnancy. It may last up to 10 hours if you delivered a baby before. Your uterus will contract to prepare your cervix for delivery and to push your baby out of the birth canal. Your cervix will dilate (widen) and efface (soften and become thinner). Your contractions may last from 30 to 60 seconds. The contractions usually start in the back and move to the front. You may also have a pink, clear, or slightly bloody discharge called bloody show. Bloody show is caused by the movement of a mucus plug from your cervix. During pregnancy the mucus plug blocks your cervix to prevent it from opening  What to do during early labor:  Early labor may last for several hours. You will most likely be at home during early labor. Rest as much as possible while you are at home. Have someone rub your back. It may be helpful to place ice packs on your lower back. Go for a short walk if you are able. Drink water and suck on ice chips. Ask your healthcare provider if it is okay to eat during early labor. How to know when you are in active labor: This stage may last up to 12 hours if this is your first pregnancy. It may last up to 10 hours if you delivered a baby before. Your contractions will get stronger, last longer, and happen more frequently. They will also become more intense and painful. Time your contractions from the beginning of one to the beginning of the next. Write this information down for 1 hour.  Your healthcare provider will tell you when to go to the hospital or birthing center. This will be based on how many minutes apart your contractions are. Second stage of labor:  The second stage is the time between full cervix dilation and the birth of your baby. Your cervix will be completely dilated to 10 centimeters and your baby will be ready to be born. The second stage usually lasts 20 minutes to 2 hours. It may last up to 3 hours if this is your first baby. You may be given antibiotics to fight a bacterial infection you have or prevent an infection during delivery. Healthcare providers will help you find a position for giving birth that is comfortable for you. You can lie on your back, have your feet up in stirrups, or squat. You may feel pressure on your rectum and the urge to push. This pressure is caused by the movement of your baby's head down the birth canal. Your healthcare provider will have you push when you feel the urge. He or she will guide your baby out of the birth canal. Forceps or suction may be used to help deliver your baby. You may also need an episiotomy (incision) to make the vaginal opening larger. This will make more room for your baby. Your perineum will be protected during delivery. This may be with a warm compress or massage of the area. At least 1 minute after your baby is born, your healthcare provider will put clamps on the umbilical cord. The cord will then be cut. Your baby may be placed on your chest right away. He or she may also start breastfeeding. Third stage of labor:  The placenta (afterbirth) is delivered during this stage. After you give birth, your uterus will continue to contract to help push out the placenta. These contractions will begin 5 to 30 minutes after you give birth. Your healthcare provider will tell you when to push. You may have chills or shakiness during this stage. You may be given medicine to help prevent heavy bleeding that can happen during this stage.   How to manage labor pain:  Pain can be managed naturally or with medicines. You can naturally manage pain by using relaxation methods and controlled breathing. There are different types of medicines that can be used to relieve pain while you are in labor. These medicines may be given through an IV or an epidural (thin catheter in your lower back). Talk with your healthcare about your options for pain medicines if you choose to use them. Tell your provider if you prefer not to have any pain control medicines during labor. © Copyright [facility] 2022 Information is for End User's use only and may not be sold, redistributed or otherwise used for commercial purposes. The above information is an  only. It is not intended as medical advice for individual conditions or treatments. Talk to your doctor, nurse or pharmacist before following any medical regimen to see if it is safe and effective for you.

## 2023-09-12 NOTE — PROGRESS NOTES
Prenatal Visit 40w0d    Patient denies any lof, vb or ctx. Patient has +fm. Patient urine is neg/neg  Induction is tonight  Having a Girl  Tdap is utd  Delivery consent signed  GBS -  IOL signed  Patient has no concerns today.

## 2023-09-12 NOTE — PROGRESS NOTES
Problem   40 Weeks Gestation of Pregnancy    Blood Type: A. +       Pap 01/23/2019 Neg, Pap& GC/CT neg x 3  PN1 Labs: normal  H&H: 12.5/37.7  28 Week Labs: nml    Level 1:3/6/2023  Level 2:4/25/23  Tdap: completed    GBS swab: GBS bacteruria    Blue folder: Given   Yellow folder: given    Breast pump order: order submitted  L&D forms: will bring    Delivery consent: signed  IOL: 9/12 8pm             40 weeks gestation of pregnancy  Emil Siemens is a 29 y.o. [id number]  40w0d who presents for routine PNV. Prepregnancy BMI 33.65 with a goal weight gain 5 kg (11 lb)-9 kg (19 lb). TWG 13.9 kg (30 lb 9.6 oz)  Denies OB complaints. Good fetal movement. Denies CTX/LOF/VB. IOL scheduled today with cervical ripening. Cervical exam is unchanged since last visit. Reviewed labor precautions, IOL process and FKCs. Return in 3-4 weeks for PP visit.

## 2023-09-12 NOTE — ASSESSMENT & PLAN NOTE
Johnson Mahmood is a 29 y.o.   40w0d who presents for routine PNV. Prepregnancy BMI 33.65 with a goal weight gain 5 kg (11 lb)-9 kg (19 lb). TWG 13.9 kg (30 lb 9.6 oz)  Denies OB complaints. Good fetal movement. Denies CTX/LOF/VB. IOL scheduled today with cervical ripening. Cervical exam is unchanged since last visit. Reviewed labor precautions, IOL process and FKCs. Return in 3-4 weeks for PP visit.

## 2023-09-13 ENCOUNTER — ANESTHESIA (INPATIENT)
Dept: ANESTHESIOLOGY | Facility: HOSPITAL | Age: 33
End: 2023-09-13
Payer: COMMERCIAL

## 2023-09-13 ENCOUNTER — ANESTHESIA EVENT (INPATIENT)
Dept: ANESTHESIOLOGY | Facility: HOSPITAL | Age: 33
End: 2023-09-13
Payer: COMMERCIAL

## 2023-09-13 LAB
BASE EXCESS BLDCOA CALC-SCNC: -2.3 MMOL/L (ref 3–11)
BASE EXCESS BLDCOV CALC-SCNC: -2.2 MMOL/L (ref 1–9)
HCO3 BLDCOA-SCNC: 27.1 MMOL/L (ref 17.3–27.3)
HCO3 BLDCOV-SCNC: 24.5 MMOL/L (ref 12.2–28.6)
HOLD SPECIMEN: NORMAL
O2 CT VFR BLDCOA CALC: 3.3 ML/DL
OXYHGB MFR BLDCOA: 13.4 %
OXYHGB MFR BLDCOV: 39.5 %
PCO2 BLDCOA: 65.2 MM[HG] (ref 30–60)
PCO2 BLDCOV: 48.6 MM HG (ref 27–43)
PH BLDCOA: 7.24 [PH] (ref 7.23–7.43)
PH BLDCOV: 7.32 [PH] (ref 7.19–7.49)
PO2 BLDCOA: 11.4 MM HG (ref 5–25)
PO2 BLDCOV: 18.7 MM HG (ref 15–45)
SAO2 % BLDCOV: 9.9 ML/DL
TREPONEMA PALLIDUM IGG+IGM AB [PRESENCE] IN SERUM OR PLASMA BY IMMUNOASSAY: NORMAL

## 2023-09-13 PROCEDURE — 59409 OBSTETRICAL CARE: CPT | Performed by: OBSTETRICS & GYNECOLOGY

## 2023-09-13 PROCEDURE — 0UQMXZZ REPAIR VULVA, EXTERNAL APPROACH: ICD-10-PCS | Performed by: OBSTETRICS & GYNECOLOGY

## 2023-09-13 PROCEDURE — 82805 BLOOD GASES W/O2 SATURATION: CPT | Performed by: OBSTETRICS & GYNECOLOGY

## 2023-09-13 PROCEDURE — 0KQM0ZZ REPAIR PERINEUM MUSCLE, OPEN APPROACH: ICD-10-PCS | Performed by: OBSTETRICS & GYNECOLOGY

## 2023-09-13 RX ORDER — LIDOCAINE HYDROCHLORIDE AND EPINEPHRINE 15; 5 MG/ML; UG/ML
INJECTION, SOLUTION EPIDURAL
Status: COMPLETED | OUTPATIENT
Start: 2023-09-13 | End: 2023-09-13

## 2023-09-13 RX ORDER — OXYTOCIN/RINGER'S LACTATE 30/500 ML
1-30 PLASTIC BAG, INJECTION (ML) INTRAVENOUS
Status: DISCONTINUED | OUTPATIENT
Start: 2023-09-13 | End: 2023-09-13

## 2023-09-13 RX ORDER — DIAPER,BRIEF,INFANT-TODD,DISP
1 EACH MISCELLANEOUS DAILY PRN
Status: DISCONTINUED | OUTPATIENT
Start: 2023-09-13 | End: 2023-09-14 | Stop reason: HOSPADM

## 2023-09-13 RX ORDER — DIPHENHYDRAMINE HYDROCHLORIDE 50 MG/ML
25 INJECTION INTRAMUSCULAR; INTRAVENOUS EVERY 6 HOURS PRN
Status: DISCONTINUED | OUTPATIENT
Start: 2023-09-13 | End: 2023-09-14 | Stop reason: HOSPADM

## 2023-09-13 RX ORDER — SENNOSIDES 8.6 MG
1 TABLET ORAL DAILY
Status: DISCONTINUED | OUTPATIENT
Start: 2023-09-13 | End: 2023-09-14 | Stop reason: HOSPADM

## 2023-09-13 RX ORDER — DOCUSATE SODIUM 100 MG/1
100 CAPSULE, LIQUID FILLED ORAL 2 TIMES DAILY
Status: DISCONTINUED | OUTPATIENT
Start: 2023-09-13 | End: 2023-09-14 | Stop reason: HOSPADM

## 2023-09-13 RX ORDER — IBUPROFEN 600 MG/1
600 TABLET ORAL EVERY 6 HOURS PRN
Status: DISCONTINUED | OUTPATIENT
Start: 2023-09-13 | End: 2023-09-14 | Stop reason: HOSPADM

## 2023-09-13 RX ORDER — ONDANSETRON 2 MG/ML
4 INJECTION INTRAMUSCULAR; INTRAVENOUS EVERY 8 HOURS PRN
Status: DISCONTINUED | OUTPATIENT
Start: 2023-09-13 | End: 2023-09-14 | Stop reason: HOSPADM

## 2023-09-13 RX ORDER — ACETAMINOPHEN 325 MG/1
650 TABLET ORAL EVERY 4 HOURS PRN
Status: DISCONTINUED | OUTPATIENT
Start: 2023-09-13 | End: 2023-09-14 | Stop reason: HOSPADM

## 2023-09-13 RX ORDER — OXYTOCIN/RINGER'S LACTATE 30/500 ML
250 PLASTIC BAG, INJECTION (ML) INTRAVENOUS ONCE
Status: DISCONTINUED | OUTPATIENT
Start: 2023-09-13 | End: 2023-09-14 | Stop reason: HOSPADM

## 2023-09-13 RX ORDER — CALCIUM CARBONATE 500 MG/1
1000 TABLET, CHEWABLE ORAL 3 TIMES DAILY PRN
Status: DISCONTINUED | OUTPATIENT
Start: 2023-09-13 | End: 2023-09-14 | Stop reason: HOSPADM

## 2023-09-13 RX ADMIN — SODIUM CHLORIDE 2.5 MILLION UNITS: 9 INJECTION, SOLUTION INTRAVENOUS at 09:05

## 2023-09-13 RX ADMIN — SODIUM CHLORIDE 2.5 MILLION UNITS: 9 INJECTION, SOLUTION INTRAVENOUS at 13:12

## 2023-09-13 RX ADMIN — SODIUM CHLORIDE, SODIUM LACTATE, POTASSIUM CHLORIDE, AND CALCIUM CHLORIDE 125 ML/HR: .6; .31; .03; .02 INJECTION, SOLUTION INTRAVENOUS at 09:05

## 2023-09-13 RX ADMIN — SODIUM CHLORIDE 2.5 MILLION UNITS: 9 INJECTION, SOLUTION INTRAVENOUS at 04:56

## 2023-09-13 RX ADMIN — LIDOCAINE HYDROCHLORIDE AND EPINEPHRINE 3 ML: 15; 5 INJECTION, SOLUTION EPIDURAL at 12:21

## 2023-09-13 RX ADMIN — ROPIVACAINE HYDROCHLORIDE: 2 INJECTION, SOLUTION EPIDURAL; INFILTRATION at 12:29

## 2023-09-13 RX ADMIN — CALCIUM CARBONATE (ANTACID) CHEW TAB 500 MG 1000 MG: 500 CHEW TAB at 12:33

## 2023-09-13 RX ADMIN — IBUPROFEN 600 MG: 600 TABLET, FILM COATED ORAL at 22:08

## 2023-09-13 RX ADMIN — SODIUM CHLORIDE, SODIUM LACTATE, POTASSIUM CHLORIDE, AND CALCIUM CHLORIDE 125 ML/HR: .6; .31; .03; .02 INJECTION, SOLUTION INTRAVENOUS at 13:09

## 2023-09-13 RX ADMIN — BENZOCAINE AND LEVOMENTHOL 1 APPLICATION: 200; 5 SPRAY TOPICAL at 16:02

## 2023-09-13 RX ADMIN — SODIUM CHLORIDE, SODIUM LACTATE, POTASSIUM CHLORIDE, AND CALCIUM CHLORIDE 125 ML/HR: .6; .31; .03; .02 INJECTION, SOLUTION INTRAVENOUS at 00:53

## 2023-09-13 RX ADMIN — ONDANSETRON 4 MG: 2 INJECTION INTRAMUSCULAR; INTRAVENOUS at 01:12

## 2023-09-13 RX ADMIN — IBUPROFEN 600 MG: 600 TABLET, FILM COATED ORAL at 16:02

## 2023-09-13 RX ADMIN — Medication 2 MILLI-UNITS/MIN: at 01:27

## 2023-09-13 RX ADMIN — ONDANSETRON 4 MG: 2 INJECTION INTRAMUSCULAR; INTRAVENOUS at 14:18

## 2023-09-13 RX ADMIN — PENICILLIN G POTASSIUM 5 MILLION UNITS: 20000000 INJECTION, POWDER, FOR SOLUTION INTRAVENOUS at 00:53

## 2023-09-13 NOTE — DISCHARGE SUMMARY
Discharge Summary - Bandar Price 29 y.o. female MRN: [id number]    Unit/Bed#: -01 Encounter: [id number]    Admission Date: 2023     Discharge Date: 23    Admitting Attending: Dr. Michael Police   Delivering Attending: Dr. Ascencion Ji   Discharge Attending: Dr. Lima Pike    Diagnosis:   1. Pregnancy at 40 weeks of gestation   2. Encounter for induction of labor   3. Mild asthma   4. Raynaud's phenomenon   5. GBS bacteruria   6. Hx of gestational hypertension   7. Spontaneous vaginal delivery     Procedures: Spontaneous Vaginal Delivery, repair of supra-urethral and right vaginal side wall laceration     Complications: none apparent    Hospital Course: Patient was admitted for elective induction of labor. On arrival she was found to be 1-2/50/-2. She was started on penicillin for GBS bacteruria. A leyva balloon was placed and she was started on a low dose pitocin titration. The leyva balloon was expelled and she received an epidural for analgesia. She spontaneously ruptured for clear fluid. She progressed to complete and began pushing. She delivered via  with a supraurethral and right vaginal sidewall lacerations. She had an uncomplicated postpartum course. Condition at discharge: good     On day of discharge, patient was tolerating PO, passing flatus, urinating, and ambulating. Her pain was well controlled with oral analgesics. She was discharged home on postpartum day #1 with standard post partum instructions to follow up with her physician in 3-6 weeks for a postpartum appointment. Discharge instructions/Information to patient and family:   - Do not place anything (no partner, tampons or douche) in your vagina for 6 weeks.   -You may walk for exercise for the first 6 weeks then gradually return to your usual activities.   -Please do not drive for 1 week if you have no stitches and for 2 weeks if you have stitches or underwent a  delivery.    -You may take baths or shower per your preference.   -Please look at your bust (breasts) in the mirror daily and call for redness or tenderness or increased warmth.   -Please call us for temperature > 100.4*F or 38* C, worsening pain or a foul discharge. Discharge Medications:   Prenatal vitamin daily for 6 months or the duration of nursing whichever is longer. Motrin 600 mg orally every 6 hours as needed for pain  Tylenol (over the counter) per bottle directions as needed for pain: do NOT use with percocet  Hydrocortisone cream 1% (over the counter) applied 1-2x daily to hemorrhoids as needed    Provisions for Follow-Up Care: Follow up with your doctor in 3-6 weeks for postpartum care.      Planned Readmission: No

## 2023-09-13 NOTE — OB LABOR/OXYTOCIN SAFETY PROGRESS
Oxytocin Safety Progress Check Note - Luis Alberto West 29 y.o. female MRN: [id number]    Unit/Bed#: -01 Encounter: [id number]    Dose (grace-units/min) Oxytocin: 14 grace-units/min  Contraction Frequency (minutes): irregular  Contraction Quality: Not applicable  Tachysystole: No   Cervical Dilation: 5        Cervical Effacement: 60  Fetal Station: -1  Baseline Rate: 125 bpm  Fetal Heart Rate: 140 BPM  FHR Category:                Vital Signs:   Vitals:    09/13/23 0605   BP: 121/70   Pulse: 71   Resp:    Temp:    SpO2:        Notes/comments:     SVE as above. Will continue pitocin titration.  Cat 1 tracing      Gerri Moulton DO 9/13/2023 6:13 AM

## 2023-09-13 NOTE — ANESTHESIA PREPROCEDURE EVALUATION
Procedure:  LABOR ANALGESIA    Relevant Problems   ANESTHESIA   (-) History of anesthesia complications      GYN   (+) 40 weeks gestation of pregnancy      NEURO/PSYCH   (+) Current moderate episode of major depressive disorder without prior episode (HCC)      PULMONARY   (+) Mild intermittent asthma without complication       Latest Reference Range & Units 09/12/23 22:15   WBC 4.31 - 10.16 Thousand/uL 10.56 (H)   Red Blood Cell Count 3.81 - 5.12 Million/uL 4.22   Hemoglobin 11.5 - 15.4 g/dL 11.5   HCT 34.8 - 46.1 % 35.1   MCV 82 - 98 fL 83   MCH 26.8 - 34.3 pg 27.3   MCHC 31.4 - 37.4 g/dL 32.8   RDW 11.6 - 15.1 % 13.6   Platelet Count 263 - 390 Thousands/uL 266   MPV 8.9 - 12.7 fL 11.3   (H): Data is abnormally high  Physical Exam    Airway    Mallampati score: I  TM Distance: >3 FB  Neck ROM: full     Dental   No notable dental hx     Cardiovascular  Rhythm: regular, No JVD,     Pulmonary  Breath sounds normal,     Other Findings        Anesthesia Plan  ASA Score- 2     Anesthesia Type- epidural with ASA Monitors. Additional Monitors:   Airway Plan:           Plan Factors-    Chart reviewed. Existing labs reviewed. Patient summary reviewed. Induction-     Postoperative Plan-     Informed Consent- Anesthetic plan and risks discussed with patient. I personally reviewed this patient with the CRNA. Discussed and agreed on the Anesthesia Plan with the CRNA. Any Lamas

## 2023-09-13 NOTE — OB LABOR/OXYTOCIN SAFETY PROGRESS
Oxytocin Safety Progress Check Note - Archana Moser 29 y.o. female MRN: [id number]    Unit/Bed#: -01 Encounter: [id number]    Dose (grace-units/min) Oxytocin: 28 grace-units/min  Contraction Frequency (minutes): 3-4.5  Contraction Quality: Moderate  Tachysystole: No   Cervical Dilation: 10  Dilation Complete Date: 09/13/23  Dilation Complete Time: 1411  Cervical Effacement: 100  Fetal Station: 1  Baseline Rate: 120 bpm  Fetal Heart Rate: 105 BPM  FHR Category: 1               Vital Signs:   Vitals:    09/13/23 1400   BP: 117/58   Pulse:    Resp:    Temp:    SpO2:        Notes/comments:   Early decelerations noted on FHT. Sindy Alvarado reports feeling nauseated. SVE at this time 10/100/+1. Will plan to begin pushing. Dr. David Rivera aware.      Elliot Ro MD 9/13/2023 2:14 PM

## 2023-09-13 NOTE — ANESTHESIA PROCEDURE NOTES
Epidural Block    Reason for block: procedure for pain and at surgeon's request  Staffing  Performed by: Akshat Chaparro MD  Authorized by: Akshat Chaparro MD    Preanesthetic Checklist  Completed: patient identified, IV checked, site marked, risks and benefits discussed, surgical consent, monitors and equipment checked, pre-op evaluation and timeout performed  Epidural  Patient position: sitting  Prep: ChloraPrep  Patient monitoring: cardiac monitor and frequent blood pressure checks  Approach: midline  Location: lumbar  Injection technique: YASH air  Needle  Needle type: Tuohy   Needle gauge: 18 G  Catheter type: side hole  Catheter size: 20 G  Catheter at skin depth: 13 cm  Test dose: negativelidocaine-epinephrine (XYLOCAINE-MPF/EPINEPHRINE) 1.5 %-1:200,000 injection 3 mL - Epidural   3 mL - 9/13/2023 12:21:00 PM  Assessment  Number of attempts: 1negative aspiration for CSF, negative aspiration for heme and no paresthesia on injection  patient tolerated the procedure well with no immediate complications

## 2023-09-13 NOTE — OB LABOR/OXYTOCIN SAFETY PROGRESS
Oxytocin Safety Progress Check Note - Minna Jeter 29 y.o. female MRN: [id number]    Unit/Bed#: -01 Encounter: [id number]    Dose (grace-units/min) Oxytocin: 4 grace-units/min  Contraction Frequency (minutes): 5-10  Contraction Quality: Not applicable  Tachysystole: No   Cervical Dilation: 5        Cervical Effacement: 60  Fetal Station: -1  Baseline Rate: 130 bpm  Fetal Heart Rate: 129 BPM  FHR Category:                Vital Signs:   Vitals:    09/12/23 2050   BP: 136/90   Pulse: 91   Resp: 18   Temp: 98.3 °F (36.8 °C)   SpO2: 98%       Notes/comments:     SVE as above. Continue pitocin titration.  Epidural at patient request    Nikolas Madera DO 9/13/2023 3:35 AM [facility]

## 2023-09-13 NOTE — L&D DELIVERY NOTE
Vaginal Delivery Summary - OB/GYN   Bandar Price 29 y.o. female MRN: [id number]  Unit/Bed#: -01 Encounter: [id number]          Predelivery Diagnosis:  1. Pregnancy at 40 weeks of gestation   2. Encounter for induction of labor   3. Mild asthma   4. Raynaud's phenomenon   5. GBS bacteruria   6. Hx of gestational hypertension     Postdelivery Diagnosis:  1. Same as above  2. Delivery of term     Procedure: Spontaneous Vaginal Delivery, repair of supraurethral and right vaginal laceration    Attending: Dr. Ascencion Ji     Assistant: Scarlett Fernandez    Anesthesia: Epidural    QBL: 202cc  Admission H.5  Admission platelets: 747    Complications: none apparent    Specimens: cord blood, arterial and venous cord blood gasses, placenta to storage    Findings:   1. Viable female at 1, with APGARS of 7 and 9 at 1 and 5 minutes respectively,  2. Spontaneous delivery of intact placenta   3. A supraurethral laceration repaired with 4-0 vicryl   4. Right vaginal laceration repaired with 3-0 vicryl rapide   4. Blood gases:    Umbilical Cord Venous Blood Gas:  Results from last 7 days   Lab Units 23  1432   PH COV  7.321   PCO2 COV mm HG 48.6*   HCO3 COV mmol/L 24.5   BASE EXC COV mmol/L -2.2*   O2 CT CD VB mL/dL 9.9   O2 HGB, VENOUS CORD % 17.8     Umbilical Cord Arterial Blood Gas:  Results from last 7 days   Lab Units 23  1432   PH COA  7.236   PCO2 COA  65.2*   PO2 COA mm HG 11.4   HCO3 COA mmol/L 27.1   BASE EXC COA mmol/L -2.3*   O2 CONTENT CORD ART ml/dl 3.3   O2 HGB, ARTERIAL CORD % 13.4       Disposition:  Patient tolerated the procedure well and was recovering in labor and delivery room     Brief history and labor course:  Ms. Bandar Price is a 29 y.o.  at 40wk0d. She presented to labor and deliverywas admitted for elective induction of labor. On arrival she was found to be 1-2/50/-2. She was started on penicillin for GBS bacteruria.  A leyva balloon was placed and she was started on a low dose pitocin titration. The leyva balloon was expelled and she received an epidural for analgesia. She spontaneously ruptured for clear fluid. She progressed to complete and began pushing. She delivered via  with a supraurethral and right vaginal sidewall lacerations. She had an uncomplicated postpartum course.     .    Description of procedure  After pushing for 6 minutes, at 1428 patient delivered a viable female , wt pending, apgars of 7 (1 min) and 9 (5 min). The The fetal vertex delivered spontaneously and restituted KAREN. The fetus was noted to be in compound presentation. The right arm was grasped and gentle extended out of the vaginal. Baby was checked for nuchal. No nuchal cord was noted. The anterior shoulder (fetal left) delivered atraumatically with maternal expulsive forces and the assistance of downward traction. The posterior shoulder delivered with maternal expulsive forces and the assistance of upward traction. The remainder of the fetus delivered spontaneously. Upon delivery, the infant was placed on the mothers abdomen and the cord was clamped and cut. Delayed cord clamping was performed. The infant was noted to cry spontaneously and was moving all extremities appropriately. There was no evidence for injury. Awaiting nurse resuscitators evaluated the . Arterial and venous cord blood gases and cord blood was collected for analysis. These were promptly sent to the lab. In the immediate post-partum, 30 units of IV pitocin was administered, and the uterus was noted to contract down well with massage and pitocin. The placenta delivered spontaneously at 1432 and was noted to have a centrally inserted 3 vessel cord. The vagina, cervix, perineum, and rectum were inspected and there were noted to be a supraurethral and vaginal wall lacerations. Attention was first turned to the supra-urethral laceration which was repaired in a running, non-locked fashion using 4-0 vicryl. Attention was then turned to the vaginal wall laceration. The apex of the laceration was identified and the laceration was re-approximated using 3-0 vicryl rapide. Excellent hemostasis was achieved. At the conclusion of the procedure, all needle, sponge, and instrument counts were noted to be correct. Patient tolerated the procedure well and was allowed to recover in labor and delivery room with family and  before being transferred to the post-partum floor. Dr. Inder Poe was present and participated in all key portions of the case.         Johanne Bobby MD  2023  2:53 PM

## 2023-09-13 NOTE — PLAN OF CARE
Problem: DISCHARGE PLANNING - CARE MANAGEMENT  Goal: Discharge to post-acute care or home with appropriate resources  Description: INTERVENTIONS:  - Conduct assessment to determine patient/family and health care team treatment goals, and need for post-acute services based on payer coverage, community resources, and patient preferences, and barriers to discharge  - Address psychosocial, clinical, and financial barriers to discharge as identified in assessment in conjunction with the patient/family and health care team  - Arrange appropriate level of post-acute services according to patient’s   needs and preference and payer coverage in collaboration with the physician and health care team  - Communicate with and update the patient/family, physician, and health care team regarding progress on the discharge plan  - Arrange appropriate transportation to post-acute venues  Outcome: Progressing     Problem: PAIN - ADULT  Goal: Verbalizes/displays adequate comfort level or baseline comfort level  Description: Interventions:  - Encourage patient to monitor pain and request assistance  - Assess pain using appropriate pain scale  - Administer analgesics based on type and severity of pain and evaluate response  - Implement non-pharmacological measures as appropriate and evaluate response  - Consider cultural and social influences on pain and pain management  - Notify physician/advanced practitioner if interventions unsuccessful or patient reports new pain  Outcome: Progressing     Problem: INFECTION - ADULT  Goal: Absence or prevention of progression during hospitalization  Description: INTERVENTIONS:  - Assess and monitor for signs and symptoms of infection  - Monitor lab/diagnostic results  - Monitor all insertion sites, i.e. indwelling lines, tubes, and drains  - Monitor endotracheal if appropriate and nasal secretions for changes in amount and color  - [place] appropriate cooling/warming therapies per order  - Administer medications as ordered  - Instruct and encourage patient and family to use good hand hygiene technique  - Identify and instruct in appropriate isolation precautions for identified infection/condition  Outcome: Progressing  Goal: Absence of fever/infection during neutropenic period  Description: INTERVENTIONS:  - Monitor WBC    Outcome: Progressing     Problem: SAFETY ADULT  Goal: Patient will remain free of falls  Description: INTERVENTIONS:  - Educate patient/family on patient safety including physical limitations  - Instruct patient to call for assistance with activity   - Consult OT/PT to assist with strengthening/mobility   - Keep Call bell within reach  - Keep bed low and locked with side rails adjusted as appropriate  - Keep care items and personal belongings within reach  - Initiate and maintain comfort rounds  - Make Fall Risk Sign visible to staff  - Offer Toileting every  Hours, in advance of need  - Initiate/Maintain alarm  - Obtain necessary fall risk management equipment:   - Apply yellow socks and bracelet for high fall risk patients  - Consider moving patient to room near nurses station  Outcome: Progressing  Goal: Maintain or return to baseline ADL function  Description: INTERVENTIONS:  -  Assess patient's ability to carry out ADLs; assess patient's baseline for ADL function and identify physical deficits which impact ability to perform ADLs (bathing, care of mouth/teeth, toileting, grooming, dressing, etc.)  - Assess/evaluate cause of self-care deficits   - Assess range of motion  - Assess patient's mobility; develop plan if impaired  - Assess patient's need for assistive devices and provide as appropriate  - Encourage maximum independence but intervene and supervise when necessary  - Involve family in performance of ADLs  - Assess for home care needs following discharge   - Consider OT consult to assist with ADL evaluation and planning for discharge  - Provide patient education as appropriate  Outcome: Progressing  Goal: Maintains/Returns to pre admission functional level  Description: INTERVENTIONS:  - Perform BMAT or MOVE assessment daily.   - Set and communicate daily mobility goal to care team and patient/family/caregiver. - Collaborate with rehabilitation services on mobility goals if consulted  - Perform Range of Motion times a day. - Reposition patient every  hours. - Dangle patient  times a day  - Stand patient  times a day  - Ambulate patient  times a day  - Out of bed to chair times a day   - Out of bed for meals  times a day  - Out of bed for toileting  - Record patient progress and toleration of activity level   Outcome: Progressing     Problem: Knowledge Deficit  Goal: Patient/family/caregiver demonstrates understanding of disease process, treatment plan, medications, and discharge instructions  Description: Complete learning assessment and assess knowledge base.   Interventions:  - Provide teaching at level of understanding  - Provide teaching via preferred learning methods  Outcome: Progressing     Problem: DISCHARGE PLANNING  Goal: Discharge to home or other facility with appropriate resources  Description: INTERVENTIONS:  - Identify barriers to discharge w/patient and caregiver  - Arrange for needed discharge resources and transportation as appropriate  - Identify discharge learning needs (meds, wound care, etc.)  - Arrange for interpretive services to assist at discharge as needed  - Refer to Case Management Department for coordinating discharge planning if the patient needs post-hospital services based on physician/advanced practitioner order or complex needs related to functional status, cognitive ability, or social support system  Outcome: Progressing     Problem: POSTPARTUM  Goal: Experiences normal postpartum course  Description: INTERVENTIONS:  - Monitor maternal vital signs  - Assess uterine involution and lochia  Outcome: Progressing  Goal: Appropriate maternal -  bonding  Description: INTERVENTIONS:  - Identify family support  - Assess for appropriate maternal/infant bonding   -Encourage maternal/infant bonding opportunities  - Referral to  or  as needed  Outcome: Progressing  Goal: Establishment of infant feeding pattern  Description: INTERVENTIONS:  - Assess breast/bottle feeding  - Refer to lactation as needed  Outcome: Progressing  Goal: Incision(s), wounds(s) or drain site(s) healing without S/S of infection  Description: INTERVENTIONS  - Assess and document dressing, incision, wound bed, drain sites and surrounding tissue  - Provide patient and family education  - Perform skin care/dressing changes every   Outcome: Progressing

## 2023-09-13 NOTE — OB LABOR/OXYTOCIN SAFETY PROGRESS
Oxytocin Safety Progress Check Note - Kuldip Cordoba 29 y.o. female MRN: [id number]    Unit/Bed#: -01 Encounter: [id number]    Dose (grace-units/min) Oxytocin: 28 grace-units/min  Contraction Frequency (minutes): difficulty tracing due to materal posiiton  Contraction Quality: Moderate  Tachysystole: No   Cervical Dilation: 7-8        Cervical Effacement: 80  Fetal Station: -1  Baseline Rate: 115 bpm  Fetal Heart Rate: 136 BPM  FHR Category: 2               Vital Signs:   Vitals:    09/13/23 1300   BP: 136/71   Pulse: 69   Resp:    Temp: 97.5 °F (36.4 °C)   SpO2: 100%       Notes/comments:   Occasional late decelerations noted on FHT. Maternal repositioning underway. SVE 7-8/80/-1.  Will continue maternal repositioning and decrease pitocin titration if decelerations persist.     Linda Álvarez MD 9/13/2023 1:51 PM

## 2023-09-13 NOTE — DISCHARGE INSTRUCTIONS
Self Care After Delivery   AMBULATORY CARE:   The postpartum period is the period of time from delivery to about 6 weeks. During this time you may experience many physical and emotional changes. It is important to understand what is normal and when you need to call your healthcare provider. It is also important to know how to care for yourself during this time. Call your local emergency number (911 in the [address]) for any of the following: You see or hear things that are not there, or have thoughts of harming yourself or your baby. You soak through 1 pad in 15 minutes, have blurry vision, clammy or pale skin, and feel faint. You faint or lose consciousness. You have trouble breathing. You cough up blood. Your  incision comes apart. Seek care immediately if:   Your heart is beating faster than usual.     You have a bad headache or changes in your vision. Your perineal tear, episiotomy site, or  incision is red, swollen, bleeding, or draining pus. You have severe abdominal pain. Call your doctor or obstetrician if:   Your leg is painful, red, and larger than usual.     You soak through 1 or more pads in an hour, or pass blood clots larger than a quarter from your vagina. You have a fever. You have new or worsening pain in your abdomen or vagina. You continue to have depression 1 to 2 weeks after you deliver. You have trouble sleeping. You have foul-smelling discharge from your vagina. You have pain or burning when you urinate. You do not have a bowel movement for 3 days or more. You have nausea or are vomiting. You have hard lumps or red streaks over your breasts. You have cracked nipples or bleed from your nipples. You have questions or concerns about your condition or care. Physical changes:  The following are normal changes after you give birth:  Pain in the area between your anus and vagina     Breast pain Constipation or hemorrhoids     Hot or cold flashes     Vaginal bleeding or discharge     Mild to moderate abdominal cramping     Difficulty controlling bowel movements or urine     Emotional changes: A drop in hormone levels after you deliver may cause changes in your emotions. You may feel irritable, sad, or anxious. You may cry easily or for no reason. You may also feel depressed. Depression that continues can be a sign of postpartum depression, a condition that can be treated. Treatment may include talk therapy, medicines, or both. Healthcare providers will ask how you are feeling and if you have any depression. These talks can happen during appointments for your medical care and for your baby's care, such as well child visits. Providers can help you find ways to care for yourself and your baby. Talk to your providers about the following:  When emotional changes or depression started, and if it is getting worse over time     Problems you are having with daily activities, sleep, or caring for your baby     If anything makes you feel worse, or makes you feel better     Feeling that you are not bonding with your baby the way you want     Any problems your baby has with sleeping or feeding     Your baby is fussy or cries a lot     Support you have from friends, family, or others     Breast care for breastfeeding mothers: You may have sore breasts for 3 to 6 days after you give birth. This happens as your milk begins to fill your breasts. You may also have sore breasts if you do not breastfeed frequently. Do the following to care for your breasts:  Apply a moist, warm, compress to your breast as directed. This may help soothe your breasts. Make sure the washcloth is not too hot before you apply it to your breast.     Nurse your baby or pump your milk frequently. This may prevent clogged milk ducts. Ask your healthcare provider how often to nurse or pump. Massage your breasts as directed.  This may help increase your milk flow. Gently rub your breasts in a circular motion before you breastfeed. You may need to gently squeeze your breast or nipple to help release milk. You can also use a breast pump to help release milk from your breast.     Wash your breasts with warm water only. Do not put soap on your nipples. Soap may cause your nipples to become dry. Apply lanolin cream to your nipples as directed. Lanolin cream may add moisture to your skin and prevent nipple dryness. Always wash off lanolin cream with warm water before you breastfeed. Place pads in your bra. Your nipples may leak milk when you are not breastfeeding. You can place pads inside of your bra to help prevent leaking onto your clothing. Ask your healthcare provider where to purchase bra pads. Get breastfeeding support if needed. Healthcare providers can answer questions about breastfeeding and provide you with support. Ask your healthcare provider who you can contact if you need breastfeeding support. Breast care for non-breastfeeding mothers: Milk will fill your breasts even if you bottle feed your baby. Do the following to help stop your milk from filling your breasts and causing pain:  Wear a bra with support at all times. A sports bra or a tight-fitting bra will help stop your milk from coming in. Apply ice on each breast for 15 to 20 minutes every hour or as directed. Use an ice pack, or put crushed ice in a plastic bag. Cover it with a towel before you apply it to your breast. Ice helps your milk ducts shrink. Keep your breasts away from warm water. Warm water will make it easier for milk to fill your breasts. Stand with your breasts away from warm water in the shower. Limit how much you touch your breasts. This will prevent them from filling with milk. Perineum care: Your perineum is the area between your rectum and vagina. It is normal to have swelling and pain in this area after you give birth.  If you had an episiotomy, your healthcare provider may give you special instructions. Clean your perineum after you use the bathroom. This may prevent infection and help with healing. Use a spray bottle with warm water to clean your perineum. You may also gently spray warm water against your perineum when you urinate. Always wipe front to back. Take a sitz bath as directed. A sitz bath may help relieve swelling and pain. Fill your bath tub or bucket with water up to your hips and sit in the water. Use cold water for 2 days after you deliver. Then use warm water. Ask your healthcare provider for more information about a sitz bath. Apply ice packs for the first 24 hours or as directed. Use a plastic glove filled with ice or buy an ice pack. Wrap the ice pack or plastic glove in a small towel or wash cloth. Place the ice pack on your perineum for 20 minutes at a time. Sit on a donut-shaped pillow. This may relieve pressure on your perineum when you sit. Use wipes that contain medicine or take pills as directed. Your healthcare provider may tell you to use witch hazel pads. You can place witch hazel pads in the refrigerator before you apply them to your perineum. Your provider may also tell you to take NSAIDs. Ask him or her how often to take pills or use the wipes. Do not go swimming or take tub baths for 4 to 6 weeks or as directed. This will help prevent an infection in your vagina or uterus. Bowel and bladder care: It may take 3 to 5 days to have a bowel movement after you deliver your baby. You can do the following to prevent or manage constipation, and get control of your bowel or bladder:  Take stool softeners as directed. A stool softener is medicine that will make your bowel movements softer. This may prevent or relieve constipation. A stool softener may also make bowel movements less painful. Drink plenty of liquids. Ask how much liquid to drink each day and which liquids are best for you. Liquids may help prevent constipation. Eat foods high in fiber. Examples include fruits, vegetables, grains, beans, and lentils. Ask your healthcare provider how much fiber you need each day. Fiber may prevent constipation. Do Kegel exercises as directed. Kegel exercises will help strengthen the muscles that control bowel movements and urination. Ask your healthcare provider for more information on Kegel exercises. Apply cold compresses or medicine to hemorrhoids as directed. This may relieve swelling and pain. Your healthcare provider may tell you to apply ice or wipes that contain medicine to your hemorrhoids. He or she may also tell you to use a sitz bath. Ask your provider for more information on how to manage hemorrhoids. Nutrition: Good nutrition is important in the postpartum period. It will help you return to a healthy weight, increase your energy levels, and prevent constipation. It will also help you get enough nutrients and calories if you are going to breastfeed your baby. Eat a variety of healthy foods. Healthy foods include fruits, vegetables, whole-grain breads, low-fat dairy products, beans, lean meats, and fish. You may need 500 to 700 extra calories each day if you breastfeed your baby. You may also need extra protein. Limit foods with added sugar and high amounts of fat. These foods are high in calories and low in healthy nutrients. Read food labels so you know how much sugar and fat is in the food you want to eat. Drink 8 to 10 glasses of water per day. Water will help you make plenty of milk for your baby. It will also help prevent constipation. Drink a glass of water every time you breastfeed your baby. Take vitamins as directed. Ask your healthcare provider what vitamins you need. Limit caffeine and alcohol if you are breastfeeding. Caffeine and alcohol can get into your breast milk. Caffeine and alcohol can make your baby fussy.  They can also interfere with your baby's sleep. Ask your healthcare provider if you can drink alcohol or caffeine. Rest and sleep: You may feel very tired in the postpartum period. Enough sleep will help you heal and give you energy to care for your baby. The following may help you get sleep and rest:  Nap when your baby naps. Your baby may nap several times during the day. Get rest during this time. Limit visitors. Many people may want to see you and your baby. Ask friends or family to visit on different days. This will give you time to rest.     Do not plan too much for one day. Put off household chores so that you have time to rest. Gradually do more each day. Ask for help from family, friends, or neighbors. Ask them to help you with laundry, cleaning, or errands. Also ask someone to watch the baby while you take a nap or relax. Ask your partner to help with the care of your baby. Pump some of your breast milk so your partner can feed your baby during the night. Exercise after delivery: Wait until your healthcare provider says it is okay to exercise. Exercise can help you lose weight, increase your energy levels, and manage your mood. It can also prevent constipation and blood clots. Start with gentle exercises such as walking. Do more as you have more energy. You may need to avoid abdominal exercises for 1 to 2 weeks after you deliver. Talk to your healthcare provider about an exercise plan that is right for you. Sexual activity after delivery:   Do not have sex until your healthcare provider says it is okay. You may need to wait 4 to 6 weeks before you have sex. This may prevent infection and allow time to heal.     Your menstrual cycle may begin as soon as 3 weeks after you deliver. Your period may be delayed if you breastfeed your baby. You can become pregnant before you get your first postpartum period. Talk to your healthcare provider about birth control that is right for you.  Some types of birth control are not safe during breastfeeding. For support and more information: Join a support group for new mothers. Ask for help from family and friends with chores, errands, and care of your baby. Office of Carroll Zambrano,  Department of Health and Human Services  [address], [address] [facility] , [address] [facility]  [address], [address] [facility] , [address]  Phone: [phone]  Web Address: www.womenshealth.gov  [facility] Postpartum [address] [facility] , [address]  [address] , [address]  Web Address: [employer].0-6.com. org/pregnancy/postpartum-care. aspx  Follow up with your doctor or obstetrician as directed: You will need to follow up within 2 to 6 weeks of delivery. Write down your questions so you remember to ask them at your visits. © Copyright [facility] Information is for End User's use only and may not be sold, redistributed or otherwise used for commercial purposes. All illustrations and images included in CareNotes® are the copyrighted property of [employer] Inc[employer] or [address] [place] [address]  The above information is an  only. It is not intended as medical advice for individual conditions or treatments. Talk to your doctor, nurse or pharmacist before following any medical regimen to see if it is safe and effective for you.

## 2023-09-13 NOTE — H&P
[facility]   Claudia Alexandra 29 y.o. female MRN: [id number]  Unit/Bed#: -01 Encounter: [id number]      Assessment/Plan:    Lorene Meade is a 29 y.o.  at 40w0d admitted for elective an induction of labor    SVE: Cervical Dilation: 1-2  Cervical Effacement: 50  Cervical Consistency: Medium  Fetal Station: -1  Position: Unknown    * Encounter for induction of labor  Assessment & Plan  Admit to OB/GYN service  Follow up CBC, RPR, Blood Type  EFW: 7lbs  VTX by transabdominal ultrasound   Induction method FB and pit  GBS pos status: PCN for prophylaxis   Analgesia and/or epidural at patient request  Anticipate                Patient of: [address]  This patient will be an INPATIENT  and I certify the anticipated length of stay is >2 Midnights  Discussed with Dr. Marcelino Jordan:    Chief Complaint: I am here for my induction    HPI: Claudia Alexandra is a 29 y.o. Jennifer Adas with an NORBERT of 2023, by Last Menstrual Period at 40w0d who is being admitted for induction of labor. She denies having uterine contractions, has no LOF, and reports no VB. She states she has felt good FM. Kevin Negus This pregnancy is complicated by asthma. All other review of systems is negative. Pregnancy Plan:  Pregnancy: Thad Jose Armando  Fetal sex: Female  Support person: floyd clark     Delivery Plans  Planned delivery method: Vaginal  Planned delivery location: AN L&D  Acceptable blood products:  All     Post-Delivery Plans  Feeding intentions: Breast Milk      Patient Active Problem List   Diagnosis   • Mild intermittent asthma without complication   • Current moderate episode of major depressive disorder without prior episode (HCC)   • High-functioning autism spectrum disorder   • H/O major depression   • Encounter for induction of labor   • Orthostatic hypotension   • Raynaud's phenomenon without gangrene   • 40 weeks gestation of pregnancy   • Obesity affecting pregnancy in third trimester   • GBS bacteriuria • History of gestational hypertension       OB History    Para Term  AB Living   3 1 1 0 1 1   SAB IAB Ectopic Multiple Live Births   0 0 0 0 1      # Outcome Date GA Lbr Anibal/2nd Weight Sex Delivery Anes PTL Lv   3 Current            2 Term 19 41w6d  3572 g (7 lb 14 oz) F Vag-Spont EPI N JUDY   1 AB                Past Medical History:   Diagnosis Date   • Asthma     childhood asthma  inhaler prn   • Autism spectrum disorder    • Depression     diag with depression/anxiety age 27 -no meds now   • Migraine     diag 2 yrs ago- takes extra-strength tylenol   • Termination of pregnancy (fetus)    • Urinary tract infection     hx of  last episode    • Varicella     received vaccines       Past Surgical History:   Procedure Laterality Date   • NO PAST SURGERIES     • WISDOM TOOTH EXTRACTION  age 23       Social History     Tobacco Use   • Smoking status: Former     Packs/day: 0.50     Years: 10.00     Total pack years: 5.00     Types: Cigarettes     Quit date: 10/1/2018     Years since quittin.9   • Smokeless tobacco: Never   • Tobacco comments:     smokes 1 cigarette per dAY! Substance Use Topics   • Alcohol use: Not Currently     Comment: occasional-none since pregnancy       No Known Allergies    Medications Prior to Admission   Medication   • Prenatal Vit w/Kp-Hkowlgofb-KE (PNV PO)           OBJECTIVE:  Vitals:  Temp:  [98.3 °F (36.8 °C)] 98.3 °F (36.8 °C)  HR:  [91] 91  Resp:  [18] 18  BP: (124-136)/(90-92) 136/90  Body mass index is 38.09 kg/m². Physical Exam:  General: Well appearing, no distress  Respiratory: Unlabored breathing  Cardiovascular: Regular rate. Abdomen: Soft, gravid, nontender  Fundal Height: Appropriate for gestational age. Extremities: Warm and well perfused. Non tender.   Psychiatric: Behavioral normal        FHT:  Baseline Rate: 130 bpm  Variability: Moderate 6-25 bpm  Accelerations: 15 x 15 or greater, At variable times  Decelerations: None  FHR Category: Category I    TOCO:   Contraction Frequency (minutes): 5-10  Contraction Duration (seconds): 30  Contraction Quality: Not applicable      Prenatal Labs:   Blood Type:   Lab Results   Component Value Date/Time    ABO Grouping A 09/12/2023 10:15 PM     , D (Rh type):   Lab Results   Component Value Date/Time    Rh Factor Positive 09/12/2023 10:15 PM     , HCT/HGB:   Lab Results   Component Value Date/Time    Hematocrit 35.1 09/12/2023 10:15 PM    Hemoglobin 11.5 09/12/2023 10:15 PM      , Platelets:   Lab Results   Component Value Date/Time    Platelets 442 33/90/9109 10:15 PM      , 1 hour Glucola:   Lab Results   Component Value Date/Time    Glucose 156 (H) 06/17/2023 11:20 AM   , Varicella: No results found for: "VARICELLAIGG"    , Rubella:   Lab Results   Component Value Date/Time    Rubella IgG Quant >175.0 02/25/2023 09:58 AM        , VDRL/RPR:   Lab Results   Component Value Date/Time    RPR Non-Reactive 08/02/2019 10:17 PM      , Hep B:   Lab Results   Component Value Date/Time    Hepatitis B Surface Ag Non-reactive 02/25/2023 09:58 AM     , Hep C: No components found for: "HEPCSAG", "EXTHEPCSAG"   , HIV:   Lab Results   Component Value Date/Time    HIV-1/HIV-2 Ab Non-Reactive 01/11/2019 02:45 PM     , Chlamydia: No results found for: "EXTCHLAMYDIA"  , Gonorrhea:   Lab Results   Component Value Date/Time    N gonorrhoeae, DNA Probe Negative 03/01/2023 08:06 AM    N gonorrhoeae, DNA Probe N. gonorrhoeae Amplified DNA Negative 06/05/2018 04:18 PM     , Group B Strep:    Lab Results   Component Value Date/Time    Strep Grp B PCR Negative 08/18/2023 11:20 AM    Strep Grp B PCR Negative for Beta Hemolytic Strep Grp B by PCR 06/27/2019 04:12 PM      ,      Sebastian Jack, DO  9/13/2023  2:07 AM        Portions of the record may have been created with voice recognition software. Occasional wrong word or "sound a like" substitutions may have occurred due to the inherent limitations of voice recognition software.   Read the chart carefully and recognize, using context, where substitutions have occurred [Patient Intake Form Reviewed] : Patient intake form was reviewed

## 2023-09-13 NOTE — OB LABOR/OXYTOCIN SAFETY PROGRESS
Oxytocin Safety Progress Check Note - Alexis Huerta 29 y.o. female MRN: [id number]     Unit/Bed#: -01 Encounter: [id number]    Dose (grace-units/min) Oxytocin: 22 grace-units/min  Contraction Frequency (minutes): 1.5-5  Contraction Quality: Moderate  Tachysystole: No   Cervical Dilation: 5        Cervical Effacement: 60  Fetal Station: -1  Baseline Rate: 120 bpm  Fetal Heart Rate: 118 BPM  FHR Category: 1               Vital Signs: wnl  Vitals:    09/13/23 0800   BP: 123/78   Pulse: 75   Resp:    Temp:    SpO2:        Notes/comments:         Gumaro Vega MD 9/13/2023 8:51 AM

## 2023-09-13 NOTE — ANESTHESIA POSTPROCEDURE EVALUATION
Post-Op Assessment Note    CV Status:  Stable    Pain management: adequate     Mental Status:  Alert and awake   Hydration Status:  Euvolemic   PONV Controlled:  Controlled   Airway Patency:  Patent      Post Op Vitals Reviewed: Yes      Staff: CRNA     Post-op block assessment: no complications, site cleaned and catheter intact      No notable events documented.     BP   115/57   Temp      Pulse  88   Resp      SpO2

## 2023-09-13 NOTE — OB LABOR/OXYTOCIN SAFETY PROGRESS
Labor Progress Note - Ramiro Bedolla 29 y.o. female MRN: [id number]    Unit/Bed#: -01 Encounter: [id number]       Contraction Frequency (minutes): 2-5.5  Contraction Quality: Mild  Tachysystole: No                  Baseline Rate: 130 bpm  Fetal Heart Rate: 129 BPM                 Vital Signs:   Vitals:    09/12/23 2050   BP: 136/90   Pulse: 91   Resp: 18   Temp: 98.3 °F (36.8 °C)   SpO2: 98%       Notes/comments:     SVE as above. PROCEDURE:  LEYVA BALLOON PLACEMENT    A 24F leyva with a 30cc balloon was selected, a speculum examination was performed and the cervix was located. A leyva balloon was introduced over sterile gloved hands. Balloon advanced through cervix with a ringed forceps beyond the internal cervical os. A small amount amount of sterile saline solution was instilled in the balloon to confirm placement. Placement was confirmed to be beyond the internal cervical os. A total of 60cc of sterile saline solution was placed into the balloon. Pt tolerated well. Instructions left with RN to place leyva to gravity with a 1L bag of IV fluid. Notify DO/MD when leyva dislodged.     DO Den León DO 9/13/2023 12:27 AM

## 2023-09-13 NOTE — OB LABOR/OXYTOCIN SAFETY PROGRESS
Oxytocin Safety Progress Check Note - [facility] 29 y.o. female MRN: [id number]    Unit/Bed#: -01 Encounter: [id number]    Dose (grace-units/min) Oxytocin: 28 grace-units/min  Contraction Frequency (minutes): 1.5-4.5  Contraction Quality: Moderate  Tachysystole: No   Cervical Dilation: 6        Cervical Effacement: 60  Fetal Station: -1  Baseline Rate: 130 bpm  Fetal Heart Rate: 132 BPM  FHR Category: 1               Vital Signs: wnl  Vitals:    09/13/23 1048   BP: 124/79   Pulse:    Resp:    Temp:    SpO2:        Notes/comments:         Freya Serna MD 9/13/2023 11:13 AM

## 2023-09-13 NOTE — PLAN OF CARE
Problem: DISCHARGE PLANNING - CARE MANAGEMENT  Goal: Discharge to post-acute care or home with appropriate resources  Description: INTERVENTIONS:  - Conduct assessment to determine patient/family and health care team treatment goals, and need for post-acute services based on payer coverage, community resources, and patient preferences, and barriers to discharge  - Address psychosocial, clinical, and financial barriers to discharge as identified in assessment in conjunction with the patient/family and health care team  - Arrange appropriate level of post-acute services according to patient’s   needs and preference and payer coverage in collaboration with the physician and health care team  - Communicate with and update the patient/family, physician, and health care team regarding progress on the discharge plan  - Arrange appropriate transportation to post-acute venues  Outcome: Progressing     Problem: PAIN - ADULT  Goal: Verbalizes/displays adequate comfort level or baseline comfort level  Description: Interventions:  - Encourage patient to monitor pain and request assistance  - Assess pain using appropriate pain scale  - Administer analgesics based on type and severity of pain and evaluate response  - Implement non-pharmacological measures as appropriate and evaluate response  - Consider cultural and social influences on pain and pain management  - Notify physician/advanced practitioner if interventions unsuccessful or patient reports new pain  Outcome: Progressing     Problem: INFECTION - ADULT  Goal: Absence or prevention of progression during hospitalization  Description: INTERVENTIONS:  - Assess and monitor for signs and symptoms of infection  - Monitor lab/diagnostic results  - Monitor all insertion sites, i.e. indwelling lines, tubes, and drains  - Monitor endotracheal if appropriate and nasal secretions for changes in amount and color  - [place] appropriate cooling/warming therapies per order  - Administer medications as ordered  - Instruct and encourage patient and family to use good hand hygiene technique  - Identify and instruct in appropriate isolation precautions for identified infection/condition  Outcome: Progressing  Goal: Absence of fever/infection during neutropenic period  Description: INTERVENTIONS:  - Monitor WBC    Outcome: Progressing     Problem: SAFETY ADULT  Goal: Patient will remain free of falls  Description: INTERVENTIONS:  - Educate patient/family on patient safety including physical limitations  - Instruct patient to call for assistance with activity   - Consult OT/PT to assist with strengthening/mobility   - Keep Call bell within reach  - Keep bed low and locked with side rails adjusted as appropriate  - Keep care items and personal belongings within reach  - Initiate and maintain comfort rounds  - Make Fall Risk Sign visible to staff  - Apply yellow socks and bracelet for high fall risk patients  - Consider moving patient to room near nurses station  Outcome: Progressing  Goal: Maintain or return to baseline ADL function  Description: INTERVENTIONS:  -  Assess patient's ability to carry out ADLs; assess patient's baseline for ADL function and identify physical deficits which impact ability to perform ADLs (bathing, care of mouth/teeth, toileting, grooming, dressing, etc.)  - Assess/evaluate cause of self-care deficits   - Assess range of motion  - Assess patient's mobility; develop plan if impaired  - Assess patient's need for assistive devices and provide as appropriate  - Encourage maximum independence but intervene and supervise when necessary  - Involve family in performance of ADLs  - Assess for home care needs following discharge   - Consider OT consult to assist with ADL evaluation and planning for discharge  - Provide patient education as appropriate  Outcome: Progressing  Goal: Maintains/Returns to pre admission functional level  Description: INTERVENTIONS:  - Perform BMAT or MOVE assessment daily.   - Set and communicate daily mobility goal to care team and patient/family/caregiver. - Collaborate with rehabilitation services on mobility goals if consulted  - Out of bed for toileting  - Record patient progress and toleration of activity level   Outcome: Progressing     Problem: Knowledge Deficit  Goal: Patient/family/caregiver demonstrates understanding of disease process, treatment plan, medications, and discharge instructions  Description: Complete learning assessment and assess knowledge base.   Interventions:  - Provide teaching at level of understanding  - Provide teaching via preferred learning methods  Outcome: Progressing     Problem: DISCHARGE PLANNING  Goal: Discharge to home or other facility with appropriate resources  Description: INTERVENTIONS:  - Identify barriers to discharge w/patient and caregiver  - Arrange for needed discharge resources and transportation as appropriate  - Identify discharge learning needs (meds, wound care, etc.)  - Arrange for interpretive services to assist at discharge as needed  - Refer to Case Management Department for coordinating discharge planning if the patient needs post-hospital services based on physician/advanced practitioner order or complex needs related to functional status, cognitive ability, or social support system  Outcome: Progressing     Problem: POSTPARTUM  Goal: Experiences normal postpartum course  Description: INTERVENTIONS:  - Monitor maternal vital signs  - Assess uterine involution and lochia  Outcome: Progressing  Goal: Appropriate maternal -  bonding  Description: INTERVENTIONS:  - Identify family support  - Assess for appropriate maternal/infant bonding   -Encourage maternal/infant bonding opportunities  - Referral to  or  as needed  Outcome: Progressing  Goal: Establishment of infant feeding pattern  Description: INTERVENTIONS:  - Assess breast/bottle feeding  - Refer to lactation as needed  Outcome: Progressing  Goal: Incision(s), wounds(s) or drain site(s) healing without S/S of infection  Description: INTERVENTIONS  - Assess and document dressing, incision, wound bed, drain sites and surrounding tissue  - Provide patient and family education  Outcome: Progressing

## 2023-09-14 VITALS
DIASTOLIC BLOOD PRESSURE: 62 MMHG | RESPIRATION RATE: 18 BRPM | OXYGEN SATURATION: 100 % | WEIGHT: 257.94 LBS | HEART RATE: 69 BPM | TEMPERATURE: 98 F | HEIGHT: 69 IN | BODY MASS INDEX: 38.2 KG/M2 | SYSTOLIC BLOOD PRESSURE: 131 MMHG

## 2023-09-14 PROCEDURE — NC001 PR NO CHARGE: Performed by: STUDENT IN AN ORGANIZED HEALTH CARE EDUCATION/TRAINING PROGRAM

## 2023-09-14 PROCEDURE — 99024 POSTOP FOLLOW-UP VISIT: CPT | Performed by: STUDENT IN AN ORGANIZED HEALTH CARE EDUCATION/TRAINING PROGRAM

## 2023-09-14 RX ORDER — ACETAMINOPHEN 325 MG/1
650 TABLET ORAL EVERY 4 HOURS PRN
Qty: 30 TABLET | Refills: 0
Start: 2023-09-14

## 2023-09-14 RX ORDER — SENNOSIDES 8.6 MG
8.6 TABLET ORAL DAILY
Qty: 30 TABLET | Refills: 0
Start: 2023-09-14

## 2023-09-14 RX ORDER — IBUPROFEN 600 MG/1
600 TABLET ORAL EVERY 6 HOURS PRN
Qty: 30 TABLET | Refills: 0
Start: 2023-09-14

## 2023-09-14 RX ADMIN — DOCUSATE SODIUM 100 MG: 100 CAPSULE, LIQUID FILLED ORAL at 08:47

## 2023-09-14 RX ADMIN — ACETAMINOPHEN 650 MG: 325 TABLET, FILM COATED ORAL at 12:30

## 2023-09-14 RX ADMIN — SENNOSIDES 8.6 MG: 8.6 TABLET, FILM COATED ORAL at 08:47

## 2023-09-14 RX ADMIN — IBUPROFEN 600 MG: 600 TABLET, FILM COATED ORAL at 08:54

## 2023-09-14 NOTE — PLAN OF CARE
Problem: DISCHARGE PLANNING - CARE MANAGEMENT  Goal: Discharge to post-acute care or home with appropriate resources  Description: INTERVENTIONS:  - Conduct assessment to determine patient/family and health care team treatment goals, and need for post-acute services based on payer coverage, community resources, and patient preferences, and barriers to discharge  - Address psychosocial, clinical, and financial barriers to discharge as identified in assessment in conjunction with the patient/family and health care team  - Arrange appropriate level of post-acute services according to patient’s   needs and preference and payer coverage in collaboration with the physician and health care team  - Communicate with and update the patient/family, physician, and health care team regarding progress on the discharge plan  - Arrange appropriate transportation to post-acute venues  Outcome: Progressing     Problem: PAIN - ADULT  Goal: Verbalizes/displays adequate comfort level or baseline comfort level  Description: Interventions:  - Encourage patient to monitor pain and request assistance  - Assess pain using appropriate pain scale  - Administer analgesics based on type and severity of pain and evaluate response  - Implement non-pharmacological measures as appropriate and evaluate response  - Consider cultural and social influences on pain and pain management  - Notify physician/advanced practitioner if interventions unsuccessful or patient reports new pain  Outcome: Progressing     Problem: INFECTION - ADULT  Goal: Absence or prevention of progression during hospitalization  Description: INTERVENTIONS:  - Assess and monitor for signs and symptoms of infection  - Monitor lab/diagnostic results  - Monitor all insertion sites, i.e. indwelling lines, tubes, and drains  - Monitor endotracheal if appropriate and nasal secretions for changes in amount and color  - Pace appropriate cooling/warming therapies per order  - Administer medications as ordered  - Instruct and encourage patient and family to use good hand hygiene technique  - Identify and instruct in appropriate isolation precautions for identified infection/condition  Outcome: Progressing  Goal: Absence of fever/infection during neutropenic period  Description: INTERVENTIONS:  - Monitor WBC    Outcome: Progressing     Problem: SAFETY ADULT  Goal: Patient will remain free of falls  Description: INTERVENTIONS:  - Educate patient/family on patient safety including physical limitations  - Instruct patient to call for assistance with activity   - Consult OT/PT to assist with strengthening/mobility   - Keep Call bell within reach  - Keep bed low and locked with side rails adjusted as appropriate  - Keep care items and personal belongings within reach  - Initiate and maintain comfort rounds  - Make Fall Risk Sign visible to staff  - Apply yellow socks and bracelet for high fall risk patients  - Consider moving patient to room near nurses station  Outcome: Progressing  Goal: Maintain or return to baseline ADL function  Description: INTERVENTIONS:  -  Assess patient's ability to carry out ADLs; assess patient's baseline for ADL function and identify physical deficits which impact ability to perform ADLs (bathing, care of mouth/teeth, toileting, grooming, dressing, etc.)  - Assess/evaluate cause of self-care deficits   - Assess range of motion  - Assess patient's mobility; develop plan if impaired  - Assess patient's need for assistive devices and provide as appropriate  - Encourage maximum independence but intervene and supervise when necessary  - Involve family in performance of ADLs  - Assess for home care needs following discharge   - Consider OT consult to assist with ADL evaluation and planning for discharge  - Provide patient education as appropriate  Outcome: Progressing  Goal: Maintains/Returns to pre admission functional level  Description: INTERVENTIONS:  - Perform BMAT or MOVE assessment daily.   - Set and communicate daily mobility goal to care team and patient/family/caregiver. - Collaborate with rehabilitation services on mobility goals if consulted  - Record patient progress and toleration of activity level   Outcome: Progressing     Problem: Knowledge Deficit  Goal: Patient/family/caregiver demonstrates understanding of disease process, treatment plan, medications, and discharge instructions  Description: Complete learning assessment and assess knowledge base.   Interventions:  - Provide teaching at level of understanding  - Provide teaching via preferred learning methods  Outcome: Progressing     Problem: DISCHARGE PLANNING  Goal: Discharge to home or other facility with appropriate resources  Description: INTERVENTIONS:  - Identify barriers to discharge w/patient and caregiver  - Arrange for needed discharge resources and transportation as appropriate  - Identify discharge learning needs (meds, wound care, etc.)  - Arrange for interpretive services to assist at discharge as needed  - Refer to Case Management Department for coordinating discharge planning if the patient needs post-hospital services based on physician/advanced practitioner order or complex needs related to functional status, cognitive ability, or social support system  Outcome: Progressing     Problem: POSTPARTUM  Goal: Experiences normal postpartum course  Description: INTERVENTIONS:  - Monitor maternal vital signs  - Assess uterine involution and lochia  Outcome: Progressing  Goal: Appropriate maternal -  bonding  Description: INTERVENTIONS:  - Identify family support  - Assess for appropriate maternal/infant bonding   -Encourage maternal/infant bonding opportunities  - Referral to  or  as needed  Outcome: Progressing  Goal: Establishment of infant feeding pattern  Description: INTERVENTIONS:  - Assess breast/bottle feeding  - Refer to lactation as needed  Outcome: Progressing  Goal: Incision(s), wounds(s) or drain site(s) healing without S/S of infection  Description: INTERVENTIONS  - Assess and document dressing, incision, wound bed, drain sites and surrounding tissue  - Provide patient and family education  Outcome: Progressing

## 2023-09-14 NOTE — LACTATION NOTE
This note was copied from a baby's chart. CONSULT - LACTATION  Baby Girl Deltwyla Cuff) Bruno Mazariegos 1 days female MRN: [id number]    [address] [facility] AN NURSERY Room / Bed: (N)/(N) Encounter: [id number]    Maternal Information     MOTHER:  Shelly Chambers  Maternal Age: 29 y.o.   OB History: # 1 - Date: None, Sex: None, Weight: None, GA: None, Delivery: None, Apgar1: None, Apgar5: None, Living: None, Birth Comments: None    # 2 - Date: 19, Sex: Female, Weight: 3572 g (7 lb 14 oz), GA: 41w6d, Delivery: Vaginal, Spontaneous, Apgar1: 8, Apgar5: 9, Living: Living, Birth Comments: None    # 3 - Date: 23, Sex: Female, Weight: 3690 g (8 lb 2.2 oz), GA: 40w1d, Delivery: Vaginal, Spontaneous, Apgar1: 7, Apgar5: 9, Living: Living, Birth Comments: None   Previouse breast reduction surgery? No    Lactation history:   Has patient previously breast fed: Yes   How long had patient previously breast fed: over 6 mo   Previous breast feeding complications: Other (Comment) (hx of TT; Back to work)     Past Surgical History:   Procedure Laterality Date   • NO PAST SURGERIES     • WISDOM TOOTH EXTRACTION  age 23        Birth information:  YOB: 2021   Time of birth: 2:28 PM   Sex: female   Delivery type: Vaginal, Spontaneous   Birth Weight: 3690 g (8 lb 2.2 oz)   Percent of Weight Change: -1%     Gestational Age: 45w2d   [unfilled]    Assessment     Breast and nipple assessment: pendulous breasts with dark areolas and everted nipples that face toward outer quadrants. Nipples face slightly downward. Upon breast compression, areola billows around the nipple making the nipple appear slightly inverted    [place] Assessment: upon oral assessment; narrow gape. Palate is high, narrow. Tongue tip extends. Bilateral laterization to mid-anterior. Elevation does not occur. Upon digital suck exam, no elevation on the digit, minimal cupping, pistion like movement.  Finger sweep under the tongue presents with a frenulum that is palpable, thick mucosal at base, attached mid posterior to base of lower alveolar ridge. Feeding assessment: latch difficulty (mom states once baby latches, chompy on the breast.)  LATCH:  Latch: Repeated attempts, hold nipple in mouth, stimulate to suck   Audible Swallowing: A few with stimulation   Type of Nipple: Everted (After stimulation)   Comfort (Breast/Nipple): Soft/non-tender   Hold (Positioning): Partial assist, teach one side, mother does other, staff holds   [facility] Score: 7          Feeding recommendations:  Feeding plan created for mom as baby is not transferring milk adequately. Demonstration and teach-back of thumb across from baby's nose to assist in nipple reaching the palate. Demonstration and teach back of positioning at the breast to assist with latch and getting some peristalic movement for milk transfer. Demonstration of rolled blanket under the right breast to lift the nipple. Ed. On football hold on the right breast. With education on  Alignment of the nipple to the high palate, and placement of baby, deep latch and some peristalic movement noted for a 5-7 suck burst for approx. 10 min. Diaper change of urine output    Mom has an evenflo at home    Baby was then notability tired and came off the breast. Ed. On s2s. Ed. On mix feeding plan if baby does not have adequate output. Enc. To call lactation for next latch    Called baby and me and left vm to call mom and make appt. Asap.         Edwin Fontenot 9/14/2023 10:02 AM

## 2023-09-14 NOTE — PROGRESS NOTES
Progress Note - OB/GYN   Pratibha Mcconnell 29 y.o. female MRN: [id number]  Unit/Bed#:  305-01 Encounter: [id number]      Assessment/Plan    Pratibha Mcconnell is a 29 y.o. [id number] who is PPD 1 s/p  at 40w1d     *  (spontaneous vaginal delivery)  Assessment & Plan  Routine postpartum care  Encourage ambulation  Lactation support as needed  Pain: Motrin/Tylenol          Disposition: Anticipate discharge home postpartum Day #1-2  Barriers to discharge: none     Subjective/Objective     Subjective: Postpartum state    Pain: no  Tolerating PO: yes  Voiding: yes  Flatus: yes  BM: no  Ambulating: yes  Breastfeeding: Breastfeeding  Chest pain: no  Shortness of breath: no  Leg pain: no  Lochia: minimal    Objective:     Vitals:  Vitals:    23 1755 23 0006 23 0405   BP: 130/64 127/60 125/66 121/65   BP Location: Left arm Left arm Right arm Right arm   Pulse: 94 85 64 82   Resp: 16 16 18 18   Temp: 97.8 °F (36.6 °C) 98.6 °F (37 °C) 97.5 °F (36.4 °C) 97.7 °F (36.5 °C)   TempSrc: Oral Oral Oral Oral   SpO2: 96% 99% 100% 100%   Weight:       Height:           Physical Exam:   GEN: appears well, alert and oriented x 3, pleasant and cooperative   CV: Regular rate  RESP: non labored breathing  ABDOMEN: soft, no tenderness, no distention, Uterine fundus firm and non-tender, at the umbilicus. EXTREMITIES: non-tender  NEURO Alert and oriented to person, place, and time.        Lab Results   Component Value Date    WBC 10.56 (H) 2023    HGB 11.5 2023    HCT 35.1 2023    MCV 83 2023     2023         Haylee Vidal MD  PGY-1  23

## 2023-09-14 NOTE — PLAN OF CARE
Problem: DISCHARGE PLANNING - CARE MANAGEMENT  Goal: Discharge to post-acute care or home with appropriate resources  Description: INTERVENTIONS:  - Conduct assessment to determine patient/family and health care team treatment goals, and need for post-acute services based on payer coverage, community resources, and patient preferences, and barriers to discharge  - Address psychosocial, clinical, and financial barriers to discharge as identified in assessment in conjunction with the patient/family and health care team  - Arrange appropriate level of post-acute services according to patient’s   needs and preference and payer coverage in collaboration with the physician and health care team  - Communicate with and update the patient/family, physician, and health care team regarding progress on the discharge plan  - Arrange appropriate transportation to post-acute venues  9/14/2023 1100 by Summer Savage RN  Outcome: Progressing  9/14/2023 1053 by Summer Savage RN  Outcome: Progressing  9/14/2023 1052 by Summer Savage RN  Outcome: Progressing     Problem: INFECTION - ADULT  Goal: Absence or prevention of progression during hospitalization  Description: INTERVENTIONS:  - Assess and monitor for signs and symptoms of infection  - Monitor lab/diagnostic results  - Monitor all insertion sites, i.e. indwelling lines, tubes, and drains  - Monitor endotracheal if appropriate and nasal secretions for changes in amount and color  - Percy appropriate cooling/warming therapies per order  - Administer medications as ordered  - Instruct and encourage patient and family to use good hand hygiene technique  - Identify and instruct in appropriate isolation precautions for identified infection/condition  9/14/2023 1100 by Summer Savage RN  Outcome: Progressing  9/14/2023 1053 by Summer Savage RN  Outcome: Progressing  9/14/2023 1052 by Summer Savage RN  Outcome: Progressing  Goal: Absence of fever/infection during neutropenic period  Description: INTERVENTIONS:  - Monitor WBC    9/14/2023 1100 by Farhad Snyder RN  Outcome: Progressing  9/14/2023 1053 by Farhad Snyder RN  Outcome: Progressing  9/14/2023 1052 by Farhad Snyder RN  Outcome: Progressing     Problem: SAFETY ADULT  Goal: Patient will remain free of falls  Description: INTERVENTIONS:  - Educate patient/family on patient safety including physical limitations  - Instruct patient to call for assistance with activity   - Consult OT/PT to assist with strengthening/mobility   - Keep Call bell within reach  - Keep bed low and locked with side rails adjusted as appropriate  - Keep care items and personal belongings within reach  - Initiate and maintain comfort rounds  - Make Fall Risk Sign visible to staff  - Apply yellow socks and bracelet for high fall risk patients  - Consider moving patient to room near nurses station  9/14/2023 1100 by Farhad Snyder RN  Outcome: Progressing  9/14/2023 1053 by Farhad Snyder RN  Outcome: Progressing  9/14/2023 1052 by Farhad Snyder RN  Outcome: Progressing  Goal: Maintain or return to baseline ADL function  Description: INTERVENTIONS:  -  Assess patient's ability to carry out ADLs; assess patient's baseline for ADL function and identify physical deficits which impact ability to perform ADLs (bathing, care of mouth/teeth, toileting, grooming, dressing, etc.)  - Assess/evaluate cause of self-care deficits   - Assess range of motion  - Assess patient's mobility; develop plan if impaired  - Assess patient's need for assistive devices and provide as appropriate  - Encourage maximum independence but intervene and supervise when necessary  - Involve family in performance of ADLs  - Assess for home care needs following discharge   - Consider OT consult to assist with ADL evaluation and planning for discharge  - Provide patient education as appropriate  9/14/2023 1100 by Farhad Snyder RN  Outcome: Progressing  9/14/2023 1053 by Farhad Snyder RN  Outcome: Progressing  9/14/2023 1052 by Leida Morel RN  Outcome: Progressing  Goal: Maintains/Returns to pre admission functional level  Description: INTERVENTIONS:  - Perform BMAT or MOVE assessment daily.   - Set and communicate daily mobility goal to care team and patient/family/caregiver. - Collaborate with rehabilitation services on mobility goals if consulted  - Out of bed for toileting  - Record patient progress and toleration of activity level   9/14/2023 1100 by Leida Morel RN  Outcome: Progressing  9/14/2023 1053 by Leida Morel RN  Outcome: Progressing  9/14/2023 1052 by Leida Morel RN  Outcome: Progressing     Problem: Knowledge Deficit  Goal: Patient/family/caregiver demonstrates understanding of disease process, treatment plan, medications, and discharge instructions  Description: Complete learning assessment and assess knowledge base.   Interventions:  - Provide teaching at level of understanding  - Provide teaching via preferred learning methods  9/14/2023 1100 by Leida Morel RN  Outcome: Progressing  9/14/2023 1053 by Leida Morel RN  Outcome: Progressing  9/14/2023 1052 by Leida Morel RN  Outcome: Progressing     Problem: DISCHARGE PLANNING  Goal: Discharge to home or other facility with appropriate resources  Description: INTERVENTIONS:  - Identify barriers to discharge w/patient and caregiver  - Arrange for needed discharge resources and transportation as appropriate  - Identify discharge learning needs (meds, wound care, etc.)  - Arrange for interpretive services to assist at discharge as needed  - Refer to Case Management Department for coordinating discharge planning if the patient needs post-hospital services based on physician/advanced practitioner order or complex needs related to functional status, cognitive ability, or social support system  9/14/2023 1100 by Leida Morel RN  Outcome: Progressing  9/14/2023 1053 by Leida Morel RN  Outcome: Progressing  9/14/2023 1052 by Mare Hopper LACEY Andrade  Outcome: Progressing     Problem: POSTPARTUM  Goal: Experiences normal postpartum course  Description: INTERVENTIONS:  - Monitor maternal vital signs  - Assess uterine involution and lochia  2023 1100 by Nadia Joseph RN  Outcome: Progressing  2023 1053 by Nadia Joseph RN  Outcome: Progressing  2023 1052 by Nadia Joseph RN  Outcome: Progressing  Goal: Appropriate maternal -  bonding  Description: INTERVENTIONS:  - Identify family support  - Assess for appropriate maternal/infant bonding   -Encourage maternal/infant bonding opportunities  - Referral to  or  as needed  2023 1100 by Nadia Joseph RN  Outcome: Progressing  2023 1053 by Nadia Joseph RN  Outcome: Progressing  2023 1052 by Nadia Joseph RN  Outcome: Progressing  Goal: Establishment of infant feeding pattern  Description: INTERVENTIONS:  - Assess breast/bottle feeding  - Refer to lactation as needed  2023 1100 by Nadia Joseph RN  Outcome: Progressing  2023 1053 by Nadia Joseph RN  Outcome: Progressing  2023 1052 by Nadia Joseph RN  Outcome: Progressing  Goal: Incision(s), wounds(s) or drain site(s) healing without S/S of infection  Description: INTERVENTIONS  - Assess and document dressing, incision, wound bed, drain sites and surrounding tissue  - Provide patient and family education  2023 1100 by Nadia Joseph RN  Outcome: Progressing  2023 1053 by Nadia Joseph RN  Outcome: Progressing  2023 1052 by Nadia Joseph RN  Outcome: Progressing

## 2023-09-15 NOTE — UTILIZATION REVIEW
NOTIFICATION OF INPATIENT ADMISSION   MATERNITY/DELIVERY AUTHORIZATION REQUEST   SERVICING FACILITY:   [address] [place] [address] - L&D, , NICU  [facility]. [address]  Tax ID: [id number]  NPI: [id number]   ATTENDING PROVIDER:  Attending Name and NPI#: Yobany Salcido, [id number] [place] [address] [[id number]]  Address: [address] [place] [address]. [address]  Phone: [phone]   ADMISSION INFORMATION:  [facility] Service[facility]patient [address] [facility]   Place of Service Code: 21  Inpatient Admission Date/Time: 23  7:45 PM  Discharge Date/Time: 2023  6:54 PM  Admitting Diagnosis Code/Description:  40 weeks gestation of pregnancy [Z3A.40]  Encounter for elective induction of labor [Z34.90]  Encounter for full-term uncomplicated delivery [N83]     Mother: Giuliana Torres 1990 Estimated Date of Delivery: 23  Delivering clinician: Yobany Salcido    OB History        3    Para   2    Term   2       0    AB   1    Living   2       SAB   0    IAB   0    Ectopic   0    Multiple   0    Live Births   2               Robbins Name & MRN:   Information for the patient's :  Sim Null Girl Nuvia Camarena) [[id number]]     Robbins Delivery Information:  Sex: female  Delivered 2023 2:28 PM by Vaginal, Spontaneous; Gestational Age: 45w2d    Robbins Measurements:  Weight: 8 lb 2.2 oz (3690 g); Height: 19"    APGAR 1 minute 5 minutes 10 minutes   Totals: 7 9       Birth Information: 29 y.o. female MRN: [id number] Unit/Bed#: -01   Birthweight: No birth weight on file. Gestational Age: <None> Delivery Type:    APGARS Totals:        UTILIZATION REVIEW CONTACT:  Bry Champion, Utilization   Network Utilization Review Department  Phone: [phone]  Fax [phone]  Email: [email or web address]  Contact for approvals/pending authorizations, clinical reviews, and discharge.      PHYSICIAN ADVISORY SERVICES:  Medical Necessity Denial & Btuu-zp-Qrsr Review  Phone: [phone]  Fax: [phone]  Email: [email or web address]. Jeremy Hernandez MD  Resident  OB/GYN  Discharge Summary     Attested  Date of Service:  2023  6:44 AM     Attested            Attestation signed by Mary Tanner MD at 2023 11:34 AM     Agree with above documentation by Dr. Demetrius Harp. Patient seen and evaluated separately from the residents.  Haider Elliott MD                    Discharge Summary - Vi Christianson 29 y.o. female MRN: [id number]     Unit/Bed#: -01 Encounter: [id number]     Admission Date: 2023      Discharge Date: 23     Admitting Attending: Dr. Brooke Flores   Delivering Attending: Dr. Manuel Median   Discharge Attending: Dr. Mane Mckeon     Diagnosis:   1. Pregnancy at 40 weeks of gestation   2. Encounter for induction of labor   3. Mild asthma   4. Raynaud's phenomenon   5. GBS bacteruria   6. Hx of gestational hypertension   7. Spontaneous vaginal delivery      Procedures: Spontaneous Vaginal Delivery, repair of supra-urethral and right vaginal side wall laceration      Complications: none apparent     Hospital Course: Patient was admitted for elective induction of labor. On arrival she was found to be 1-2/50/-2. She was started on penicillin for GBS bacteruria. A leyva balloon was placed and she was started on a low dose pitocin titration. The leyva balloon was expelled and she received an epidural for analgesia. She spontaneously ruptured for clear fluid. She progressed to complete and began pushing. She delivered via  with a supraurethral and right vaginal sidewall lacerations. She had an uncomplicated postpartum course.      Condition at discharge: good      On day of discharge, patient was tolerating PO, passing flatus, urinating, and ambulating. Her pain was well controlled with oral analgesics.  She was discharged home on postpartum day #1 with standard post partum instructions to follow up with her physician in 3-6 weeks for a postpartum appointment.      Discharge instructions/Information to patient and family:   - Do not place anything (no partner, tampons or douche) in your vagina for 6 weeks. -You may walk for exercise for the first 6 weeks then gradually return to your usual activities.   -Please do not drive for 1 week if you have no stitches and for 2 weeks if you have stitches or underwent a  delivery.    -You may take baths or shower per your preference.   -Please look at your bust (breasts) in the mirror daily and call for redness or tenderness or increased warmth.   -Please call us for temperature > 100.4*F or 38* C, worsening pain or a foul discharge.      Discharge Medications:   Prenatal vitamin daily for 6 months or the duration of nursing whichever is longer. Motrin 600 mg orally every 6 hours as needed for pain  Tylenol (over the counter) per bottle directions as needed for pain: do NOT use with percocet  Hydrocortisone cream 1% (over the counter) applied 1-2x daily to hemorrhoids as needed     Provisions for Follow-Up Care:   Follow up with your doctor in 3-6 weeks for postpartum care.      Planned Readmission: No                           Cosigned by: Pedrito Vargas MD at 2023 11:34 AM     Revision History

## 2023-09-18 ENCOUNTER — OFFICE VISIT (OUTPATIENT)
Dept: POSTPARTUM | Facility: CLINIC | Age: 33
End: 2023-09-18
Payer: COMMERCIAL

## 2023-09-18 VITALS — DIASTOLIC BLOOD PRESSURE: 86 MMHG | HEART RATE: 68 BPM | SYSTOLIC BLOOD PRESSURE: 130 MMHG

## 2023-09-18 PROCEDURE — 99402 PREV MED CNSL INDIV APPRX 30: CPT | Performed by: PEDIATRICS

## 2023-09-18 NOTE — PATIENT INSTRUCTIONS
Continue to feed on demand (at least every 3 hours around he clock) working on achieving an optimal latch by positioning baby with ear, shoulder and hip in line, baby's arms open, not across chest/ in between mom and baby. Starting with nose to nipple, hand at base if baby's head/neck infant up at chest level and starting to feed infant with nose arriving at the nipple. Then, using areolar compression to achieve a deep latch that is comfortable and exchanges optimum amounts of milk. When baby slows at the breast you may offer gentle breast compressions to increase flow. Offer both breasts with each feeding. You may offer up to 4 breasts per feeding, or "switch" nursing: latch baby, when suckling slows offer gentle breast compressions, once no longer actively nursing on that breast burp to stimulate, then switch to the other side, repeat up to 2 more times as needed. Pump and or hand express if not feeding at the breast. Milk should be removed at least every 3 hours during the day and at least once over night not going longer than 5hrs. Continue to supplement as needed. When giving bottles be sure to do so by paced bottle feeding with a slow flow nipple, refer to the hand out and IABLE video. Refer to the hands on pumping video and hand express after pumping. You may cycle the pump from let down mode to expression once milk flow increases, once flow decreases you may go back to let down mode and go though the cycle again, up to 3 times in a pumping session. Sessions should last no longer than 20 min. Check flange sizes and consider adjusting if needed. The nipple should move freely in and out of the flange comfortably. Adjust the settings on the pump as needed, higher suction does not equal more milk, comfort is important to promote milk flow. Follow up as needed. Call with any questions or concerns.

## 2023-09-19 LAB — PLACENTA IN STORAGE: NORMAL

## 2023-09-23 NOTE — PROGRESS NOTES
I have reviewed the notes, assessments, and/or procedures performed by Margarita Rsoe RN, IBCLC, I concur with her/his documentation of Riya Alves MD 09/23/23

## 2023-10-13 ENCOUNTER — POSTPARTUM VISIT (OUTPATIENT)
Dept: OBGYN CLINIC | Facility: CLINIC | Age: 33
End: 2023-10-13

## 2023-10-13 VITALS
BODY MASS INDEX: 34.96 KG/M2 | SYSTOLIC BLOOD PRESSURE: 126 MMHG | WEIGHT: 236 LBS | HEIGHT: 69 IN | DIASTOLIC BLOOD PRESSURE: 76 MMHG

## 2023-10-13 DIAGNOSIS — N76.0 BV (BACTERIAL VAGINOSIS): ICD-10-CM

## 2023-10-13 DIAGNOSIS — B96.89 BV (BACTERIAL VAGINOSIS): ICD-10-CM

## 2023-10-13 PROBLEM — Z34.90 ENCOUNTER FOR INDUCTION OF LABOR: Status: RESOLVED | Noted: 2020-06-08 | Resolved: 2023-10-13

## 2023-10-13 PROBLEM — Z3A.40 40 WEEKS GESTATION OF PREGNANCY: Status: RESOLVED | Noted: 2023-02-28 | Resolved: 2023-10-13

## 2023-10-13 LAB
BV WHIFF TEST VAG QL: ABNORMAL
CLUE CELLS SPEC QL WET PREP: ABNORMAL
PH SMN: 5.5 [PH]
T VAGINALIS VAG QL WET PREP: ABNORMAL
YEAST VAG QL WET PREP: ABNORMAL

## 2023-10-13 RX ORDER — CHOLECALCIFEROL (VITAMIN D3) 10(400)/ML
DROPS ORAL
COMMUNITY
Start: 2023-10-10

## 2023-10-13 RX ORDER — METRONIDAZOLE 7.5 MG/G
1 GEL VAGINAL
Qty: 5 G | Refills: 0 | Status: SHIPPED | OUTPATIENT
Start: 2023-10-13 | End: 2023-10-18

## 2023-10-13 NOTE — PROGRESS NOTES
Diagnoses and all orders for this visit:    Postpartum exam    Postpartum depression  -     Ambulatory referral to [employer]; Future    BV (bacterial vaginosis)  -     metroNIDAZOLE (METROGEL) 0.75 % vaginal gel; Insert 1 Application into the vagina daily at bedtime for 5 days    Other orders  -     Aqueous Vitamin D 10 MCG/ML LIQD      Results for orders placed or performed in visit on 10/13/23   POCT wet mount   Result Value Ref Range    Yeast, Wet Prep neg     pH 5.5     Whiff Test Pos     Clue Cells Pos     Trich, Wet Prep Neg      Reviewed medications for BV and gave instructions for use  Avoid body washes and liquid soaps to perineal area use only Dove white bar soap    May advance activities as tolerated  May resume sexual activity at your discretion   Perineal hygiene reviewed   Weight bearing exercises minium of 150 mins/weekly advised. Kegel exercises recommended. Reviewed  rest, daily exercise and nutrition recommendations. Continue with PNV. Gardisil vaccines recommended up to age 39  Advised to call with any issues,  all concerns & questions addressed. Follow up for annual exam in 3 months   See AVS for further educational information    Nelia Cortez is a 29 y.o. [id number] presents for routine postpartum visit. She is s/p  @ 40.1 on 23 . Vaginal wall lac Baby Girl, Apgar's 7/9  Prenatal and intrapartum course was complicated by   Patient Active Problem List   Diagnosis    Mild intermittent asthma without complication    Current moderate episode of major depressive disorder without prior episode (HCC)    High-functioning autism spectrum disorder    H/O major depression    Orthostatic hypotension    Raynaud's phenomenon without gangrene    Obesity affecting pregnancy in third trimester    GBS bacteriuria    History of gestational hypertension     She denies abn bleeding, pelvic pain, breast complaints, bowel/bladder dysfunction, depression/anx.  Denies resumption of sexual activity  Baby is thriving. Breast  feeding. EPDS score 13, referral to baby and me placed,  contracts for safety, just having difficulty adjusting to 2 children, Good support. Normal postpartum exam. Edinsona Pamella noted/scant. No CMT or S&S of infection   She expresses no interest in starting contraception.  is having Vasectomy Nov 7th . Discussed risks of short interval pregnancies.      Gardasil: Completed      Past Medical History:   Diagnosis Date    Asthma     childhood asthma  inhaler prn    Autism spectrum disorder     Depression     diag with depression/anxiety age 27 -no meds now    Migraine     diag 2 yrs ago- takes extra-strength tylenol    Termination of pregnancy (fetus)     Urinary tract infection     hx of  last episode 2018    Varicella     received vaccines     Past Surgical History:   Procedure Laterality Date    NO PAST SURGERIES      WISDOM TOOTH EXTRACTION  age 23       Immunization History   Administered Date(s) Administered    H1N1, All Formulations 11/20/2009, 11/20/2009    HPV Quadrivalent 10/12/2011, 06/08/2012    INFLUENZA 10/06/2009, 10/12/2011, 08/27/2014, 10/28/2016    Influenza, injectable, quadrivalent, preservative free 0.5 mL 01/11/2019    Pneumococcal Polysaccharide PPV23 08/27/2014    Tdap 06/08/2012, 05/03/2019, 06/19/2023    Tuberculin Skin Test-PPD Intradermal 11/20/2013       Family History   Problem Relation Age of Onset    Asthma Mother     Bipolar disorder Mother     Post-traumatic stress disorder Mother     Diabetes Mother     Anxiety disorder Father     Depression Sister     Drug abuse Sister     No Known Problems Daughter     Cancer Maternal Grandfather     No Known Problems Paternal Grandmother     No Known Problems Paternal Grandfather     Depression Maternal Uncle     Anxiety disorder Maternal Uncle     Diabetes Paternal Aunt     Breast cancer Family     Colon cancer Neg Hx     Ovarian cancer Neg Hx     Uterine cancer Neg Hx     Cervical cancer Neg Hx Social History     Tobacco Use    Smoking status: Former     Packs/day: 0.50     Years: 10.00     Total pack years: 5.00     Types: Cigarettes     Quit date: 10/1/2018     Years since quittin.0    Smokeless tobacco: Never    Tobacco comments:     smokes 1 cigarette per dAY!    Vaping Use    Vaping Use: Never used   Substance Use Topics    Alcohol use: Not Currently     Comment: occasional-none since pregnancy    Drug use: Not Currently     Types: Marijuana     Comment: Lst used 2018         Current Outpatient Medications:     Aqueous Vitamin D 10 MCG/ML LIQD, , Disp: , Rfl:     metroNIDAZOLE (METROGEL) 0.75 % vaginal gel, Insert 1 Application into the vagina daily at bedtime for 5 days, Disp: 5 g, Rfl: 0    acetaminophen (TYLENOL) 325 mg tablet, Take 2 tablets (650 mg total) by mouth every 4 (four) hours as needed for mild pain, Disp: 30 tablet, Rfl: 0    Prenatal Vit w/Yh-Csepibnyz-IN (PNV PO), Take by mouth, Disp: , Rfl:     Patient Active Problem List    Diagnosis Date Noted    History of gestational hypertension 2023    GBS bacteriuria 2023    Obesity affecting pregnancy in third trimester 2023    Orthostatic hypotension 2022    Raynaud's phenomenon without gangrene 2022    H/O major depression 2019    High-functioning autism spectrum disorder 08/10/2018    Current moderate episode of major depressive disorder without prior episode ([address]) 2018    Mild intermittent asthma without complication      No Known Allergies    OB History    Para Term  AB Living   3 2 2 0 1 2   SAB IAB Ectopic Multiple Live Births   0 0 0 0 2      # Outcome Date GA Lbr Anibal/2nd Weight Sex Delivery Anes PTL Lv   3 Term 23 40w1d / 00:17 3690 g (8 lb 2.2 oz) F Vag-Spont EPI N JUDY   2 Term 19 41w6d  3572 g (7 lb 14 oz) F Vag-Spont EPI N JUDY   1 AB                Vitals:    10/13/23 1444   BP: 126/76   BP Location: Right arm   Patient Position: Sitting   Cuff Size: Large   Weight: 107 kg (236 lb)   Height: 5' 9" (1.753 m)     Body mass index is 34.85 kg/m². Review of Systems     Constitutional: Negative for chills, fatigue, fever, headaches, visual disturbances, and unexpected weight change. Respiratory: Negative for cough, & shortness of breath. Cardiovascular: Negative for chest pain. .    Gastrointestinal: Negative for Abd pain, nausea & vomiting, constipation and diarrhea. Genitourinary: Negative for difficulty urinating, dysuria, hematuria, dyspareunia, unusual vaginal bleeding or discharge  Skin: Negative skin changes    Physical Exam     Constitutional: Alert & Oriented x3, well-developed and well-nourished. No distress. HENT: Atraumatic, Normocephalic, Conjunctivae clear  Neck: Normal range of motion. Pulmonary: Effort normal.   Abdominal: Soft. No tenderness or masses  Musculoskeletal: Normal ROM  Skin: Warm & Dry  Psychological: Normal mood, thought content, behavior & judgement     Breasts: Patient Reported   Right: tissue soft without  tenderness, skin changes  No areas of erythema or pain. Left:  tissue soft without  tenderness, skin changes  No areas of erythema or pain. Pelvic exam was performed with patient supine, lithotomy position. Labia: Negative rash, tenderness, lesion or injury on the right labia. Negative rash, tenderness, lesion or injury on the left labia. Urethral meatus:  Negative for  tenderness, inflammation or discharge. Uterus: not deviated, enlarged, fixed or tender. Cervix: No CMT, no discharge or friability. Right adnexa: no mass, no tenderness and no fullness. Left adnexa: no mass, no tenderness and no fullness. Vagina: No erythema, tenderness, masses, or foreign body in the vagina. No signs of injury around the vagina. Thin creamy malodorous vaginal discharge   Perineum without lesions, signs of injury, erythema or swelling.   Inguinal Canal:        Right: No inguinal adenopathy or hernia present. Left: No inguinal adenopathy or hernia present.

## 2023-10-13 NOTE — PROGRESS NOTES
Delivery date:2023    Vaginal :       ( 1 AB )    Baby : Female     @  40 weeks 1  days :    Apgars:  [email or web address]   and  9@5    Laceration : Right vaginal laceration     Breast feeding:    Interested in discussing Birth control:    Any complication with delivery :    Breast Pain :    Abdominal Pain :    Vaginal Pain:    Vaginal Bleeding :    Any problem with having bowel movements:    Have you had intercourse since you delivered:    Depression score:

## 2023-10-13 NOTE — PATIENT INSTRUCTIONS
Vaginal Delivery   AMBULATORY CARE:   What you need to know about vaginal delivery:  A vaginal delivery occurs when your baby is born through your vagina (birth canal). How to prepare for vaginal delivery: You can ask someone to be with you during labor and delivery. The person can be a spouse, friend, or family member. This person can help make you more comfortable. Arrange to be able to contact the person when labor begins. You may need tests to check for certain infections that can be passed to your baby. You may be given antibiotics to fight a bacterial infection you have or prevent an infection during delivery. Ask if it is okay to eat while you are in labor. Your healthcare provider can give you medicines for pain relief if you choose to have them. You may need medicine to induce (start) your labor if your labor is not moving forward. You may need to move in bed, stand, or walk to help your baby move into position for birth. What will happen during vaginal delivery: You can move into several positions during delivery. You can lie on your back, have your feet up in stirrups, or squat. You may feel pressure on your rectum. This pressure is caused by the movement of your baby's head down the birth canal. You may feel the urge to push. Your healthcare provider will have you push when you feel the urge. He or she will guide your baby out of the birth canal. Forceps or suction may be used to help deliver your baby. You may also need an episiotomy (incision) to make the vaginal opening larger. This will make more room for your baby. At least 1 minute after your baby is born, your healthcare provider will put clamps on the umbilical cord. The cord will then be cut. Your uterus will continue to contract after delivery to push out the placenta. You may be given medicine to prevent heavy bleeding when the placenta is pushed out.  Your healthcare provider may close your episiotomy incision or any tears with stitches. What will happen after vaginal delivery:   Healthcare providers will examine your baby. Your baby may be placed on your chest right away. He or she may also start breastfeeding. Your baby will also be given vitamin K as a shot. Healthcare providers will examine you. Your blood pressure will be checked right after you give birth. Providers will check for vaginal bleeding, and check that your uterus is jhoana. Your temperature and heart rate will be checked regularly. You may be taken to another room to rest with your baby. Call for a healthcare provider if you are holding your baby and start to feel tired. The provider can put him or her in a bassinet near you while you rest or sleep. This will help prevent an accidental drop or fall of your baby. A healthcare provider may massage your abdomen several times to make your uterus firm. This can be uncomfortable. You may have abdominal pains for up to 3 days after you give birth because your uterus is still jhoana. The contractions help release blood from inside your uterus so it shrinks back to its normal size. These contractions may hurt more while you breastfeed your baby. Your healthcare provider may suggest you get out of bed to sit in a chair or walk. Activity can help prevent blood clots. You may be able to go home within 24 to 48 hours after delivery. If you need support at home, ask your healthcare provider about home visits by another healthcare provider. This healthcare provider can help you learn about breastfeeding, bottle feeding, baby care, and perineum care. Call your doctor or obstetrician if:   Your leg feels warm, tender, and painful. It may look swollen and red. You have a fever. You are urinating very little, or not at all. You have heavy vaginal bleeding that fills 1 or more sanitary pads in 1 hour. You feel weak, dizzy, or faint.      Your abdominal or perineal pain does not go away, or gets worse. You feel depressed. You have questions or concerns about your condition or care. Medicines:   NSAIDs , such as ibuprofen, help decrease swelling, pain, and fever. This medicine is available with or without a doctor's order. NSAIDs can cause stomach bleeding or kidney problems in certain people. If you take blood thinner medicine, always ask your healthcare provider if NSAIDs are safe for you. Always read the medicine label and follow directions. Stool softeners  make it easier for you to have a bowel movement. You may need this medicine to treat or prevent constipation. Take your medicine as directed. Contact your healthcare provider if you think your medicine is not helping or if you have side effects. Tell your provider if you are allergic to any medicine. Keep a list of the medicines, vitamins, and herbs you take. Include the amounts, and when and why you take them. Bring the list or the pill bottles to follow-up visits. Carry your medicine list with you in case of an emergency. Activity:  Rest as much as possible. Try to keep all activities short. You may be able to do some exercise soon after you have your baby. Talk with your healthcare provider before you start exercising. If you work outside the home, ask when you can return to your job. Kegel exercises:  Kegel exercises may help your vaginal and rectal muscles heal faster. You can do Kegel exercises by tightening and relaxing the muscles around your vagina. Kegel exercises help make the muscles stronger. Breast care:  When your milk comes in, your breasts may feel full and hard. Ask how to care for your breasts, even if you are not breastfeeding. Constipation:  You may have constipation for a period of time after you have your baby. Do not try to push the bowel movement out if it is too hard. High-fiber foods and extra liquids can help you prevent constipation. Examples of high-fiber foods are fruit and bran.  Prune juice and water are good liquids to drink. You may also be told to take over-the-counter fiber and stool softener medicines. Take these items as directed. Ask how to prevent or treat hemorrhoids. Perineum care: Your perineum is the area between your vagina and anus. Keep the area clean and dry. This will help it heal and prevent infection. Wash the area gently with soap and water when you bathe or shower. Rinse your perineum with warm water after you urinate or have a bowel movement. A warm sitz bath can help decrease pain. To take a sitz bath, fill a bathtub with 4 to 6 inches of warm water. You may also use a sitz bath pan that fits inside the toilet. Sit in the sitz bath for 20 minutes. Do this 2 to 3 times a day, or as directed. The warm water can help decrease pain and swelling. Vaginal discharge: You will have vaginal discharge, called lochia, after your delivery. The lochia is red or dark brown with clots for 1 to 3 days after the birth. The amount will decrease and turn pale pink or brown for 3 to 10 days. It will turn white or yellow on the 10th or 14th day. Use a sanitary pad instead of a tampon to prevent a vaginal infection. You will have lochia for up to 3 weeks after your baby is born. Monthly periods: Your period may start again within 7 to 9 weeks after your baby is born. If you are breastfeeding, it may take longer for your period to start again. You can still get pregnant again even though you do not have your monthly period. Talk with your healthcare provider about a birth control method if you do not want to get pregnant. Mood changes: Many new mothers have some kind of mood changes after delivery. Some of these changes occur because of lack of sleep, hormone changes, and caring for a new baby. Some mood changes can be more serious, such as postpartum depression. Talk with your healthcare provider if you feel unable to care for yourself or your baby.   Sexual activity:  Do not have sex until your healthcare provider says it is okay. You may notice you have a decreased desire for sex, or sex may be painful. You may need to use a vaginal lubricant (gel) to help make sex more comfortable. Follow up with your doctor or obstetrician as directed:  Most women need to return 6 weeks after a vaginal delivery. Ask how to care for any wounds or stitches. Write down your questions so you remember to ask them during your visits. ©  Henrik Callaway  Information is for End User's use only and may not be sold, redistributed or otherwise used for commercial purposes. The above information is an  only. It is not intended as medical advice for individual conditions or treatments. Talk to your doctor, nurse or pharmacist before following any medical regimen to see if it is safe and effective for you. Postpartum Bleeding   AMBULATORY CARE:   What you need to know about postpartum bleeding:  Postpartum bleeding is vaginal bleeding after childbirth. This bleeding is normal, whether your baby was born vaginally or by . It contains blood and the tissue that lined the inside of your uterus when you were pregnant. What to expect with postpartum bleeding:  Postpartum bleeding usually lasts at least 10 days, and may last longer than 6 weeks. Your bleeding may range from light (barely staining a pad) to heavy (soaking a pad in 1 hour). Usually, you have heavier bleeding right after childbirth, which slows over the next few weeks until it stops. The bleeding is red or dark brown with clots for the first 1 to 3 days. It then turns pink for several days, and then becomes a white or yellow discharge until it ends. Call your local emergency number (911 in the [address]) if:   You are suddenly short of breath and feel lightheaded. You have sudden chest pain.     You are breathing faster than normal.    Your heart is beating faster than normal.    Call your doctor or obstetrician if:   Your bleeding increases, or you have heavy bleeding that soaks a pad in 1 hour for 2 hours in a row. You have a fever. You pass large blood clots. You feel dizzy. You have a low back ache, abdominal pain or tenderness, or loss of appetite. You are urinating less than usual, or not at all. You have questions or concerns about your condition or care. What to expect with postpartum bleeding:  Postpartum bleeding usually lasts at least 10 days, and may last longer than 6 weeks. Your bleeding may range from light (barely staining a pad) to heavy (soaking a pad in 1 hour). Usually, you have heavier bleeding right after childbirth, which slows over the next few weeks until it stops. The bleeding is red or dark brown with clots for the first 1 to 3 days. It then turns pink for several days, and then becomes a white or yellow discharge until it ends. Follow up with your obstetrician as directed:  Do not have sex until your obstetrician says it is okay. Write down your questions so you remember to ask them during your visits. © Copyright [facility] 2023 Information is for End User's use only and may not be sold, redistributed or otherwise used for commercial purposes. The above information is an  only. It is not intended as medical advice for individual conditions or treatments. Talk to your doctor, nurse or pharmacist before following any medical regimen to see if it is safe and effective for you. Postpartum Depression   AMBULATORY CARE:   Postpartum depression (PPD)  is a mood disorder that causes feelings of sadness or hopelessness that do not go away. PPD occurs after your baby is born. Your symptoms may last up to 12 months after delivery. Your symptoms may become severe and affect your daily activities and relationships.   Common signs and symptoms:   Feeling sad, anxious, tearful, discouraged, hopeless, or alone    Thoughts that you are not a good mother    Trouble completing daily tasks, concentrating, or remembering things    Lack of appetite    Lack of interest in your baby    Feeling restless, irritable, or withdrawn    An overwhelmed feeling with your new baby and a belief that it will not get better    Feeling unimportant or guilty most of the time    Trouble sleeping, even after the baby is asleep    Call your local emergency number (911 in the [address]) if:   You think about hurting yourself or your baby. Call your doctor if:   Your symptoms get worse or do not get better with treatment. Your depression keeps you from doing your regular daily activities. You have new symptoms since your last visit. You have questions or concerns about your condition or care. Treatment  may include any of the following:  Talk therapy  is used to help you cope with your feelings. Talk therapy can be with a therapist, counselor, or psychiatrist. Sessions may be done alone or with family. Antidepressant medicine  may be given to decrease or manage symptoms. You may need to take this medicine for several weeks before they start working. Tell your provider about any side effects or problems you have with your medicine. The type or amount of medicine may need to be changed. Self-care: It may take a few weeks to feel better. Be patient with yourself. The following can help you manage your symptoms:  Rest as needed. Take a nap or rest while your baby sleeps. Ask someone to watch your baby while you nap. Get emotional support. Share your feelings with your partner, a friend, or another mother. Ask your partner, friends, or family to help with cooking or cleaning. Do only what is needed and let other things wait until later. Exercise when you can. Even 5 or 10 minutes of exercise can help improve your mood. Walk around the block or do some stretching exercises. Eat a variety of healthy foods.   Healthy foods include fruits, vegetables, whole-grain breads, low-fat dairy products, beans, lean meats, and fish. Do not skip meals. Tell your healthcare provider if you have no appetite or cannot eat. Your provider can help you create a healthy meal plan. Care for yourself. Shower and dress yourself each day. Do things you enjoy. Celebrate small achievements, even if it is only 1 thing a day. Try to get out of the house for a short time each day. Meet a friend for coffee. Follow up with your doctor as directed: Your healthcare provider will monitor your progress at follow-up visits. Your provider will also monitor your medicine if you take antidepressants and ask if the medicine is helping. Tell your provider about any side effects or problems you have with your medicine. The type or amount of medicine may need to be changed. Write down your questions so you remember to ask them during your visits. © Copyright Isaiah Abts 2023 Information is for End User's use only and may not be sold, redistributed or otherwise used for commercial purposes. The above information is an  only. It is not intended as medical advice for individual conditions or treatments. Talk to your doctor, nurse or pharmacist before following any medical regimen to see if it is safe and effective for you. Breastfeeding and Breast Engorgement   AMBULATORY CARE:   What you need to know about breast engorgement:  Breast engorgement develops when too much milk builds up in your breast. It is normal for your breasts to feel swollen, heavy, and tender when your milk comes in. This is called breast fullness. When your breast starts to feel painful and hard, the fullness has developed into engorgement. Breast engorgement usually happens 3 to 5 days after you give birth. Engorgement can happen if you are not breastfeeding or expressing milk often, or produce a lot of milk. Your baby may have a hard time latching on (attaching) to your breast to feed.  Without treatment, engorgement can lead to plugged milk ducts or a breast infection called mastitis. Common symptoms include the following: A swollen, tender breast    A breast that feels hard to the touch or looks tight or shiny    Warm, red, or throbbing breast    Flat nipple    A low fever    Seek care immediately if:   You have a fever with chills or body aches. You have pain and swelling in one or both breasts that keeps you from breastfeeding. Contact your healthcare provider if:   You have a tender breast lump that grows slowly and usually forms on one side of your breast.    You have a small, white bump on your nipple. Your symptoms do not get better within 24 hours. You have questions or concerns about your condition or care. Manage your symptoms:   Breastfeed or pump every 2 or 3 hours. Frequent breastfeeding helps decrease engorgement discomfort. Express or pump milk from your breasts before you breastfeed. This will help soften your breast and your nipple, and allow your baby to latch on better. Massage your breast.  Breast massage helps empty your engorged breast and decrease pain. Gently massage your breast before and during breastfeeding to help increase your milk flow. Gently stroke your breast, starting from the outer areas and working your way toward the nipple. Breast massage may also help prevent breast engorgement if done in the first few days after you give birth. Apply a cool compress in between feedings. The cold may help decrease swelling and pain in your engorged breast. Wet a washcloth in cold water, wring it out, and place it on your breast. Ask how long and how often to use a cool compress. Wear a supportive bra. The bra should fit well but not be too tight. Prevent breast engorgement:   Help your baby get a good latch. Hold the nape of his or her neck to help him or her latch onto your breast. Touch his or her top lip with your nipple and wait for him or her to open his or her mouth wide.  Your baby's lower lip and chin should touch the areola (dark area around the nipple) first. Help him or her get as much of the areola in his or her mouth as possible. You should feel as if your baby will not separate from your breast easily. Gently break suction and reposition if your baby is only sucking on the nipple. Talk to a lactation consultant if you need help with your baby's latch. Empty your breasts completely. Take your time when you breastfeed to allow your baby to empty your breast. Try not to switch breasts too early. Express or pump after you breastfeed if your baby is not emptying your breasts when he or she feeds. Apply warmth to your breast before you breastfeed. Put a warm, wet cloth on your breast or take a warm shower. This can help increase your milk flow. Follow up with your doctor as directed:  Write down your questions so you remember to ask them during your visits. For more information:   [employer] [facility] , [address] [place] [address]  Phone: [phone]  Web Address: [email or web address].Phrazit/    [facility] [address]  Phone: [phone]  Phone: [phone]  Web Address: http://www.cain.neo/. org  © Copyright [facility] 2023 Information is for End User's use only and may not be sold, redistributed or otherwise used for commercial purposes. The above information is an  only. It is not intended as medical advice for individual conditions or treatments. Talk to your doctor, nurse or pharmacist before following any medical regimen to see if it is safe and effective for you. Mastitis   AMBULATORY CARE:   Mastitis  is an infection of breast tissue that most often occurs in women who breastfeed. It can happen any time during breastfeeding, but usually occurs within the first 3 months after giving birth. Usually only one breast is affected.   Common signs and symptoms include the following:   Chills and fever    Breast swelling, redness, warmth, and tenderness     Tenderness under your arm    Fatigue and body aches    Contact your healthcare provider if:   Your symptoms do not get better within 2 days. You have a painful lump in your breast.     You have swollen and tender lymph nodes in your armpit on the same side as the affected breast.    You have questions or concerns about your condition or care. Treatment:  You can continue to breastfeed your baby while you are being treated for mastitis. Breastfeeding when you have mastitis may help speed your recovery. You may need any of the following:  Antibiotics  help treat or prevent a bacterial infection. Acetaminophen  decreases pain and fever. It is available without a doctor's order. Ask how much to take and how often to take it. Follow directions. Acetaminophen can cause liver damage if not taken correctly. NSAIDs , such as ibuprofen, help decrease swelling, pain, and fever. This medicine is available with or without a doctor's order. NSAIDs can cause stomach bleeding or kidney problems in certain people. If you take blood thinner medicine, always ask your healthcare provider if NSAIDs are safe for you. Always read the medicine label and follow directions. Incision and drainage  may be needed if the swelling does not go away and an abscess forms. Your healthcare provider will make a small incision in your breast to drain the pus. Manage your symptoms:   Continue to breastfeed from the affected breast.  This will help to prevent an abscess from forming. Breastfeed your baby on the affected side first. Apply a warm, wet cloth on your breast or take a warm shower before you feed your baby. This can help increase your milk flow. If it is painful when you breastfeed from the affected breast, feed your baby from the other breast first. Pump the affected side to completely drain your breast after breastfeeding, if needed.  You may save the pumped milk to feed your baby. Use different positions to breastfeed. Change the position of your baby during feedings. This may help to relieve your discomfort. Apply heat on your breast for 20 to 30 minutes every 2 hours for as many days as directed. Heat helps decrease pain. Apply ice after feedings. Apply ice on your breast for 15 to 20 minutes every hour or as directed. Use an ice pack, or put crushed ice in a plastic bag. Cover it with a towel. Ice helps prevent tissue damage and decreases swelling and pain. Massage your breast.  Gently massage your breast before and during breastfeeding to help drain your milk. Drink liquids as directed. Ask how much liquid to drink each day and which liquids are best for you. Rest as needed. Do not sleep on your stomach until your infection is gone. Prevent mastitis:   Breastfeed every 2 or 3 hours to prevent engorgement. Breast engorgement develops when too much milk builds up in your breast. Take your time when you breastfeed to allow your baby to empty your breast. Try not to switch breasts too early. Express or pump after you breastfeed if your baby is not emptying your breasts when he or she feeds. Prevent sore and cracked nipples. A good latch prevents sore and cracked nipples. If you have sore nipples after breastfeeding, your baby may not be latched on properly. Gently break suction and reposition if your baby is only sucking on the nipple. Talk to a lactation consultant if you need help with your baby's latch. Care for your breasts. Keep your nipples clean and dry between feedings. Check them for cracks, blisters, or other irritated areas. Ask a lactation specialist or your healthcare provider how to treat sore and cracked nipples. Wash your hands before and after you breastfeed your baby or pump your breasts. Wear a comfortable nursing bra that supports your breasts but is not too tight.     Follow up with your doctor as directed: Write down your questions so you remember to ask them during your visits. © Copyright Tiffani Grover 2023 Information is for End User's use only and may not be sold, redistributed or otherwise used for commercial purposes. The above information is an  only. It is not intended as medical advice for individual conditions or treatments. Talk to your doctor, nurse or pharmacist before following any medical regimen to see if it is safe and effective for you. Caring for Your Baby   WHAT YOU NEED TO KNOW:   Care for your baby includes keeping him or her safe, clean, and comfortable. Your baby will cry or make noises to let you know when he or she needs something. You will learn to tell what your baby needs by the way he or she cries. Your baby will move in certain ways when he or she needs something, such as sucking on a fist when hungry. DISCHARGE INSTRUCTIONS:   Call your local emergency number (911 in the [address]) if:   You feel like hurting your baby. Call your baby's pediatrician if:   Your baby's abdomen is hard and swollen, even when he or she is calm and resting. You feel depressed and cannot take care of your baby. Your baby's lips or mouth are blue and he or she is breathing faster than usual.    Your baby's armpit temperature is higher than 99°F (37.2°C). Your baby's eyes are red, swollen, or draining yellow pus. Your baby coughs often during the day, or chokes during each feeding. Your baby does not want to eat. Your baby cries more than usual and you cannot calm him or her down. Your baby's skin turns yellow or he or she has a rash. You have questions or concerns about caring for your baby. What to feed your baby:   Breast milk is the only food your baby needs for the first 6 months of life. If possible, only breastfeed (no formula) him or her for the first 6 months.  Breastfeeding is recommended for at least the first year of your baby's life, even when he or she starts eating food. You may pump your breasts and feed breast milk from a bottle. You may feed your baby formula from a bottle if breastfeeding is not possible. Talk to your baby's pediatrician about the best formula for your baby. He or she can help you choose one that contains iron. Do not add cereal to the milk or formula. Your baby may get too many calories during a feeding. You can make more if your baby is still hungry after he or she finishes a bottle. How much to feed your baby: Your baby may want different amounts each day. The amount of formula or breast milk your baby drinks may change with each feeding and each day. The amount your baby drinks depends on his or her weight, how fast he or she is growing, and how hungry he or she is. Your baby may want to drink a lot one day and not want to drink much the next. Do not overfeed your baby. Overfeeding means your baby gets too many calories during a feeding. This may cause him or her to gain weight too fast. Your baby may also continue to overeat later in life. Look for signs that your baby is done feeding. Your baby may look around instead of watching you. He or she may chew on the nipple of the bottle rather than suck on it. He or she may also cry and try to wriggle away from the bottle or out of the high chair. Feed your baby each time he or she is hungry:      Babies up to 2 months old  will drink about 2 to 4 ounces at each feeding. He or she will probably want to drink every 3 to 4 hours. Wake your baby to feed him or her if he or she sleeps longer than 4 to 5 hours. Babies 2 to 10 months old  should drink 4 to 5 bottles each day. He or she will drink 4 to 6 ounces at each feeding. When your baby is 2 to 1 months old, he or she may begin to sleep through the night. When this happens, you may stop waking up to give your baby formula or breast milk in the night.  If you are giving your baby breast milk, you may still need to wake up to pump your breasts. Store the milk for your baby to drink at a later time. Babies 6 to 13 months old  should drink 3 to 5 bottles every day. He or she may drink up to 8 ounces at each feeding. You may increase the time between feedings if your baby is not hungry. You may also start to feed your baby foods at 6 months. Ask your child's pediatrician for more information about the right foods to feed your baby. How to help your baby latch on correctly for breastfeeding:  Help your baby move his or her head to reach your breast. Hold the nape of his or her neck to help him or her latch onto your breast. Touch his or her top lip with your nipple and wait for him or her to open his or her mouth wide. Your baby's lower lip and chin should touch the areola (dark area around the nipple) first. Help him or her get as much of the areola in his or her mouth as possible. You should feel as if your baby will not separate from your breast easily. A correct latch helps your baby get the right amount of milk at each feeding. Allow your baby to breastfeed for as long as he or she is able. Signs of correct latch-on:   You can hear your baby swallow. Your baby is relaxed and takes slow, deep mouthfuls. Your breast or nipple does not hurt during breastfeeding. Your baby is able to suckle milk right away after he or she latches on. Your nipple is the same shape when your baby is done breastfeeding. Your breast is smooth, with no wrinkles or dimples where your baby is latched on. Feed your baby safely:   Hold your baby upright to feed him or her. Do not prop your baby's bottle. Your baby could choke while you are not watching, especially in a moving vehicle. Do not use a microwave to heat your baby's bottle. The milk or formula will not heat evenly and will have spots that are very hot. Your baby's face or mouth could be burned.  You can warm the milk or formula quickly by placing the bottle in a pot of warm water for a few minutes. How to burp your baby:  Annie Cross your baby when you switch breasts or after every 2 to 3 ounces from a bottle. Burp him or her again when he or she is finished eating. Your baby may spit up when he or she burps. This is normal. Hold your baby in any of the following positions to help him or her burp:  Hold your baby against your chest or shoulder. Support his or her bottom with one hand. Use your other hand to pat or rub his or her back gently. Sit your baby upright on your lap. Use one hand to support his or her chest and head. Use the other hand to pat or rub his or her back. Place your baby across your lap. He or she should face down with his or her head, chest, and belly resting on your lap. Hold him or her securely with one hand and use your other hand to rub or pat his or her back. How to change your baby's diaper:  Never leave your baby alone when you change his or her diaper. If you need to leave the room, put the diaper back on and take your baby with you. Wash your hands before and after you change your baby's diaper. Put a blanket or changing pad on a safe surface. Anahi Right your baby down on the blanket or pad. Remove the dirty diaper and clean your baby's bottom. If your baby had a bowel movement, use the diaper to wipe off most of the bowel movement. Clean your baby's bottom with a wet washcloth or diaper wipe. Do not use diaper wipes if your baby has a rash or circumcision that has not yet healed. Gently lift both legs and wash the buttocks. Always wipe from front to back. Clean under all skin folds and between creases. Apply ointment or petroleum jelly as directed if your baby has a rash. Put on a clean diaper. Lift both your baby's legs and slide the clean diaper beneath his or her buttocks. Gently direct your baby boy's penis down as the diaper is put on. Fold the diaper down if your baby's umbilical cord has not fallen off.     How to care for your baby's skin:  Sponge bathe your baby with warm water and a cleanser made for a baby's skin. Do not use baby oil, creams, or ointments. These may irritate your baby's skin or make skin problems worse. Ask for more information on sponge bathing your baby. Fontanelles  (soft spots) on your baby's head are usually flat. They may bulge when your baby cries or strains. It is normal to see and feel a pulse beating under a soft spot. It is okay to touch and wash your baby's soft spots. Skin peeling  is common in babies who are born after their due date. Peeling does not mean that your baby's skin is too dry. You do not need to put lotions or oils on your 's skin to stop the peeling or to treat rashes. Bumps, a rash, or acne  may appear about 3 days to 5 weeks after birth. Bumps may be white or yellow. Your baby's cheeks may feel rough and may be covered with a red, oily rash. Do not squeeze or scrub the skin. When your baby is 1 to 2 months old, his or her skin pores will begin to naturally open. When this happens, the skin problems will go away. A lip callus (thickened skin)  may form on your baby's upper lip during the first month. It is caused by sucking and should go away within the first year. This callus does not bother your baby, so you do not need to remove it. How to clean your baby's ears and nose:   Use a wet washcloth or cotton ball  to clean the outer part of your baby's ears. Do not put cotton swabs into your baby's ears. These can hurt his or her ears and push earwax in. Earwax should come out of your baby's ear on its own. Talk to your baby's pediatrician if you think your baby has too much earwax. Use a rubber bulb syringe  to suction your baby's nose if he or she is stuffed up. Point the bulb syringe away from his or her face and squeeze the bulb to create a vacuum. Gently put the tip into one of your baby's nostrils. Close the other nostril with your fingers.  Release the bulb so that it sucks out the mucus. Repeat if necessary. Boil the syringe for 10 minutes after each use. Do not put your fingers or cotton swabs into your baby's nose. How to care for your baby's eyes:  A  baby's eyes usually make just enough tears to keep his or her eyes wet. By 7 to 7 months old, your baby's eyes will develop so they can make more tears. Tears drain into small ducts at the inside corners of each eye. A blocked tear duct is common in newborns. A possible sign of a blocked tear duct is a yellow sticky discharge in one or both of your baby's eyes. Your baby's pediatrician may show you how to massage your baby's tear ducts to unplug them. How to care for your baby's fingernails and toenails:  Your baby's fingernails are soft, and they grow quickly. You may need to trim them with baby nail clippers 1 or 2 times each week. Be careful not to cut too closely to the skin because you may cut the skin and cause bleeding. It may be easier to cut your baby's fingernails when he or she is asleep. Your baby's toenails may grow much slower. They may be soft and deeply set into each toe. You will not need to trim them as often. How to care for your baby's umbilical cord stump:  Your baby's umbilical cord stump will dry and fall off in about 7 to 21 days, leaving a belly button. If your baby's stump gets dirty from urine or bowel movement, wash it off right away with water. Gently pat the stump dry. This will help prevent infection around your baby's cord stump. Fold the front of the diaper down below the cord stump to let it air dry. Do not cover or pull at the cord stump. How to care for your baby boy's circumcision:  Your baby's penis may have a plastic ring that will come off within 8 days. His penis may be covered with gauze and petroleum jelly. Keep your baby's penis as clean as possible. Clean it with warm water only.  Gently blot or squeeze the water from a wet cloth or cotton ball onto the penis. Do not use soap or diaper wipes to clean the circumcision area. This could sting or irritate your baby's penis. Your baby's penis should heal in about 7 to 10 days. What to do when your baby cries:  Your baby may cry because he or she is hungry. He or she may have a wet diaper, or be hot or cold. He or she may cry for no reason you can find. It can be hard to listen to your baby cry and not be able to calm him or her down. Ask for help and take a break if you feel stressed or overwhelmed. Never shake your baby to try to stop his or her crying. This can cause blindness or brain damage. The following may help comfort your baby:  Hold your baby skin to skin and rock him or her, or swaddle him or her in a soft blanket. Gently pat your baby's back or chest. Stroke or rub his or her head. Quietly sing or talk to your baby, or play soft, soothing music. Put your baby in his or her car seat and take him or her for a drive, or go for a stroller ride. Burp your baby to get rid of extra gas. Give your baby a soothing, warm bath. How to keep your baby safe when he or she sleeps:   Always lay your baby on his or her back to sleep. This position can help reduce your baby's risk for sudden infant death syndrome (SIDS). Keep the room at a temperature that is comfortable for an adult. Do not let the room get too hot or cold. Use a crib or bassinet that has firm sides. Do not let your baby sleep on a soft surface such as a waterbed or couch. He or she could suffocate if his or her face gets caught in a soft surface. Use a firm, flat mattress. Cover the mattress with a fitted sheet that is made especially for the type of mattress you are using. Remove all objects, such as toys, pillows, or blankets, from your baby's bed while he or she sleeps. Ask for more information on childproofing. How to keep your baby safe in the car: Always buckle your baby into a child safety seat.   A child safety seat is a padded seat that secures infants and children while they ride in a car. Every child safety seat has age, height, and weight ranges. Keep using the safety seat until your child reaches the maximum of the range. Then he or she is ready for the child safety seat that is the next size up. Only use child safety seats. Do not use a toy chair or prop your child on books or other objects. Make sure you have a safety seat that meets safety standards. Place your child safety seat in the middle of the back seat. The safety seat should not move more than 1 inch in any direction after you secure it. Always follow the instructions provided to help you position the safety seat. The instructions will also guide you on how to secure your child properly. Make sure the child safety seat has a harness and clip. The harness is made of straps that go over your child's shoulders. The straps connect to a buckle that rests over your child's abdomen. These straps keep your child in the seat during an accident. Another strap comes up from the bottom of the seat and connects to the buckle between your child's legs. This strap keeps your child from slipping out of the seat. Slide the clip up and down the shoulder straps to make them tighter or looser. You should be able to slip a finger between your child and the strap. Follow up with your baby's pediatrician as directed:  Write down your questions so you remember to ask them during your visits. © Copyright [employer] 2023 Information is for End User's use only and may not be sold, redistributed or otherwise used for commercial purposes. The above information is an  only. It is not intended as medical advice for individual conditions or treatments. Talk to your doctor, nurse or pharmacist before following any medical regimen to see if it is safe and effective for you.   Self Care After Delivery   AMBULATORY CARE:   The postpartum period  is the period of time from delivery to about 6 weeks. During this time you may experience many physical and emotional changes. It is important to understand what is normal and when you need to call your healthcare provider. It is also important to know how to care for yourself during this time. Call your local emergency number (911 in the [address]) for any of the following: You see or hear things that are not there, or have thoughts of harming yourself or your baby. You soak through 1 pad in 15 minutes, have blurry vision, clammy or pale skin, and feel faint. You faint or lose consciousness. You have trouble breathing. You cough up blood. Your  incision comes apart. Seek care immediately if:   Your heart is beating faster than usual.    You have a bad headache or changes in your vision. Your episiotomy or  incision is red, swollen, bleeding, or draining pus. You have severe abdominal pain. Call your doctor or obstetrician if:   Your leg is painful, red, and larger than usual.    You soak through 1 or more pads in an hour, or pass blood clots larger than a quarter from your vagina. You have a fever. You have new or worsening pain in your abdomen or vagina. You continue to have depression 1 to 2 weeks after you deliver. You have trouble sleeping. You have foul-smelling discharge from your vagina. You have pain or burning when you urinate. You do not have a bowel movement for 3 days or more. You have nausea or are vomiting. You have hard lumps or red streaks over your breasts. You have cracked nipples or bleed from your nipples. You have questions or concerns about your condition or care. Physical changes:   The following are normal changes after you give birth:  Pain in the area between your anus and vagina    Breast pain    Constipation or hemorrhoids    Hot or cold flashes    Vaginal bleeding or discharge    Mild to moderate abdominal cramping    Difficulty controlling bowel movements or urine    Emotional changes:  A drop in hormone levels after you deliver may cause changes in your emotions. You may feel irritable, sad, or anxious. You may cry easily or for no reason. You may also feel depressed. Depression that continues can be a sign of postpartum depression, a condition that can be treated. Treatment may include talk therapy, medicines, or both. Healthcare providers will ask how you are feeling and if you have any depression. These talks can happen during appointments for your medical care and for your baby's care, such as well child visits. Providers can help you find ways to care for yourself and your baby. Talk to your providers about the following:  When emotional changes or depression started, and if it is getting worse over time    Problems you are having with daily activities, sleep, or caring for your baby    If anything makes you feel worse, or makes you feel better    Feeling that you are not bonding with your baby the way you want    Any problems your baby has with sleeping or feeding    Your baby is fussy or cries a lot    Support you have from friends, family, or others    Breast care for breastfeeding mothers: You may have sore breasts for 3 to 6 days after you give birth. This happens as your milk begins to fill your breasts. You may also have sore breasts if you do not breastfeed frequently. Do the following to care for your breasts:  Apply a moist, warm, compress to your breast as directed. This may help soothe your breasts. Make sure the washcloth is not too hot before you apply it to your breast.    Nurse your baby or pump your milk frequently. This may prevent clogged milk ducts. Ask your healthcare provider how often to nurse or pump. Massage your breasts as directed. This may help increase your milk flow. Gently rub your breasts in a circular motion before you breastfeed.  You may need to gently squeeze your breast or nipple to help release milk. You can also use a breast pump to help release milk from your breast.    Wash your breasts with warm water only. Do not put soap on your nipples. Soap may cause your nipples to become dry. Apply lanolin cream to your nipples as directed. Lanolin cream may add moisture to your skin and prevent nipple dryness. Always  wash off lanolin cream with warm water before you breastfeed. Place pads in your bra. Your nipples may leak milk when you are not breastfeeding. You can place pads inside of your bra to help prevent leaking onto your clothing. Ask your healthcare provider where to purchase bra pads. Get breastfeeding support if needed. Healthcare providers can answer questions about breastfeeding and provide you with support. Ask your healthcare provider who you can contact if you need breastfeeding support. Breast care for non-breastfeeding mothers:  Milk will fill your breasts even if you bottle feed your baby. Do the following to help stop your milk from filling your breasts and causing pain:  Wear a bra with support at all times. A sports bra or a tight-fitting bra will help stop your milk from coming in. Apply ice on each breast for 15 to 20 minutes every hour or as directed. Use an ice pack, or put crushed ice in a plastic bag. Cover it with a towel before you apply it to your breast. Ice helps your milk ducts shrink. Keep your breasts away from warm water. Warm water will make it easier for milk to fill your breasts. Stand with your breasts away from warm water in the shower. Limit how much you touch your breasts. This will prevent them from filling with milk. Perineum care: Your perineum is the area between your rectum and vagina. It is normal to have swelling and pain in this area after you give birth. If you had an episiotomy, your healthcare provider may give you special instructions. Clean your perineum after you use the bathroom.   This may prevent infection and help with healing. Use a spray bottle with warm water to clean your perineum. You may also gently spray warm water against your perineum when you urinate. Always wipe front to back. Take a sitz bath as directed. A sitz bath may help relieve swelling and pain. Fill your bath tub or bucket with water up to your hips and sit in the water. Use cold water for 2 days after you deliver. Then use warm water. Ask your healthcare provider for more information about a sitz bath. Apply ice packs for the first 24 hours or as directed. Use a plastic glove filled with ice or buy an ice pack. Wrap the ice pack or plastic glove in a small towel or wash cloth. Place the ice pack on your perineum for 20 minutes at a time. Sit on a donut-shaped pillow. This may relieve pressure on your perineum when you sit. Use wipes that contain medicine or take pills as directed. Your healthcare provider may tell you to use witch hazel pads. You can place witch hazel pads in the refrigerator before you apply them to your perineum. Your provider may also tell you to take NSAIDs. Ask him or her how often to take pills or use the wipes. Do not go swimming or take tub baths for 4 to 6 weeks or as directed. This will help prevent an infection in your vagina or uterus. Bowel and bladder care: It may take 3 to 5 days to have a bowel movement after you deliver your baby. You can do the following to prevent or manage constipation, and get control of your bowel or bladder:  Take stool softeners as directed. A stool softener is medicine that will make your bowel movements softer. This may prevent or relieve constipation. A stool softener may also make bowel movements less painful. Drink plenty of liquids. Ask how much liquid to drink each day and which liquids are best for you. Liquids may help prevent constipation. Eat foods high in fiber.   Examples include fruits, vegetables, grains, beans, and lentils. Ask your healthcare provider how much fiber you need each day. Fiber may prevent constipation. Do Kegel exercises as directed. Kegel exercises will help strengthen the muscles that control bowel movements and urination. Ask your healthcare provider for more information on Kegel exercises. Apply cold compresses or medicine to hemorrhoids as directed. This may relieve swelling and pain. Your healthcare provider may tell you to apply ice or wipes that contain medicine to your hemorrhoids. He or she may also tell you to use a sitz bath. Ask your provider for more information on how to manage hemorrhoids. Nutrition:  Good nutrition is important in the postpartum period. It will help you return to a healthy weight, increase your energy levels, and prevent constipation. It will also help you get enough nutrients and calories if you are going to breastfeed your baby. Eat a variety of healthy foods. Healthy foods include fruits, vegetables, whole-grain breads, low-fat dairy products, beans, lean meats, and fish. You may need 500 to 700 extra calories each day if you breastfeed your baby. You may also need extra protein. Limit foods with added sugar and high amounts of fat. These foods are high in calories and low in healthy nutrients. Read food labels so you know how much sugar and fat is in the food you want to eat. Drink 8 to 10 glasses of water per day. Water will help you make plenty of milk for your baby. It will also help prevent constipation. Drink a glass of water every time you breastfeed your baby. Take vitamins as directed. Ask your healthcare provider what vitamins you need. Limit caffeine and alcohol if you are breastfeeding. Caffeine and alcohol can get into your breast milk. Caffeine and alcohol can make your baby fussy. They can also interfere with your baby's sleep. Ask your healthcare provider if you can drink alcohol or caffeine. Rest and sleep:   You may feel very tired in the postpartum period. Enough sleep will help you heal and give you energy to care for your baby. The following may help you get sleep and rest:  Nap when your baby naps. Your baby may nap several times during the day. Get rest during this time. Limit visitors. Many people may want to see you and your baby. Ask friends or family to visit on different days. This will give you time to rest.    Do not plan too much for one day. Put off household chores so that you have time to rest. Gradually do more each day. Ask for help from family, friends, or neighbors. Ask them to help you with laundry, cleaning, or errands. Also ask someone to watch the baby while you take a nap or relax. Ask your partner to help with the care of your baby. Pump some of your breast milk so your partner can feed your baby during the night. Exercise after delivery:  Wait until your healthcare provider says it is okay to exercise. Exercise can help you lose weight, increase your energy levels, and manage your mood. It can also prevent constipation and blood clots. Start with gentle exercises such as walking. Do more as you have more energy. You may need to avoid abdominal exercises for 1 to 2 weeks after you deliver. Talk to your healthcare provider about an exercise plan that is right for you. Sexual activity after delivery:   Do not have sex until your healthcare provider says it is okay. You may need to wait 4 to 6 weeks before you have sex. This may prevent infection and allow time to heal.    Your menstrual cycle may begin as soon as 3 weeks after you deliver. Your period may be delayed if you breastfeed your baby. You can become pregnant before you get your first postpartum period. Talk to your healthcare provider about birth control that is right for you. Some types of birth control are not safe during breastfeeding. For support and more information:  Join a support group for new mothers.  Ask for help from family and friends with chores, errands, and care of your baby. Office of Carroll Zambrano,  Department of Health and Human Services  [address] [facility] , [address] [facility]  [address] [facility] , [address] [facility]  Phone: [phone]  Web Address: www.womenshealth.gov  [facility] [address] [facility] , [address]  [address] , [address]  Web Address: [employer]. org/pregnancy/[email or web address]-care. aspx  Follow up with your doctor or obstetrician as directed: You will need to follow up within 2 to 6 weeks of delivery. Write down your questions so you remember to ask them at your visits. © Copyright [facility] [employer] 2023 Information is for End User's use only and may not be sold, redistributed or otherwise used for commercial purposes. The above information is an  only. It is not intended as medical advice for individual conditions or treatments. Talk to your doctor, nurse or pharmacist before following any medical regimen to see if it is safe and effective for you. Kegel Exercises for Women   AMBULATORY CARE:   Kegel exercises  help strengthen your pelvic muscles. Pelvic muscles hold your pelvic organs, such as your bladder and uterus, in place. Kegel exercises help prevent or control certain conditions, such as urine incontinence (leakage) or uterine prolapse. Call your doctor or physical therapist if:   You cannot feel your muscles tighten or relax. You continue to leak urine. You have questions or concerns about your condition or care. Use the correct muscles:  Pelvic muscles are the muscles you use to control urine flow. To target these muscles, stop and start the flow of urine several times. This will help you become familiar with how it feels to tighten and relax these muscles. How to do Kegel exercises:   Get into a comfortable position.   You may lie down, stand up, or sit down to do these exercises. When you first try to do these exercises, it may be easier if you lie down. Tighten or squeeze your pelvic muscles slowly. It may feel like you are trying to hold back urine or gas. Hold this position for 3 seconds. Relax for 3 seconds. Repeat this cycle 10 times. Do not hold your breath when you do Kegel exercises. Keep your stomach, back, and leg muscles relaxed. Do 10 sets of Kegel exercises, at least 3 times a day. When you know how to do Kegel exercises, use different positions. This will help to strengthen your pelvic muscles as much as possible. You can do these exercises while you lie on the floor, watch TV, or while you stand. Tighten your pelvic muscles before you sneeze, cough, or lift to prevent urine leakage. You may notice improved bladder control within about 6 weeks. Follow up with your doctor or physical therapist as directed:  Write down your questions so you remember to ask them during your visits. © Copyright Christina Wells 2023 Information is for End User's use only and may not be sold, redistributed or otherwise used for commercial purposes. The above information is an  only. It is not intended as medical advice for individual conditions or treatments. Talk to your doctor, nurse or pharmacist before following any medical regimen to see if it is safe and effective for you.

## 2023-11-02 ENCOUNTER — TELEPHONE (OUTPATIENT)
Dept: PSYCHIATRY | Facility: CLINIC | Age: 33
End: 2023-11-02

## 2023-11-02 NOTE — TELEPHONE ENCOUNTER
Contacted patient in regards to Routine Referral in attempts to verify patient's needs of services and add patient to proper wait list. spoke with patient whom stated they do not require services at this time. Writer notified patient referral will be closed and if views change later they may contact the office at that time.

## 2023-11-21 ENCOUNTER — OFFICE VISIT (OUTPATIENT)
Dept: FAMILY MEDICINE CLINIC | Facility: CLINIC | Age: 33
End: 2023-11-21
Payer: COMMERCIAL

## 2023-11-21 VITALS
HEIGHT: 69 IN | OXYGEN SATURATION: 98 % | BODY MASS INDEX: 35.1 KG/M2 | WEIGHT: 237 LBS | TEMPERATURE: 98 F | SYSTOLIC BLOOD PRESSURE: 132 MMHG | HEART RATE: 76 BPM | DIASTOLIC BLOOD PRESSURE: 84 MMHG
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
## 2023-11-21 DIAGNOSIS — S39.012A BACK STRAIN, INITIAL ENCOUNTER: ICD-10-CM

## 2023-11-21 PROCEDURE — 99213 OFFICE O/P EST LOW 20 MIN: CPT | Performed by: FAMILY MEDICINE

## 2023-11-21 NOTE — PROGRESS NOTES
Name: Abena Ruelas      : 1990      MRN: [id number]  Encounter Provider: Shahzad Avilez MD  Encounter Date: 2023   Encounter department: [address] [place] [address]     1. Dog bite, initial encounter  Assessment & Plan:  No further treatment for dog bite. This appears to be healing well without signs of infection. 2. Back strain, initial encounter  Assessment & Plan:  Tylenol and intermittent heat. Patient is nursing her baby. Subjective      Patient comes in after being bitten by a dog 3 days ago. She complains of sore back above the area where she was bitten. Review of Systems   Constitutional: Negative. Cardiovascular: Negative. Gastrointestinal:  Positive for abdominal distention. Musculoskeletal:  Positive for back pain. Current Outpatient Medications on File Prior to Visit   Medication Sig    acetaminophen (TYLENOL) 325 mg tablet Take 2 tablets (650 mg total) by mouth every 4 (four) hours as needed for mild pain (Patient not taking: Reported on 2023)    Aqueous Vitamin D 10 MCG/ML LIQD  (Patient not taking: Reported on 2023)    Prenatal Vit w/Fn-Jqsqfrmao-VV (PNV PO) Take by mouth (Patient not taking: Reported on 2023)       Objective     /84 (BP Location: Left arm, Patient Position: Sitting)   Pulse 76   Temp 98 °F (36.7 °C)   Ht 5' 9" (1.753 m)   Wt 108 kg (237 lb)   SpO2 98%   BMI 35.00 kg/m²     Physical Exam  Constitutional:       Appearance: Normal appearance. HENT:      Head: Normocephalic and atraumatic. Musculoskeletal:      Comments: Ecchymotic bite elena left buttock. Tender paraspinal muscles left thoracic and lumbar spines   Neurological:      Mental Status: She is alert.    Psychiatric:         Mood and Affect: Mood normal.         Behavior: Behavior normal.       Shahzad Avilez MD

## 2023-11-27 ENCOUNTER — OFFICE VISIT (OUTPATIENT)
Dept: URGENT CARE | Facility: CLINIC | Age: 33
End: 2023-11-27
Payer: COMMERCIAL

## 2023-11-27 VITALS
DIASTOLIC BLOOD PRESSURE: 83 MMHG | HEART RATE: 70 BPM | SYSTOLIC BLOOD PRESSURE: 134 MMHG | RESPIRATION RATE: 16 BRPM | TEMPERATURE: 97.2 F | OXYGEN SATURATION: 95 %

## 2023-11-27 DIAGNOSIS — J06.9 URI WITH COUGH AND CONGESTION: Primary | ICD-10-CM

## 2023-11-27 PROCEDURE — 99213 OFFICE O/P EST LOW 20 MIN: CPT | Performed by: ORTHOPAEDIC SURGERY

## 2023-11-27 NOTE — PROGRESS NOTES
[facility]        NAME: Desiree Guan is a 29 y.o. female  : 1990    MRN: [id number]  DATE: 2023  TIME: 12:24 PM    Assessment and Plan   URI with cough and congestion [J06.9]  1. URI with cough and congestion              Patient Instructions     Most upper respiratory infections are viral and resolve on their own within 10-14 days. Antibiotics are not indicated for the viral infection, and are only prescribed if there is evidence for a bacterial infection. Sometimes an upper respiratory infection may lead to secondary bacterial infection, such as bacterial sinusitis, in which case antibiotics would be indicated at that time. If viral symptoms continue beyond 10-14 days or if you experience ongoing fevers, productive cough with green, brown, bloody phlegm production, you may have developed a bacterial infection. For the uncomplicated viral upper respiratory infection conservative management includes:    Ibuprofen/Motrin 600mg every 6 hours for fever, headaches, body aches   Ibuprofen is an NSAID. Please stop medication if you experience stomach/abdominal pain and report to your primary care provider. Ask your primary care provider before you take NSAIDs if you are on any blood thinners, or if you have a history of heart disease, kidney disease, gastric bypass surgery, GI bleed, or poorly controlled high blood pressure. May use acetaminophen/Tylenol as directed on the bottle between doses of ibuprofen. Do not exceed 4,000mg of Tylenol a day. Cough:  Guaifenesin/Mucinex as directed on the bottle for congestion and mucous-y cough.    Dextromethorphan/Delsym for dry cough and cough suppression   OTC combination cough and congestion medication such as Dimetapp  If prescribed, take Tessalon Pearles or Bromfed as directed  Cepacol lozenges, "throat coat" tea for sore throat  Vitamin/Minerals:  Vitamin D3 2,000 IU daily  Vitamin C 1000mg twice a day  Some studies suggest that Zinc 12.5-15mg every 2 hours while awake for 5 days may shorten symptom duration by 1-2 days  Other:   Plenty of fluids and rest  Sudafed for sinus pressure (Ask PCP first if history of HTN or cardiac disease)  Nasal sinus rinses such as Neti Pot (please only use distilled/sterile water that can be purchased at your local pharmacy)  Follow up with PCP in 3-5 days    Chief Complaint     Chief Complaint   Patient presents with    Cold Like Symptoms     Pt c/o productive cough, head and chest congestion, hoarse voice that started on saturday         History of Present Illness       29-year-old female presents the urgent care for evaluation of cough, congestion, fatigue. Patient states symptoms began Saturday. Her young daughter is sick with similar symptoms. Patient has not had any fever or chills, no nausea, vomiting, diarrhea. She denies any history of COPD, though notes that she did have asthma many years ago, but has not had any exacerbation in about 4 years. She denies any shortness of breath, chest pain, dizziness, lightheadedness. She has not been taking any medication for her symptoms. She complains that her cough is productive with green mucus. Review of Systems   Review of Systems   Constitutional:  Positive for fatigue. Negative for chills and fever. HENT:  Positive for congestion, sinus pressure and voice change. Negative for ear pain and sore throat. Eyes:  Negative for pain and visual disturbance. Respiratory:  Positive for cough. Negative for shortness of breath. Cardiovascular:  Negative for chest pain and palpitations. Gastrointestinal:  Negative for abdominal pain, diarrhea, nausea and vomiting. Genitourinary:  Negative for dysuria and hematuria. Musculoskeletal:  Negative for arthralgias and back pain. Skin:  Negative for color change and rash. Neurological:  Negative for dizziness, seizures, syncope, weakness and headaches. Psychiatric/Behavioral: Negative.      All other systems reviewed and are negative. Current Medications       Current Outpatient Medications:     acetaminophen (TYLENOL) 325 mg tablet, Take 2 tablets (650 mg total) by mouth every 4 (four) hours as needed for mild pain (Patient not taking: Reported on 11/21/2023), Disp: 30 tablet, Rfl: 0    Aqueous Vitamin D 10 MCG/ML LIQD, , Disp: , Rfl:     Prenatal Vit w/Oy-Jljgvzecs-JF (PNV PO), Take by mouth (Patient not taking: Reported on 11/21/2023), Disp: , Rfl:     Current Allergies     Allergies as of 11/27/2023    (No Known Allergies)            The following portions of the patient's history were reviewed and updated as appropriate: allergies, current medications, past family history, past medical history, past social history, past surgical history and problem list.     Past Medical History:   Diagnosis Date    Asthma     childhood asthma  inhaler prn    Autism spectrum disorder     Depression     diag with depression/anxiety age 27 -no meds now    Migraine     diag 2 yrs ago- takes extra-strength tylenol    Termination of pregnancy (fetus)     Urinary tract infection     hx of  last episode 2018    Varicella     received vaccines       Past Surgical History:   Procedure Laterality Date    NO PAST SURGERIES      WISDOM TOOTH EXTRACTION  age 23       Family History   Problem Relation Age of Onset    Asthma Mother     Bipolar disorder Mother     Post-traumatic stress disorder Mother     Diabetes Mother     Anxiety disorder Father     Depression Sister     Drug abuse Sister     No Known Problems Daughter     Cancer Maternal Grandfather     No Known Problems Paternal Grandmother     No Known Problems Paternal Grandfather     Depression Maternal Uncle     Anxiety disorder Maternal Uncle     Diabetes Paternal Aunt     Breast cancer Family     Colon cancer Neg Hx     Ovarian cancer Neg Hx     Uterine cancer Neg Hx     Cervical cancer Neg Hx          Medications have been verified.         Objective   /83 Pulse 70   Temp (!) 97.2 °F (36.2 °C) (Temporal)   Resp 16   SpO2 95%        Physical Exam     Physical Exam  Nursing note reviewed. Constitutional:       General: She is not in acute distress. Appearance: Normal appearance. She is normal weight. She is not ill-appearing. HENT:      Head: Normocephalic and atraumatic. Right Ear: Tympanic membrane normal.      Left Ear: Tympanic membrane normal.      Nose: Nose normal.      Mouth/Throat:      Pharynx: Oropharynx is clear. Eyes:      Extraocular Movements: Extraocular movements intact. Pupils: Pupils are equal, round, and reactive to light. Cardiovascular:      Rate and Rhythm: Normal rate and regular rhythm. Pulses: Normal pulses. Heart sounds: Normal heart sounds. Pulmonary:      Effort: Pulmonary effort is normal. No respiratory distress. Breath sounds: Normal breath sounds. No wheezing or rhonchi. Abdominal:      Palpations: Abdomen is soft. Musculoskeletal:         General: Normal range of motion. Cervical back: Normal range of motion. Lymphadenopathy:      Cervical: No cervical adenopathy. Skin:     General: Skin is warm and dry. Capillary Refill: Capillary refill takes less than 2 seconds. Neurological:      General: No focal deficit present. Mental Status: She is alert and oriented to person, place, and time.    Psychiatric:         Mood and Affect: Mood normal.         Behavior: Behavior normal.

## 2023-11-27 NOTE — PATIENT INSTRUCTIONS
Most upper respiratory infections are viral and resolve on their own within 10-14 days. Antibiotics are not indicated for the viral infection, and are only prescribed if there is evidence for a bacterial infection. Sometimes an upper respiratory infection may lead to secondary bacterial infection, such as bacterial sinusitis, in which case antibiotics would be indicated at that time. If viral symptoms continue beyond 10-14 days or if you experience ongoing fevers, productive cough with green, brown, bloody phlegm production, you may have developed a bacterial infection. For the uncomplicated viral upper respiratory infection conservative management includes:    Ibuprofen/Motrin 600mg every 6 hours for fever, headaches, body aches   Ibuprofen is an NSAID. Please stop medication if you experience stomach/abdominal pain and report to your primary care provider. Ask your primary care provider before you take NSAIDs if you are on any blood thinners, or if you have a history of heart disease, kidney disease, gastric bypass surgery, GI bleed, or poorly controlled high blood pressure. May use acetaminophen/Tylenol as directed on the bottle between doses of ibuprofen. Do not exceed 4,000mg of Tylenol a day. Cough:  Guaifenesin/Mucinex as directed on the bottle for congestion and mucous-y cough.    Dextromethorphan/Delsym for dry cough and cough suppression   OTC combination cough and congestion medication such as Dimetapp  If prescribed, take Tessalon Pearles or Bromfed as directed  Cepacol lozenges, "throat coat" tea for sore throat  Vitamin/Minerals:  Vitamin D3 2,000 IU daily  Vitamin C 1000mg twice a day  Some studies suggest that Zinc 12.5-15mg every 2 hours while awake for 5 days may shorten symptom duration by 1-2 days  Other:   Plenty of fluids and rest  Sudafed for sinus pressure (Ask PCP first if history of HTN or cardiac disease)  Nasal sinus rinses such as Neti Pot (please only use distilled/sterile water that can be purchased at your local pharmacy)  Follow up with PCP in 3-5 days

## 2024-01-30 NOTE — PROGRESS NOTES
Diagnoses and all orders for this visit:    Encounter for gynecological examination without abnormal finding        Perineal hygiene reviewed   Weight bearing exercises minium of 150 mins/weekly advised.   Kegel exercises recommended daily, see AVS for instructions and recommendations  SBE encouraged, ASCCP guidelines reviewed. Condoms encouraged with all sexual activity to prevent STI's.   Gardisil vaccines recommended up to age 45  Calcium/ Vit D dietary requirements discussed,   Advised to call with any issues,  all concerns & questions addressed.   See provided information in your after visit summary     F/U Annually and PRN      Health Maintenance:    Last PAP: 2023 Negative HPV Negative   Next PAP Due:    Last Mammogram: Not on file  advised age 42     Last Colonoscopy: Not on file    advised age 47    Gardisil: Completed Patient had 2 doses of the HPV vaccine .      Subjective    CC: Yearly Exam, 1st after delivery, EPDS score 9     Isabel Mcneil is a 33 y.o. female here for an annual exam.   GYN hx includes:  2 vaginal deliveries   No personal Hx of breast, cervical, ovarian or colon CA.   Family hx of: Breast cancer   Medically stable, reports no changes in medical Hx, follows with PMD    No LMP recorded. Lactational amenorrhea    Denies history of abnormal pap smear.  She denies breast concerns, abnormal vaginal discharge, vaginal itching, odor, irritation, bowel/bladder dysfunction, urinary symptoms, pelvic pain, or dyspareunia today.   She is sexually active. Monogamous relationship.    Her current method of contraception includes male vasectomy . Denies any issues with her BCM.   She does not want STD testing today.  Denies intimate partner violence    Past Medical History:   Diagnosis Date    Asthma     childhood asthma  inhaler prn    Autism spectrum disorder     Depression     diag with depression/anxiety age 20 -no meds now    Migraine     diag 2 yrs ago- takes extra-strength  tylenol    Termination of pregnancy (fetus)     Urinary tract infection     hx of  last episode 2018    Varicella     received vaccines     Past Surgical History:   Procedure Laterality Date    NO PAST SURGERIES      WISDOM TOOTH EXTRACTION  age 22       Immunization History   Administered Date(s) Administered    H1N1, All Formulations 2009, 2009    HPV Quadrivalent 10/12/2011, 2012    INFLUENZA 10/06/2009, 10/12/2011, 2014, 10/28/2016    Influenza, injectable, quadrivalent, preservative free 0.5 mL 2019    Pneumococcal Polysaccharide PPV23 2014    Tdap 2012, 2019, 2023    Tuberculin Skin Test-PPD Intradermal 2013       Family History   Problem Relation Age of Onset    Asthma Mother     Bipolar disorder Mother     Post-traumatic stress disorder Mother     Diabetes Mother     Anxiety disorder Father     Depression Sister     Drug abuse Sister     No Known Problems Daughter     Cancer Maternal Grandfather     No Known Problems Paternal Grandmother     No Known Problems Paternal Grandfather     Depression Maternal Uncle     Anxiety disorder Maternal Uncle     Diabetes Paternal Aunt     Breast cancer Family     Colon cancer Neg Hx     Ovarian cancer Neg Hx     Uterine cancer Neg Hx     Cervical cancer Neg Hx      Social History     Tobacco Use    Smoking status: Former     Current packs/day: 0.00     Average packs/day: 0.5 packs/day for 10.0 years (5.0 ttl pk-yrs)     Types: Cigarettes     Start date: 10/1/2008     Quit date: 10/1/2018     Years since quittin.3    Smokeless tobacco: Never    Tobacco comments:     smokes 1 cigarette per dAY!   Vaping Use    Vaping status: Never Used   Substance Use Topics    Alcohol use: Not Currently     Comment: occasional-none since pregnancy    Drug use: Not Currently     Types: Marijuana     Comment: Lst used 2018     No current outpatient medications on file.  Patient Active Problem List    Diagnosis Date Noted     "Dog bite 2023    Strain of back 2023    History of gestational hypertension 2023    GBS bacteriuria 2023    Obesity affecting pregnancy in third trimester 2023    Orthostatic hypotension 2022    Raynaud's phenomenon without gangrene 2022    H/O major depression 2019    High-functioning autism spectrum disorder 08/10/2018    Current moderate episode of major depressive disorder without prior episode ([facility]) 2018    Mild intermittent asthma without complication 2018       No Known Allergies    OB History    Para Term  AB Living   3 2 2 0 1 2   SAB IAB Ectopic Multiple Live Births   0 0 0 0 2      # Outcome Date GA Lbr Anibal/2nd Weight Sex Delivery Anes PTL Lv   3 Term 23 40w1d / 00:17 3690 g (8 lb 2.2 oz) F Vag-Spont EPI N JUDY   2 Term 19 41w6d  3572 g (7 lb 14 oz) F Vag-Spont EPI N JUDY   1 AB                Vitals:    24 1429   BP: 124/70   BP Location: Left arm   Patient Position: Sitting   Cuff Size: Large   Weight: 106 kg (234 lb)   Height: 5' 9\" (1.753 m)     Body mass index is 34.56 kg/m².    Review of Systems     Constitutional: Negative for chills, fatigue, fever, headaches, visual disturbances, and unexpected weight change.   Respiratory: Negative for cough, & shortness of breath.  Cardiovascular: Negative for chest pain. .    Gastrointestinal: Negative for Abd pain, nausea & vomiting, constipation and diarrhea.   Genitourinary: Negative for difficulty urinating, dysuria, hematuria, dyspareunia, unusual vaginal bleeding or discharge  Skin: Negative skin changes    Physical Exam     Constitutional: Alert & Oriented x3, well-developed and well-nourished. No distress.   HENT: Atraumatic, Normocephalic, Conjunctivae clear  Neck: Normal range of motion. Neck supple. No thyromegaly, mass, nodules or tenderness  Pulmonary: Effort normal.   Abdominal: Soft. No tenderness or masses  Musculoskeletal: Normal ROM  Skin: Warm & " Dry  Psychological: Normal mood, thought content, behavior & judgement     Breasts: Lactational   Right: tissue soft without masses, tenderness, skin changes or nipple discharge. No areas of erythema or pain. No subclavicular, axillary, pectoral adenopathy  Left:  tissue soft without masses, tenderness, skin changes or nipple discharge. No areas of erythema or pain. No subclavicular, axillary, pectoral adenopathy    Pelvic exam was performed with patient supine, lithotomy position.      Labia: Negative rash, tenderness, lesion or injury on the right labia.              Negative rash, tenderness, lesion or injury on the left labia.   Urethral meatus:  Negative for  tenderness, inflammation or discharge.   Uterus: not deviated, enlarged, fixed or tender.   Cervix: No CMT, no discharge or friability.   Right adnexa: no mass, no tenderness and no fullness.  Left adnexa: no mass, no tenderness and no fullness.   Vagina: No erythema, tenderness, masses, or foreign body in the vagina. No signs of injury around the vagina. No unusual vaginal discharge   Perineum without lesions, signs of injury, erythema or swelling.  Inguinal Canal:        Right: No inguinal adenopathy or hernia present.        Left: No inguinal adenopathy or hernia present.

## 2024-01-31 ENCOUNTER — ANNUAL EXAM (OUTPATIENT)
Dept: OBGYN CLINIC | Facility: CLINIC | Age: 34
End: 2024-01-31
Payer: COMMERCIAL

## 2024-01-31 VITALS
SYSTOLIC BLOOD PRESSURE: 124 MMHG | DIASTOLIC BLOOD PRESSURE: 70 MMHG | WEIGHT: 234 LBS | BODY MASS INDEX: 34.66 KG/M2 | HEIGHT: 69 IN

## 2024-01-31 DIAGNOSIS — Z01.419 ENCOUNTER FOR GYNECOLOGICAL EXAMINATION WITHOUT ABNORMAL FINDING: Primary | ICD-10-CM

## 2024-01-31 PROCEDURE — 99395 PREV VISIT EST AGE 18-39: CPT | Performed by: OBSTETRICS & GYNECOLOGY

## 2024-01-31 NOTE — PATIENT INSTRUCTIONS
Breast Self Exam for Women   AMBULATORY CARE:   A breast self-exam (BSE)  is a way to check your breasts for lumps and other changes. Regular BSEs can help you know how your breasts normally look and feel. Most breast lumps or changes are not cancer, but you should always have them checked by a healthcare provider.  Why you should do a BSE:  Breast cancer is the most common type of cancer in women. Even if you have mammograms, you may still want to do a BSE regularly. If you know how your breasts normally feel and look, it may help you know when to contact your healthcare provider. Mammograms can miss some cancers. You may find a lump during a BSE that did not show up on a mammogram.  When you should do a BSE:  If you have periods, you may want to do your BSE 1 week after your period ends. This is the time when your breasts may be the least swollen, lumpy, or tender. You can do regular BSEs even if you are breastfeeding or have breast implants.  Call your doctor if:   You find any lumps or changes in your breasts.    You have breast pain or fluid coming from your nipples.    You have questions or concerns about your condition or care.    How to do a BSE:       Look at your breasts in a mirror.  Look at the size and shape of each breast and nipple. Check for swelling, lumps, dimpling, scaly skin, or other skin changes. Look for nipple changes, such as a nipple that is painful or beginning to pull inward. Gently squeeze both nipples and check to see if fluid (that is not breast milk) comes out of them. If you find any of these or other breast changes, contact your healthcare provider. Check your breasts while you sit or  the following 3 positions:    Hang your arms down at your sides.    Raise your hands and join them behind your head.    Put firm pressure with your hands on your hips. Bend slightly forward while you look at your breasts in the mirror.    Lie down and feel your breasts.  When you lie down,  your breast tissue spreads out evenly over your chest. This makes it easier for you to feel for lumps and anything that may not be normal for your breasts. Do a BSE on one breast at a time.    Place a small pillow or towel under your left shoulder.  Put your left arm behind your head.    Use the 3 middle fingers of your right hand.  Use your fingertip pads, on the top of your fingers. Your fingertip pad is the most sensitive part of your finger.    Use small circles to feel your breast tissue.  Use your fingertip pads to make dime-sized, overlapping circles on your breast and armpits. Use light, medium, and firm pressure. First, press lightly. Second, press with medium pressure to feel a little deeper into the breast. Last, use firm pressure to feel deep within your breast.    Examine your entire breast area.  Examine the breast area from above the breast to below the breast where you feel only ribs. Make small circles with your fingertips, starting in the middle of your armpit. Make circles going up and down the breast area. Continue toward your breast and all the way across it. Examine the area from your armpit all the way over to the middle of your chest (breastbone). Stop at the middle of your chest.    Move the pillow or towel to your right shoulder, and put your right arm behind your head.  Use the 3 fingertip pads of your left hand, and repeat the above steps to do a BSE on your right breast.  What else you can do to check for breast problems or cancer:  Talk to your healthcare provider about mammograms. A mammogram is an x-ray of your breasts to screen for breast cancer or other problems. Your provider can tell you the benefits and risks of mammograms. The first mammogram is usually at age 45 or 50. Your provider may recommend you start at 40 or younger if your risk for breast cancer is high. Mammograms usually continue every 1 to 2 years until age 74.       Follow up with your doctor as directed:  Write  down your questions so you remember to ask them during your visits.  © Copyright [employer] 2023 Information is for End User's use only and may not be sold, redistributed or otherwise used for commercial purposes.  The above information is an  only. It is not intended as medical advice for individual conditions or treatments. Talk to your doctor, nurse or pharmacist before following any medical regimen to see if it is safe and effective for you.  Wellness Visit for Adults   AMBULATORY CARE:   A wellness visit  is when you see your healthcare provider to get screened for health problems. Your healthcare provider will also give you advice on how to stay healthy. Write down your questions so you remember to ask them. Ask your healthcare provider how often you should have a wellness visit.  What happens at a wellness visit:  Your healthcare provider will ask about your health, and your family history of health problems. This includes high blood pressure, heart disease, and cancer. He or she will ask if you have symptoms that concern you, if you smoke, and about your mood. You may also be asked about your intake of medicines, supplements, food, and alcohol. Any of the following may be done:  Your weight  will be checked. Your height may also be checked so your body mass index (BMI) can be calculated. Your BMI shows if you are at a healthy weight.    Your blood pressure  and heart rate will be checked. Your temperature may also be checked.    Blood and urine tests  may be done. Blood tests may be done to check your cholesterol levels. Abnormal cholesterol levels increase your risk for heart disease and stroke. You may also need a blood or urine test to check for diabetes if you are at increased risk. Urine tests may be done to look for signs of an infection or kidney disease.    A physical exam  includes checking your heartbeat and lungs with a stethoscope. Your healthcare provider may also check your skin to  look for sun damage.    Screening tests  may be recommended. A screening test is done to check for diseases that may not cause symptoms. The screening tests you may need depend on your age, gender, family history, and lifestyle habits. For example, colorectal screening may be recommended if you are 50 years old or older.    Screening tests you need if you are a woman:   A Pap smear  is used to screen for cervical cancer. Pap smears are usually done every 3 to 5 years depending on your age. You may need them more often if you have had abnormal Pap smear test results in the past. Ask your healthcare provider how often you should have a Pap smear.    A mammogram  is an x-ray of your breasts to screen for breast cancer. Experts recommend mammograms every 2 years starting at age 50 years. You may need a mammogram at age 49 years or younger if you have an increased risk for breast cancer. Talk to your healthcare provider about when you should start having mammograms and how often you need them.    Vaccines you may need:   Get an influenza vaccine  every year. The influenza vaccine protects you from the flu. Several types of viruses cause the flu. The viruses change over time, so new vaccines are made each year.    Get a tetanus-diphtheria (Td) booster vaccine  every 10 years. This vaccine protects you against tetanus and diphtheria. Tetanus is a severe infection that may cause painful muscle spasms and lockjaw. Diphtheria is a severe bacterial infection that causes a thick covering in the back of your mouth and throat.    Get a human papillomavirus (HPV) vaccine  if you are female and aged 19 to 26 or male 19 to 21 and never received it. This vaccine protects you from HPV infection. HPV is the most common infection spread by sexual contact. HPV may also cause vaginal, penile, and anal cancers.    Get a pneumococcal vaccine  if you are aged 65 years or older. The pneumococcal vaccine is an injection given to protect you  from pneumococcal disease. Pneumococcal disease is an infection caused by pneumococcal bacteria. The infection may cause pneumonia, meningitis, or an ear infection.    Get a shingles vaccine  if you are 60 or older, even if you have had shingles before. The shingles vaccine is an injection to protect you from the varicella-zoster virus. This is the same virus that causes chickenpox. Shingles is a painful rash that develops in people who had chickenpox or have been exposed to the virus.    How to eat healthy:  My Plate is a model for planning healthy meals. It shows the types and amounts of foods that should go on your plate. Fruits and vegetables make up about half of your plate, and grains and protein make up the other half. A serving of dairy is included on the side of your plate. The amount of calories and serving sizes you need depends on your age, gender, weight, and height. Examples of healthy foods are listed below:  Eat a variety of vegetables  such as dark green, red, and orange vegetables. You can also include canned vegetables low in sodium (salt) and frozen vegetables without added butter or sauces.    Eat a variety of fresh fruits , canned fruit in 100% juice, frozen fruit, and dried fruit.    Include whole grains.  At least half of the grains you eat should be whole grains. Examples include whole-wheat bread, wheat pasta, brown rice, and whole-grain cereals such as oatmeal.    Eat a variety of protein foods such as seafood (fish and shellfish), lean meat, and poultry without skin (turkey and chicken). Examples of lean meats include pork leg, shoulder, or tenderloin, and beef round, sirloin, tenderloin, and extra lean ground beef. Other protein foods include eggs and egg substitutes, beans, peas, soy products, nuts, and seeds.    Choose low-fat dairy products such as skim or 1% milk or low-fat yogurt, cheese, and cottage cheese.    Limit unhealthy fats  such as butter, hard margarine, and shortening.        Exercise:  Exercise at least 30 minutes per day on most days of the week. Some examples of exercise include walking, biking, dancing, and swimming. You can also fit in more physical activity by taking the stairs instead of the elevator or parking farther away from stores. Include muscle strengthening activities 2 days each week. Regular exercise provides many health benefits. It helps you manage your weight, and decreases your risk for type 2 diabetes, heart disease, stroke, and high blood pressure. Exercise can also help improve your mood. Ask your healthcare provider about the best exercise plan for you.       General health and safety guidelines:   Do not smoke.  Nicotine and other chemicals in cigarettes and cigars can cause lung damage. Ask your healthcare provider for information if you currently smoke and need help to quit. E-cigarettes or smokeless tobacco still contain nicotine. Talk to your healthcare provider before you use these products.    Limit alcohol.  A drink of alcohol is 12 ounces of beer, 5 ounces of wine, or 1½ ounces of liquor.    Lose weight, if needed.  Being overweight increases your risk of certain health conditions. These include heart disease, high blood pressure, type 2 diabetes, and certain types of cancer.    Protect your skin.  Do not sunbathe or use tanning beds. Use sunscreen with a SPF 15 or higher. Apply sunscreen at least 15 minutes before you go outside. Reapply sunscreen every 2 hours. Wear protective clothing, hats, and sunglasses when you are outside.    Drive safely.  Always wear your seatbelt. Make sure everyone in your car wears a seatbelt. A seatbelt can save your life if you are in an accident. Do not use your cell phone when you are driving. This could distract you and cause an accident. Pull over if you need to make a call or send a text message.    Practice safe sex.  Use latex condoms if are sexually active and have more than one partner. Your healthcare  provider may recommend screening tests for sexually transmitted infections (STIs).    Wear helmets, lifejackets, and protective gear.  Always wear a helmet when you ride a bike or motorcycle, go skiing, or play sports that could cause a head injury. Wear protective equipment when you play sports. Wear a lifejacket when you are on a boat or doing water sports.    © Copyright [employer] 2023 Information is for End User's use only and may not be sold, redistributed or otherwise used for commercial purposes.  The above information is an  only. It is not intended as medical advice for individual conditions or treatments. Talk to your doctor, nurse or pharmacist before following any medical regimen to see if it is safe and effective for you.  HPV (Human Papillomavirus)   AMBULATORY CARE:   Human Papillomavirus (HPV)  is the name for a group of viruses that can infect your skin or other parts of your body. HPV is the most common infection spread by sexual contact. It can also be spread from a mother to her baby during delivery.  Common symptoms include the following:   Painless warts in your mouth or on your genitals    Genital or anal discharge, bleeding, itching, or pain    Pain when you urinate    Call your doctor if:   You have new or worsening symptoms.    You have questions or concerns about your condition or care.    HPV diagnosis:  Your healthcare provider may use a vinegar liquid to help diagnose HPV genital warts. Women 30 to 65 years old can be checked for HPV during regular cervical cancer screenings. An HPV test checks for certain types of HPV that can cause changes in cervical cells. Without treatment, the changed cells can become cancer. An HPV test can be done every 5 years if the results show no infection. The test can be done with or without a Pap smear. A Pap smear checks for cancer or for abnormal cells that can become cancer. You may be tested for HPV if you have mouth or throat  cancer.  Treatment:  HPV cannot be cured, but an infection may go away on its own in about 2 years without causing problems. If the infection continues, some types of HPV can lead to health conditions that need to be treated. Examples include warts and certain cancers, especially squamous cell carcinoma (SCC). HPV-linked SCCs commonly develop in the anus, throat (called oropharyngeal cancer), cervix, vagina, penis, or mouth. HPV can also cause a type of cervical cancer called adenocarcinoma. Symptoms of any of these conditions may not develop for several years after you were exposed to HPV. You will need to be monitored closely. Ask your healthcare provider for more information about monitoring, conditions caused by HPV, and available treatments.  Prevent an HPV infection:  HPV is usually spread through sexual activity. The following can help prevent infection:  Ask about the HPV vaccine.  The HPV vaccine is given to females and males, usually at 11 or 12 years of age. It can be given from 9 years through 45 years of age, if needed. It is most effective if given before sexual activity begins.         Use a new condom, contraceptive barrier, or dental dam each time you have sex.  This includes oral, vaginal, and anal sex. Talk to your healthcare provider if you have any questions about what to use or how to use it.    Follow up with your doctor as directed:  Write down your questions so you remember to ask them during your visits.  © Copyright [employer] 2023 Information is for End User's use only and may not be sold, redistributed or otherwise used for commercial purposes.  The above information is an  only. It is not intended as medical advice for individual conditions or treatments. Talk to your doctor, nurse or pharmacist before following any medical regimen to see if it is safe and effective for you.  Kegel Exercises for Women   AMBULATORY CARE:   Kegel exercises  help strengthen your pelvic  muscles. Pelvic muscles hold your pelvic organs, such as your bladder and uterus, in place. Kegel exercises help prevent or control certain conditions, such as urine incontinence (leakage) or uterine prolapse.       Call your doctor or physical therapist if:   You cannot feel your muscles tighten or relax.    You continue to leak urine.    You have questions or concerns about your condition or care.    Use the correct muscles:  Pelvic muscles are the muscles you use to control urine flow. To target these muscles, stop and start the flow of urine several times. This will help you become familiar with how it feels to tighten and relax these muscles.  How to do Kegel exercises:   Get into a comfortable position.  You may lie down, stand up, or sit down to do these exercises. When you first try to do these exercises, it may be easier if you lie down.    Tighten or squeeze your pelvic muscles slowly.  It may feel like you are trying to hold back urine or gas. Hold this position for 3 seconds. Relax for 3 seconds. Repeat this cycle 10 times. Do not hold your breath when you do Kegel exercises. Keep your stomach, back, and leg muscles relaxed.    Do 10 sets of Kegel exercises, at least 3 times a day.  When you know how to do Kegel exercises, use different positions. This will help to strengthen your pelvic muscles as much as possible. You can do these exercises while you lie on the floor, watch TV, or while you stand. Tighten your pelvic muscles before you sneeze, cough, or lift to prevent urine leakage. You may notice improved bladder control within about 6 weeks.    Follow up with your doctor or physical therapist as directed:  Write down your questions so you remember to ask them during your visits.  © Copyright [employer] 2023 Information is for End User's use only and may not be sold, redistributed or otherwise used for commercial purposes.  The above information is an  only. It is not intended as medical  advice for individual conditions or treatments. Talk to your doctor, nurse or pharmacist before following any medical regimen to see if it is safe and effective for you.       Perineal Hygiene      Your vaginal naturally takes care of its self, it is a self washing system, the less you mess the healthier it will be     No soaps or feminine wash to the vulva, these products can cause dermitis, bacterial infections and other vulvar problems.   Use only water to cleanse, or water with Dove or Dove Sensitive Skin Bar soap if necessary.    No scented lotions or products are advised in or near your vulva.    Use only coconut oil for moisture if needed.  No douching this may cause imbalance in your vaginal PH and further issues.    If you wear panty liners, you may apply a thin coating of Vaseline, A&D ointment or coconut oil to the vulvar tissues as a skin barrier     Cotton underware, loose fitting clothing  Only perfume-free, dye-free laundry detergent, use a second rinse cycle   Avoid fabric softeners/dryer sheets.       Your partner should avoid the same products as well.       Over the counter probiotic to restore vaginal adela may be helpful as well, take daily.       You may also look into Boric Acid vaginal suppositories to restore vaginal PH balance for up to 2 weeks as directed on the box. You may not use these if you are pregnant      For vaginal dryness:      You may use:     Coconut oil (organic, pure, unscented) as needed for moisture or lubrication. ( Do not use if allergic)       Replens moisture restore external comfort gel daily ( use as directed on the box)        Replens long lasting vaginal moisturizer  ( use as directed on the box)         For Vaginal Lubrication:          You may use:     Coconut oil (organic, pure, unscented) as a lubricant or another scent-free lubricant (Astroglide, Uberlube) if needed.  Do not use coconut oil or silicone if using a condom as this may break down the integrity of  the condom and cause an unplanned pregnancy              Do not use coconut oil if allergic               Replens silky smooth lubricant, premium silicone based lubricant for intercourse. ( use as directed, a small amount will provide an enhanced natural feeling)     Any premium over the counter vaginal lubricant water or silicone based. Silicone based will have more staying power.

## 2024-05-08 ENCOUNTER — OFFICE VISIT (OUTPATIENT)
Dept: FAMILY MEDICINE CLINIC | Facility: CLINIC | Age: 34
End: 2024-05-08
Payer: COMMERCIAL

## 2024-05-08 VITALS
DIASTOLIC BLOOD PRESSURE: 80 MMHG | HEIGHT: 69 IN | BODY MASS INDEX: 33.77 KG/M2 | HEART RATE: 65 BPM | SYSTOLIC BLOOD PRESSURE: 122 MMHG | TEMPERATURE: 97.6 F | WEIGHT: 228 LBS | OXYGEN SATURATION: 97 %

## 2024-05-08 DIAGNOSIS — M72.2 PLANTAR FASCIITIS, BILATERAL: Primary | ICD-10-CM

## 2024-05-08 PROCEDURE — 99214 OFFICE O/P EST MOD 30 MIN: CPT | Performed by: FAMILY MEDICINE

## 2024-05-08 RX ORDER — IBUPROFEN 600 MG/1
600 TABLET ORAL 2 TIMES DAILY
Qty: 60 TABLET | Refills: 0 | Status: SHIPPED | OUTPATIENT
Start: 2024-05-08

## 2024-05-08 NOTE — PROGRESS NOTES
"Assessment/Plan:    No problem-specific Assessment & Plan notes found for this encounter.     Diagnoses and all orders for this visit:    Plantar fasciitis, bilateral  -     Ambulatory Referral to Podiatry; Future  -     ibuprofen (MOTRIN) 600 mg tablet; Take 1 tablet (600 mg total) by mouth 2 (two) times a day        Subjective:      Patient ID: Isabel Mcneil is a 33 y.o. female.    HPI    Notes that after sitting, when she goes to walk, both feet hurt.   Sharp pain in the under side of her feet.   Notes that this is worst first thing in the morning.     Notes that this has been going on for about 1 year.     States that she things that she is ahving some brusing.   Has not used anything OTC.     The following portions of the patient's history were reviewed and updated as appropriate: allergies, current medications, past family history, past medical history, past social history, past surgical history, and problem list.    Review of Systems      Objective:    /80   Pulse 65   Temp 97.6 °F (36.4 °C)   Ht 5' 9\" (1.753 m)   Wt 103 kg (228 lb)   SpO2 97%   BMI 33.67 kg/m²      Physical Exam  Vitals reviewed.   Constitutional:       General: She is not in acute distress.     Appearance: Normal appearance.   HENT:      Head: Normocephalic and atraumatic.      Right Ear: External ear normal.      Left Ear: External ear normal.      Nose: Nose normal.      Mouth/Throat:      Mouth: Mucous membranes are moist.   Eyes:      Extraocular Movements: Extraocular movements intact.      Conjunctiva/sclera: Conjunctivae normal.   Cardiovascular:      Rate and Rhythm: Normal rate and regular rhythm.   Pulmonary:      Effort: Pulmonary effort is normal.      Breath sounds: Normal breath sounds. No wheezing, rhonchi or rales.   Abdominal:      General: Bowel sounds are normal. There is no distension.      Palpations: Abdomen is soft.      Tenderness: There is no abdominal tenderness.   Musculoskeletal:      Right lower leg: " No edema.      Left lower leg: No edema.   Skin:     General: Skin is warm.      Capillary Refill: Capillary refill takes less than 2 seconds.      Findings: No rash.   Neurological:      Mental Status: She is alert. Mental status is at baseline.           DO Davey Nunez [facility]

## 2024-06-03 ENCOUNTER — OFFICE VISIT (OUTPATIENT)
Dept: URGENT CARE | Facility: CLINIC | Age: 34
End: 2024-06-03
Payer: COMMERCIAL

## 2024-06-03 VITALS
RESPIRATION RATE: 19 BRPM | TEMPERATURE: 98 F | SYSTOLIC BLOOD PRESSURE: 135 MMHG | WEIGHT: 222 LBS | BODY MASS INDEX: 32.78 KG/M2 | DIASTOLIC BLOOD PRESSURE: 80 MMHG | HEART RATE: 81 BPM | OXYGEN SATURATION: 96 %

## 2024-06-03 DIAGNOSIS — H10.9 BACTERIAL CONJUNCTIVITIS OF RIGHT EYE: Primary | ICD-10-CM

## 2024-06-03 DIAGNOSIS — L03.213 PRESEPTAL CELLULITIS OF RIGHT LOWER EYELID: ICD-10-CM

## 2024-06-03 PROCEDURE — 99213 OFFICE O/P EST LOW 20 MIN: CPT | Performed by: STUDENT IN AN ORGANIZED HEALTH CARE EDUCATION/TRAINING PROGRAM

## 2024-06-03 RX ORDER — TOBRAMYCIN 3 MG/ML
1 SOLUTION/ DROPS OPHTHALMIC
Qty: 5 ML | Refills: 0 | Status: SHIPPED | OUTPATIENT
Start: 2024-06-03

## 2024-06-03 RX ORDER — AMOXICILLIN AND CLAVULANATE POTASSIUM 875; 125 MG/1; MG/1
1 TABLET, FILM COATED ORAL EVERY 12 HOURS SCHEDULED
Qty: 10 TABLET | Refills: 0 | Status: SHIPPED | OUTPATIENT
Start: 2024-06-03 | End: 2024-06-08

## 2024-06-03 NOTE — PROGRESS NOTES
[facility]        NAME: Isabel Mcneil is a 33 y.o. female  : 1990    MRN: [id number]  DATE: Claudine 3, 2024  TIME: 6:07 PM    Assessment and Orders   Bacterial conjunctivitis of right eye [H10.9]  1. Bacterial conjunctivitis of right eye  tobramycin (TOBREX) 0.3 % SOLN      2. Preseptal cellulitis of right lower eyelid  amoxicillin-clavulanate (AUGMENTIN) 875-125 mg per tablet            Plan and Discussion      Symptoms and exam consistent with preseptal cellulitis (very mild, should respond well to oral Augmentin) and conjunctivitis which we will treat with topical tobramycin. Discussed signs of orbital cellulitis and patient understands that she is to be evaluated in the Ed should these occur.     Risks and benefits discussed. Patient understands and agrees with the plan.     PATIENT INSTRUCTIONS    If tests have been performed at [facility], our office will contact you with results if changes need to be made to the care plan discussed with you at the visit.  You can review your full results on [facility] MyChart.    Follow up with PCP.     If any of the following occur, please report to your nearest ED for evaluation or call 911.   Difficultly breathing or shortness of breath  Chest pain  Acutely worsening symptoms.         Chief Complaint     Chief Complaint   Patient presents with    Eye Problem     This am, eye pain and discharge. Feels like its gonna pop.          History of Present Illness       Eye Problem   The right eye is affected. This is a new problem. The current episode started today. The problem occurs constantly. The problem has been gradually worsening. There was no injury mechanism. Associated symptoms include an eye discharge and eye redness. Pertinent negatives include no fever, foreign body sensation or recent URI.       Review of Systems   Review of Systems   Constitutional:  Negative for fever.   Eyes:  Positive for discharge and redness.         Current Medications        Current Outpatient Medications:     amoxicillin-clavulanate (AUGMENTIN) 875-125 mg per tablet, Take 1 tablet by mouth every 12 (twelve) hours for 5 days, Disp: 10 tablet, Rfl: 0    ibuprofen (MOTRIN) 600 mg tablet, Take 1 tablet (600 mg total) by mouth 2 (two) times a day, Disp: 60 tablet, Rfl: 0    tobramycin (TOBREX) 0.3 % SOLN, Administer 1 drop to the right eye every 4 (four) hours while awake, Disp: 5 mL, Rfl: 0    Current Allergies     Allergies as of 06/03/2024    (No Known Allergies)            The following portions of the patient's history were reviewed and updated as appropriate: allergies, current medications, past family history, past medical history, past social history, past surgical history and problem list.     Past Medical History:   Diagnosis Date    Asthma     childhood asthma  inhaler prn    Autism spectrum disorder     Depression     diag with depression/anxiety age 20 -no meds now    Migraine     diag 2 yrs ago- takes extra-strength tylenol    Termination of pregnancy (fetus)     Urinary tract infection     hx of  last episode 2018    Varicella     received vaccines       Past Surgical History:   Procedure Laterality Date    NO PAST SURGERIES      WISDOM TOOTH EXTRACTION  age 22       Family History   Problem Relation Age of Onset    Asthma Mother     Bipolar disorder Mother     Post-traumatic stress disorder Mother     Diabetes Mother     Anxiety disorder Father     Depression Sister     Drug abuse Sister     Kidney disease Sister     No Known Problems Daughter     Cancer Maternal Grandfather     No Known Problems Paternal Grandmother     No Known Problems Paternal Grandfather     Depression Maternal Uncle     Anxiety disorder Maternal Uncle     Diabetes Paternal Aunt     Breast cancer Family     Colon cancer Neg Hx     Ovarian cancer Neg Hx     Uterine cancer Neg Hx     Cervical cancer Neg Hx          Medications have been verified.        Objective   /80   Pulse 81   Temp 98  °F (36.7 °C)   Resp 19   Wt 101 kg (222 lb)   SpO2 96%   BMI 32.78 kg/m²   No LMP recorded.       Physical Exam     Physical Exam  Eyes:      General:         Right eye: Discharge present. No foreign body.      Extraocular Movements:      Right eye: Normal extraocular motion.      Left eye: Normal extraocular motion.      Conjunctiva/sclera:      Right eye: Right conjunctiva is injected.      Comments: Erythema around right eye. Not tender. No pain with EOM.    Cardiovascular:      Rate and Rhythm: Normal rate.   Pulmonary:      Effort: No respiratory distress.   Neurological:      General: No focal deficit present.      Mental Status: She is alert and oriented to person, place, and time.   Psychiatric:         Mood and Affect: Mood normal.         Behavior: Behavior normal.               Ting Uriostegui DO

## 2024-06-20 ENCOUNTER — APPOINTMENT (OUTPATIENT)
Dept: LAB | Facility: CLINIC | Age: 34
End: 2024-06-20

## 2024-06-20 DIAGNOSIS — Z00.6 ENCOUNTER FOR EXAMINATION FOR NORMAL COMPARISON OR CONTROL IN CLINICAL RESEARCH PROGRAM: ICD-10-CM

## 2024-06-20 PROCEDURE — 36415 COLL VENOUS BLD VENIPUNCTURE: CPT

## 2024-07-03 LAB
APOB+LDLR+PCSK9 GENE MUT ANL BLD/T: NOT DETECTED
BRCA1+BRCA2 DEL+DUP + FULL MUT ANL BLD/T: NOT DETECTED
MLH1+MSH2+MSH6+PMS2 GN DEL+DUP+FUL M: NOT DETECTED

## 2025-02-24 ENCOUNTER — OFFICE VISIT (OUTPATIENT)
Dept: FAMILY MEDICINE CLINIC | Facility: CLINIC | Age: 35
End: 2025-02-24
Payer: COMMERCIAL

## 2025-02-24 ENCOUNTER — NURSE TRIAGE (OUTPATIENT)
Age: 35
End: 2025-02-24

## 2025-02-24 VITALS
HEART RATE: 76 BPM | SYSTOLIC BLOOD PRESSURE: 112 MMHG | BODY MASS INDEX: 32.88 KG/M2 | RESPIRATION RATE: 18 BRPM | WEIGHT: 222 LBS | OXYGEN SATURATION: 98 % | HEIGHT: 69 IN | DIASTOLIC BLOOD PRESSURE: 74 MMHG

## 2025-02-24 DIAGNOSIS — R10.9 ABDOMINAL CRAMPING: ICD-10-CM

## 2025-02-24 DIAGNOSIS — K29.00 ACUTE GASTRITIS, PRESENCE OF BLEEDING UNSPECIFIED, UNSPECIFIED GASTRITIS TYPE: ICD-10-CM

## 2025-02-24 DIAGNOSIS — K29.00 ACUTE GASTRITIS, PRESENCE OF BLEEDING UNSPECIFIED, UNSPECIFIED GASTRITIS TYPE: Primary | ICD-10-CM

## 2025-02-24 PROCEDURE — 99214 OFFICE O/P EST MOD 30 MIN: CPT | Performed by: FAMILY MEDICINE

## 2025-02-24 RX ORDER — PANTOPRAZOLE SODIUM 40 MG/1
40 TABLET, DELAYED RELEASE ORAL DAILY
Qty: 30 TABLET | Refills: 0 | Status: SHIPPED | OUTPATIENT
Start: 2025-02-24 | End: 2025-02-24 | Stop reason: SDUPTHER

## 2025-02-24 NOTE — TELEPHONE ENCOUNTER
Pt called stating the Pantoprazole prescription was sent to the wrong pharmacy. Pt asking for it to be resent to [facility] [place]    Please advise

## 2025-02-24 NOTE — PROGRESS NOTES
"Name: Isabel Mcneil      : 1990      MRN: [id number]  Encounter Provider: Eva Bender DO  Encounter Date: 2025   Encounter department: [facility] [address]  :  Assessment & Plan  Acute gastritis, presence of bleeding unspecified, unspecified gastritis type  Discussed BRAT diet and monitoring symptoms in addition to PPI use. F/U if no improvement.   Orders:  •  pantoprazole (PROTONIX) 40 mg tablet; Take 1 tablet (40 mg total) by mouth daily    Abdominal cramping  Discussed probiotics due to recent ABX use. Discussed Bentyl, patient is breastfeeding at this time.        No signs of systemic infection like flu or COVID, unlikely c.diff as there is no diarrhea. No hx of previous or diverticulitis with nontender lower abdomen.      History of Present Illness   HPI    Started Amoxil last week due to tooth extraction, states that she was not able to eat due to the tooth. States that she took this for 2-3 days (not with food) and then stopped taking it because of the abdominal symptoms (pain, cramping). Notes nausea, no vomiting. States that she has no appetite. Notes belching and heartburn. Denies fever or chills. Denies body aches. Denies cough or nasal congestion. Last had something to eat, dinner last night (baked potato and cheese burger). Has been drinking water. States she has been urinating as usual. Denies burning or discomfort with urination. Denies vaginal discharge or itch or irritation.     Notes lower abdominal cramps and pain in the upper abdomen. Denies diarrhea. Denies constipation. Denies blood in the stool.     No other new medications.     Not sexually active, denies any chance of pregnancy.     Review of Systems    Objective   /74 (BP Location: Left arm, Patient Position: Sitting, Cuff Size: Standard)   Pulse 76   Resp 18   Ht 5' 9\" (1.753 m)   Wt 101 kg (222 lb)   SpO2 98%   BMI 32.78 kg/m²      Physical Exam  Vitals reviewed. "   Constitutional:       General: She is not in acute distress.     Appearance: Normal appearance.   HENT:      Head: Normocephalic and atraumatic.      Right Ear: External ear normal.      Left Ear: External ear normal.      Nose: Nose normal.   Eyes:      Extraocular Movements: Extraocular movements intact.      Conjunctiva/sclera: Conjunctivae normal.   Cardiovascular:      Rate and Rhythm: Normal rate and regular rhythm.   Pulmonary:      Effort: Pulmonary effort is normal. No respiratory distress.      Breath sounds: Normal breath sounds. No wheezing, rhonchi or rales.   Abdominal:      General: Bowel sounds are normal. There is no distension.      Palpations: Abdomen is soft. There is no mass.      Tenderness: There is abdominal tenderness (epigastric, mild). There is no guarding.   Skin:     General: Skin is warm.   Neurological:      Mental Status: She is alert. Mental status is at baseline.             DO Davey Nunez [facility]  2/24/2025 1:49 PM

## 2025-02-25 RX ORDER — PANTOPRAZOLE SODIUM 40 MG/1
40 TABLET, DELAYED RELEASE ORAL DAILY
Qty: 30 TABLET | Refills: 0 | Status: SHIPPED | OUTPATIENT
Start: 2025-02-25

## 2025-04-30 ENCOUNTER — NURSE TRIAGE (OUTPATIENT)
Age: 35
End: 2025-04-30

## 2025-04-30 ENCOUNTER — OFFICE VISIT (OUTPATIENT)
Age: 35
End: 2025-04-30
Payer: COMMERCIAL

## 2025-04-30 VITALS
WEIGHT: 203.8 LBS | SYSTOLIC BLOOD PRESSURE: 132 MMHG | OXYGEN SATURATION: 100 % | DIASTOLIC BLOOD PRESSURE: 82 MMHG | RESPIRATION RATE: 18 BRPM | HEART RATE: 76 BPM | BODY MASS INDEX: 30.1 KG/M2 | TEMPERATURE: 97.7 F

## 2025-04-30 DIAGNOSIS — R42 DIZZINESS AND GIDDINESS: Primary | ICD-10-CM

## 2025-04-30 LAB — GLUCOSE SERPL-MCNC: 87 MG/DL (ref 65–140)

## 2025-04-30 PROCEDURE — 82948 REAGENT STRIP/BLOOD GLUCOSE: CPT | Performed by: PHYSICIAN ASSISTANT

## 2025-04-30 PROCEDURE — 99213 OFFICE O/P EST LOW 20 MIN: CPT | Performed by: PHYSICIAN ASSISTANT

## 2025-04-30 NOTE — TELEPHONE ENCOUNTER
Outreach made , vm has been left on phone to triage patient and see how she is currently feeling .     When patient calls back offer a in office appt if she would like to be seen in office verses ER visit .

## 2025-04-30 NOTE — PROGRESS NOTES
"  [facility]        NAME: Isabel Mcneil is a 34 y.o. female  : 1990    MRN: [id number]  DATE: 2025  TIME: 1:43 PM    Assessment and Plan   Dizziness and giddiness [R42]  1. Dizziness and giddiness  ECG 12 lead    Fingerstick Glucose (POCT)    Ambulatory Referral to Cardiology            Patient Instructions     Orthostatics were stable and normal during this encounter.    ECG revealed a normal sinus rhythm with sinus arrhythmia at a ventricular rate of 69 bpm.  Normal axis deviation is noted no ST segment elevation or depression.    Heart murmur on auscultation was discussed with patient.    GFS 87    Cardiology referral generated on your behalf    Patient was discharged home in no acute distress.    Follow up with PCP in 3-5 days.  Proceed to  ER if symptoms worsen.    Chief Complaint     Chief Complaint   Patient presents with    Dizziness     Dizziness started 2 weeks ago. Pt stated she passed out yesterday and today.          History of Present Illness       34-year-old female with past medical history of orthostatic hypotension, intermitting asthma, mood disorder presenting today reporting passing out yesterday and earlier today; she reports no LOC. Yesterday's lasted \"about a minute\" witnessed. Eyes started to tunnel. Today lasting 25 min. Also witnessed at work on both occasions.         Review of Systems   Review of Systems   Constitutional:  Negative for activity change, appetite change, diaphoresis and fever.   HENT:  Negative for congestion, ear pain, rhinorrhea and sore throat.    Eyes:  Negative for visual disturbance.   Respiratory:  Negative for cough.    Gastrointestinal:  Negative for abdominal pain, nausea and vomiting.   Musculoskeletal:  Negative for neck pain and neck stiffness.   Skin:  Negative for rash.   Neurological:  Positive for dizziness. Negative for weakness, light-headedness and headaches.         Current Medications       Current Outpatient Medications: "     ibuprofen (MOTRIN) 600 mg tablet, Take 1 tablet (600 mg total) by mouth 2 (two) times a day (Patient not taking: Reported on 2/24/2025), Disp: 60 tablet, Rfl: 0    pantoprazole (PROTONIX) 40 mg tablet, Take 1 tablet (40 mg total) by mouth daily (Patient not taking: Reported on 4/30/2025), Disp: 30 tablet, Rfl: 0    tobramycin (TOBREX) 0.3 % SOLN, Administer 1 drop to the right eye every 4 (four) hours while awake (Patient not taking: Reported on 2/24/2025), Disp: 5 mL, Rfl: 0    Current Allergies     Allergies as of 04/30/2025    (No Known Allergies)            The following portions of the patient's history were reviewed and updated as appropriate: allergies, current medications, past family history, past medical history, past social history, past surgical history and problem list.     Past Medical History:   Diagnosis Date    Asthma     childhood asthma  inhaler prn    Autism spectrum disorder     Depression     diag with depression/anxiety age 20 -no meds now    Migraine     diag 2 yrs ago- takes extra-strength tylenol    Termination of pregnancy (fetus)     Urinary tract infection     hx of  last episode 2018    Varicella     received vaccines       Past Surgical History:   Procedure Laterality Date    NO PAST SURGERIES      WISDOM TOOTH EXTRACTION  age 22       Family History   Problem Relation Age of Onset    Asthma Mother     Bipolar disorder Mother     Post-traumatic stress disorder Mother     Diabetes Mother     Anxiety disorder Father     Depression Sister     Drug abuse Sister     Kidney disease Sister     No Known Problems Daughter     Cancer Maternal Grandfather     No Known Problems Paternal Grandmother     No Known Problems Paternal Grandfather     Depression Maternal Uncle     Anxiety disorder Maternal Uncle     Diabetes Paternal Aunt     Breast cancer Family     Colon cancer Neg Hx     Ovarian cancer Neg Hx     Uterine cancer Neg Hx     Cervical cancer Neg Hx          Medications have been  verified.        Objective   /82   Pulse 76   Temp 97.7 °F (36.5 °C)   Resp 18   Wt 92.4 kg (203 lb 12.8 oz)   SpO2 100%   BMI 30.10 kg/m²        Physical Exam     Physical Exam  Vitals and nursing note reviewed.   Constitutional:       General: She is not in acute distress.     Appearance: Normal appearance. She is not ill-appearing, toxic-appearing or diaphoretic.   HENT:      Mouth/Throat:      Mouth: Mucous membranes are moist.      Pharynx: Oropharynx is clear.   Eyes:      General: No scleral icterus.     Extraocular Movements: Extraocular movements intact.      Conjunctiva/sclera: Conjunctivae normal.      Pupils: Pupils are equal, round, and reactive to light.   Cardiovascular:      Rate and Rhythm: Normal rate and regular rhythm.      Pulses: Normal pulses.      Heart sounds: Murmur (LSB, 4 ICS,) heard.   Pulmonary:      Effort: Pulmonary effort is normal. No respiratory distress.      Breath sounds: Normal breath sounds. No wheezing, rhonchi or rales.   Musculoskeletal:         General: Normal range of motion.      Cervical back: Normal range of motion and neck supple. No tenderness.   Lymphadenopathy:      Cervical: No cervical adenopathy.   Skin:     General: Skin is warm and dry.   Neurological:      General: No focal deficit present.      Mental Status: She is alert and oriented to person, place, and time.      Coordination: Coordination normal.      Gait: Gait normal.   Psychiatric:         Mood and Affect: Mood normal.         Behavior: Behavior normal.         Thought Content: Thought content normal.         Judgment: Judgment normal.

## 2025-04-30 NOTE — TELEPHONE ENCOUNTER
"FOLLOW UP: ED per PCP    REASON FOR CONVERSATION: Dizziness and Fall    SYMPTOMS: fainted x2 in 24hrs    OTHER: Pep warm tx pt to CTS, Pt reports she has fainted 2x in the last 24hrs. Pt denies headache, visual problems,and states she didn't hit her head. Staff saw pt fall at her work and states she was unconscious for 1 whole min. Pt denies chest pain, numbness/weakness at this time. Pt states she was dx with orthostatic hypotension. Pt states she's drinking fluids and is currently still at work.    Per protocol, Triage nurse advised pt to go to the ER. Pt refused and requested to see PCP. Triage nurse asked office what they wanted pt to do. Staff:Chiquis stated PCP is done for the day and Pt should go to the ER. Pt will call back and make a ER f/u appt after her ER visit.     PCP please be aware.     DISPOSITION: Go to ED/[facility] Now (Or to Office with PCP Approval)    Reason for Disposition   Fainted 2 times in one day    Answer Assessment - Initial Assessment Questions  1. ONSET: \"How long were you unconscious?\" (e.g., minutes, seconds) \"When did it happen?\"      2 WEEKS AGO DX WITH ORTHO STATIC HYPOTENSION  YES BUT PT COULD HEAR AND STAFF NEAR PT STATES 1 FULL MIN   2. CONTENT: \"What happened during the period of unconsciousness?\" (e.g., seizure activity)       Walked into room and passed out  3. MENTAL STATUS: \"Alert and oriented now?\" (e.g., oriented x 3 = name, month, location)       AX3   4. TRIGGER: \"What do you think caused the fainting?\" \"What were you doing just before you fainted?\"  (e.g., exercise, sudden standing up, prolonged standing)      DX:ORTHOSTATIC HYPO  5. RECURRENT SYMPTOM: \"Have you ever passed out before?\" If Yes, ask: \"When was the last time?\" and \"What happened that time?\"       YES  6. INJURY: \"Did you hurt yourself when you fell?\"       DENIES  7. CARDIAC SYMPTOMS: \"Have you had any of the following symptoms: chest pain, difficulty breathing, palpitations?\"      DENIES  8. NEUROLOGIC " "SYMPTOMS: \"Have you had any of the following symptoms: headache, numbness, vertigo, weakness?\"      Denies  9. GI SYMPTOMS: \"Have you had any of the following symptoms: abdomen pain, vomiting, diarrhea, blood in stools?\"      DENIES  10. OTHER SYMPTOMS: \"Do you have any other symptoms?\"        Denies  11. PREGNANCY: \"Is there any chance you are pregnant?\" \"When was your last menstrual period?\"        ON PERIOD NOW    Protocols used: Fainting-Adult-[place]    "

## 2025-04-30 NOTE — PATIENT INSTRUCTIONS
Patient Education    Orthostatics were stable and normal during this encounter.    ECG revealed a normal sinus rhythm with sinus arrhythmia at a ventricular rate of 69 bpm.  Normal axis deviation is noted no ST segment elevation or depression.    GFS 87    Cardiology referral generated on your behalf    Patient was discharged home in no acute distress.    Follow up with PCP in 3-5 days.  Proceed to  ER if symptoms worsen.    If tests are performed, our office will contact you with results only if changes need to made to the care plan discussed with you at the visit. You can review your full results on [facility] Mychart.     Dizziness, Nonvertigo, Discharge Instructions   About this topic   Dizziness means different things to different people. For example, you may feel light-headed or like you are about to pass out. You may have trouble walking straight and feel like you are about to fall when you are dizzy. Some people feel as though they are spinning or the world around them is spinning. This spinning feeling is known as vertigo.  Many things can cause you to feel dizzy. Some are serious things like heart problems or a stroke. Less serious things, like getting up too quickly or not drinking enough fluids, can also cause you to feel dizzy. Some medicines can also cause you to feel dizzy.  You may feel dizzy for a short time and then it may go away. It may also last for a long time and disrupt your daily activities. Treatment for dizziness depends on the cause.     What care is needed at home?   Ask your doctor what you need to do when you go home. Make sure you ask questions if you do not understand what the doctor says.  Avoid changing your position too quickly. Take care when moving from sitting to standing.  Sit on the edge of the bed for a few minutes before you stand up. Start to walk slowly after you stand up.  Move your legs often if you need to sit or  one position for a long time.  Be sure to drink  enough fluids, even if you do not feel thirsty.  Sit or lie down right away if you feel faint or dizzy. Take extra care to protect yourself from falls. Avoid driving when dizzy. If you feel dizzy while driving, pull over right away.  If you must walk somewhere when you feel dizzy, you may need to use a cane or walker, or have another person help you so you don’t fall.  What follow-up care is needed?   Your doctor may ask you to make visits to the office to check on your progress. Be sure to keep these visits.  Your doctor may send you to a doctor who specializes in the brain, nerves, or heart to figure out the cause of your dizziness.  What drugs may be needed?   The doctor may order drugs to:  Treat the condition that causes dizziness  Decrease the feeling of dizziness  Will physical activity be limited?   Avoid activities that may cause dizzy spells.  Do not drive if you are feeling dizzy.  Do not use running machines if you are feeling dizzy.  What problems could happen?   Fall and hurt yourself  Get in a car accident  What can be done to prevent this health problem?   Drink lots of fluids each day.  Have your doctor review your drugs and try to decrease drugs that cause dizziness.  Use a blood pressure monitor.  Avoid drinking alcohol or using other drugs  When do I need to call the doctor?   You have new weakness in your arms or legs.  You develop new trouble speaking, swallowing, seeing, or hearing.  You have chest pain, an irregular heartbeat, or trouble breathing.  You have a seizure.  You become unable to walk or stand because of your dizziness.  Your dizziness continues for a long time or gets worse.  Helpful tips   If you are having dizzy spells:  Sit down in a chair and put your head between your knees.  Breathe deeply and slowly.  Stand up slowly.  Get some fresh air.  Teach Back: Helping You Understand   The Teach Back Method helps you understand the information we are giving you. After you talk with  the staff, tell them in your own words what you learned. This helps to make sure the staff has described each thing clearly. It also helps to explain things that may have been confusing. Before going home, make sure you can do these:  I can tell you about my condition.  I can tell you what I will do to help me stay safe when moving about.  I can tell you what I will do if I have problems talking or moving.  Last Reviewed Date   2021-06-10  Consumer Information Use and Disclaimer   This generalized information is a limited summary of diagnosis, treatment, and/or medication information. It is not meant to be comprehensive and should be used as a tool to help the user understand and/or assess potential diagnostic and treatment options. It does NOT include all information about conditions, treatments, medications, side effects, or risks that may apply to a specific patient. It is not intended to be medical advice or a substitute for the medical advice, diagnosis, or treatment of a health care provider based on the health care provider's examination and assessment of a patient’s specific and unique circumstances. Patients must speak with a health care provider for complete information about their health, medical questions, and treatment options, including any risks or benefits regarding use of medications. This information does not endorse any treatments or medications as safe, effective, or approved for treating a specific patient. [employer], Inc[employer] and its affiliates disclaim any warranty or liability relating to this information or the use thereof. The use of this information is governed by the Terms of Use, available at https://www.[employer].com/en/know/clinical-effectiveness-terms   Copyright   Copyright © 2024 [employer], Inc[employer] and its affiliates and/or licensors. All rights reserved.

## 2025-04-30 NOTE — TELEPHONE ENCOUNTER
Pt FERNANDO - I offered same day appt  @ 3:45 PM w/Dr Dawkins, pt declined to schedule at this time, pt states she won't be able to make it, she has to p/u her kids in the PM. No other openings at 1581/1619 at this moment.     I advised pt to go to the ER/ UC. Pt states she would rather go to the U/C, pt voiced verbal understanding.

## 2025-04-30 NOTE — LETTER
April 30, 2025     Patient: Isabel Mcneil   YOB: 1990   Date of Visit: 4/30/2025       To Whom it May Concern:    Isabel Mcneil was seen in my clinic on 4/30/2025. She may return to work on 5/1/2025 .    If you have any questions or concerns, please don't hesitate to call.         Sincerely,          Brandon Elizabeth PA-C        CC: No Recipients

## 2025-05-02 ENCOUNTER — OFFICE VISIT (OUTPATIENT)
Dept: FAMILY MEDICINE CLINIC | Facility: CLINIC | Age: 35
End: 2025-05-02
Payer: COMMERCIAL

## 2025-05-02 ENCOUNTER — TELEPHONE (OUTPATIENT)
Dept: FAMILY MEDICINE CLINIC | Facility: CLINIC | Age: 35
End: 2025-05-02

## 2025-05-02 ENCOUNTER — RESULTS FOLLOW-UP (OUTPATIENT)
Dept: FAMILY MEDICINE CLINIC | Facility: CLINIC | Age: 35
End: 2025-05-02

## 2025-05-02 ENCOUNTER — APPOINTMENT (OUTPATIENT)
Dept: LAB | Facility: HOSPITAL | Age: 35
DRG: 204 | End: 2025-05-02
Payer: COMMERCIAL

## 2025-05-02 VITALS
WEIGHT: 200 LBS | BODY MASS INDEX: 29.62 KG/M2 | DIASTOLIC BLOOD PRESSURE: 72 MMHG | HEART RATE: 71 BPM | SYSTOLIC BLOOD PRESSURE: 112 MMHG | OXYGEN SATURATION: 100 % | TEMPERATURE: 97.8 F | HEIGHT: 69 IN

## 2025-05-02 DIAGNOSIS — R42 DIZZINESS: ICD-10-CM

## 2025-05-02 DIAGNOSIS — Z00.00 ANNUAL PHYSICAL EXAM: Primary | ICD-10-CM

## 2025-05-02 DIAGNOSIS — F33.1 MODERATE RECURRENT MAJOR DEPRESSION (HCC): ICD-10-CM

## 2025-05-02 DIAGNOSIS — Z00.00 ANNUAL PHYSICAL EXAM: ICD-10-CM

## 2025-05-02 LAB
ALBUMIN SERPL BCG-MCNC: 4.3 G/DL (ref 3.5–5)
ALP SERPL-CCNC: 106 U/L (ref 34–104)
ALT SERPL W P-5'-P-CCNC: 8 U/L (ref 7–52)
ANION GAP SERPL CALCULATED.3IONS-SCNC: 4 MMOL/L (ref 4–13)
AST SERPL W P-5'-P-CCNC: 13 U/L (ref 13–39)
BASOPHILS # BLD AUTO: 0.07 THOUSANDS/ÂΜL (ref 0–0.1)
BASOPHILS NFR BLD AUTO: 1 % (ref 0–1)
BILIRUB SERPL-MCNC: 0.53 MG/DL (ref 0.2–1)
BUN SERPL-MCNC: 15 MG/DL (ref 5–25)
CALCIUM SERPL-MCNC: 9.4 MG/DL (ref 8.4–10.2)
CHLORIDE SERPL-SCNC: 106 MMOL/L (ref 96–108)
CHOLEST SERPL-MCNC: 187 MG/DL (ref ?–200)
CO2 SERPL-SCNC: 30 MMOL/L (ref 21–32)
CREAT SERPL-MCNC: 0.86 MG/DL (ref 0.6–1.3)
EOSINOPHIL # BLD AUTO: 0.09 THOUSAND/ÂΜL (ref 0–0.61)
EOSINOPHIL NFR BLD AUTO: 2 % (ref 0–6)
ERYTHROCYTE [DISTWIDTH] IN BLOOD BY AUTOMATED COUNT: 13.2 % (ref 11.6–15.1)
EST. AVERAGE GLUCOSE BLD GHB EST-MCNC: 105 MG/DL
GFR SERPL CREATININE-BSD FRML MDRD: 89 ML/MIN/1.73SQ M
GLUCOSE P FAST SERPL-MCNC: 89 MG/DL (ref 65–99)
HBA1C MFR BLD: 5.3 %
HCT VFR BLD AUTO: 39.9 % (ref 34.8–46.1)
HDLC SERPL-MCNC: 86 MG/DL
HGB BLD-MCNC: 12.6 G/DL (ref 11.5–15.4)
IMM GRANULOCYTES # BLD AUTO: 0.01 THOUSAND/UL (ref 0–0.2)
IMM GRANULOCYTES NFR BLD AUTO: 0 % (ref 0–2)
LDLC SERPL CALC-MCNC: 90 MG/DL (ref 0–100)
LYMPHOCYTES # BLD AUTO: 2.22 THOUSANDS/ÂΜL (ref 0.6–4.47)
LYMPHOCYTES NFR BLD AUTO: 45 % (ref 14–44)
MCH RBC QN AUTO: 27.9 PG (ref 26.8–34.3)
MCHC RBC AUTO-ENTMCNC: 31.6 G/DL (ref 31.4–37.4)
MCV RBC AUTO: 89 FL (ref 82–98)
MONOCYTES # BLD AUTO: 0.32 THOUSAND/ÂΜL (ref 0.17–1.22)
MONOCYTES NFR BLD AUTO: 6 % (ref 4–12)
NEUTROPHILS # BLD AUTO: 2.26 THOUSANDS/ÂΜL (ref 1.85–7.62)
NEUTS SEG NFR BLD AUTO: 46 % (ref 43–75)
NONHDLC SERPL-MCNC: 101 MG/DL
NRBC BLD AUTO-RTO: 0 /100 WBCS
PLATELET # BLD AUTO: 286 THOUSANDS/UL (ref 149–390)
PMV BLD AUTO: 9.2 FL (ref 8.9–12.7)
POTASSIUM SERPL-SCNC: 4.3 MMOL/L (ref 3.5–5.3)
PROT SERPL-MCNC: 6.7 G/DL (ref 6.4–8.4)
RBC # BLD AUTO: 4.51 MILLION/UL (ref 3.81–5.12)
SODIUM SERPL-SCNC: 140 MMOL/L (ref 135–147)
TRIGL SERPL-MCNC: 54 MG/DL (ref ?–150)
TSH SERPL DL<=0.05 MIU/L-ACNC: 1.25 UIU/ML (ref 0.45–4.5)
WBC # BLD AUTO: 4.97 THOUSAND/UL (ref 4.31–10.16)

## 2025-05-02 PROCEDURE — 83036 HEMOGLOBIN GLYCOSYLATED A1C: CPT

## 2025-05-02 PROCEDURE — 80053 COMPREHEN METABOLIC PANEL: CPT

## 2025-05-02 PROCEDURE — 99395 PREV VISIT EST AGE 18-39: CPT

## 2025-05-02 PROCEDURE — 85025 COMPLETE CBC W/AUTO DIFF WBC: CPT

## 2025-05-02 PROCEDURE — 99214 OFFICE O/P EST MOD 30 MIN: CPT

## 2025-05-02 PROCEDURE — 80061 LIPID PANEL: CPT

## 2025-05-02 PROCEDURE — 36415 COLL VENOUS BLD VENIPUNCTURE: CPT

## 2025-05-02 PROCEDURE — 84443 ASSAY THYROID STIM HORMONE: CPT

## 2025-05-02 RX ORDER — SERTRALINE HYDROCHLORIDE 25 MG/1
25 TABLET, FILM COATED ORAL DAILY
Qty: 30 TABLET | Refills: 2 | Status: SHIPPED | OUTPATIENT
Start: 2025-05-02

## 2025-05-02 NOTE — PATIENT INSTRUCTIONS
"Patient Education     Routine physical for adults   The Basics   Written by the doctors and editors at [facility]   What is a physical? -- A physical is a routine visit, or \"check-up,\" with your doctor. You might also hear it called a \"wellness visit\" or \"preventive visit.\"  During each visit, the doctor will:   Ask about your physical and mental health   Ask about your habits, behaviors, and lifestyle   Do an exam   Give you vaccines if needed   Talk to you about any medicines you take   Give advice about your health   Answer your questions  Getting regular check-ups is an important part of taking care of your health. It can help your doctor find and treat any problems you have. But it's also important for preventing health problems.  A routine physical is different from a \"sick visit.\" A sick visit is when you see a doctor because of a health concern or problem. Since physicals are scheduled ahead of time, you can think about what you want to ask the doctor.  How often should I get a physical? -- It depends on your age and health. In general, for people age 21 years and older:   If you are younger than 50 years, you might be able to get a physical every 3 years.   If you are 50 years or older, your doctor might recommend a physical every year.  If you have an ongoing health condition, like diabetes or high blood pressure, your doctor will probably want to see you more often.  What happens during a physical? -- In general, each visit will include:   Physical exam - The doctor or nurse will check your height, weight, heart rate, and blood pressure. They will also look at your eyes and ears. They will ask about how you are feeling and whether you have any symptoms that bother you.   Medicines - It's a good idea to bring a list of all the medicines you take to each doctor visit. Your doctor will talk to you about your medicines and answer any questions. Tell them if you are having any side effects that bother you. You " "should also tell them if you are having trouble paying for any of your medicines.   Habits and behaviors - This includes:   Your diet   Your exercise habits   Whether you smoke, drink alcohol, or use drugs   Whether you are sexually active   Whether you feel safe at home  Your doctor will talk to you about things you can do to improve your health and lower your risk of health problems. They will also offer help and support. For example, if you want to quit smoking, they can give you advice and might prescribe medicines. If you want to improve your diet or get more physical activity, they can help you with this, too.   Lab tests, if needed - The tests you get will depend on your age and situation. For example, your doctor might want to check your:   Cholesterol   Blood sugar   Iron level   Vaccines - The recommended vaccines will depend on your age, health, and what vaccines you already had. Vaccines are very important because they can prevent certain serious or deadly infections.   Discussion of screening - \"Screening\" means checking for diseases or other health problems before they cause symptoms. Your doctor can recommend screening based on your age, risk, and preferences. This might include tests to check for:   Cancer, such as breast, prostate, cervical, ovarian, colorectal, prostate, lung, or skin cancer   Sexually transmitted infections, such as chlamydia and gonorrhea   Mental health conditions like depression and anxiety  Your doctor will talk to you about the different types of screening tests. They can help you decide which screenings to have. They can also explain what the results might mean.   Answering questions - The physical is a good time to ask the doctor or nurse questions about your health. If needed, they can refer you to other doctors or specialists, too.  Adults older than 65 years often need other care, too. As you get older, your doctor will talk to you about:   How to prevent falling at " home   Hearing or vision tests   Memory testing   How to take your medicines safely   Making sure that you have the help and support you need at home  All topics are updated as new evidence becomes available and our peer review process is complete.  This topic retrieved from [employer] on: May 02, 2024.  Topic 771339 Version 1.0  Release: 32.4.3 - C32.122  © 2024 UpToDate, Inc. and/or its affiliates. All rights reserved.  Consumer Information Use and Disclaimer   Disclaimer: This generalized information is a limited summary of diagnosis, treatment, and/or medication information. It is not meant to be comprehensive and should be used as a tool to help the user understand and/or assess potential diagnostic and treatment options. It does NOT include all information about conditions, treatments, medications, side effects, or risks that may apply to a specific patient. It is not intended to be medical advice or a substitute for the medical advice, diagnosis, or treatment of a health care provider based on the health care provider's examination and assessment of a patient's specific and unique circumstances. Patients must speak with a health care provider for complete information about their health, medical questions, and treatment options, including any risks or benefits regarding use of medications. This information does not endorse any treatments or medications as safe, effective, or approved for treating a specific patient. UpToDate, Inc. and its affiliates disclaim any warranty or liability relating to this information or the use thereof.The use of this information is governed by the Terms of Use, available at https://www.woltersEvaporcooluwer.com/en/know/clinical-effectiveness-terms. 2024© UpToDate, Inc. and its affiliates and/or licensors. All rights reserved.  Copyright   © 2024 UpToDate, Inc. and/or its affiliates. All rights reserved.

## 2025-05-02 NOTE — TELEPHONE ENCOUNTER
Pt was handed the preventive vs problem visit explanation.    Completed ppwrk scanned to chart. Handed [employer].     Pt was notified to reach out to health insurance to remove current PCP on file

## 2025-05-02 NOTE — PROGRESS NOTES
Adult Annual Physical  Name: Isabel Mcneil      : 1990      MRN: [id number]  Encounter Provider: Sarah Singh PA-C  Encounter Date: 2025   Encounter department: [facility] [address]    :  Assessment & Plan  Annual physical exam  -pt due for annual physical  -routine labs ordered   Orders:    CBC and differential; Future    Comprehensive metabolic panel; Future    Lipid panel; Future    TSH, 3rd generation with Free T4 reflex; Future    Moderate recurrent major depression (HCC)  Depression Screening Follow-up Plan: Patient's depression screening was positive with a PHQ-9 score of 12. Patient assessed for underlying major depression. They have no active suicidal ideations. Brief counseling provided and recommend additional follow-up/re-evaluation next office visit.    -Pt notes she has had depression since teenage years, has been on/off SSRI (Zoloft and Lexapro). Medications are helpful and then she slowly tapers off   -Interested in restarting medication. Pt currently breastfeeding and working on weaning off, recommending Zoloft 25 mg daily  -declines referral to talk therapy   -Denies thoughts of hurting herself or anyone else   -Ordered Zoloft 25 mg daily and f/u 1 month     Orders:    sertraline (Zoloft) 25 mg tablet; Take 1 tablet (25 mg total) by mouth daily    Dizziness  -experiencing episodes of dizziness x 2 years, dx with orthostatic hypotension.   -She notes in past 2 weeks, symptoms have been worsening. She has passed out 2 times.  - She notes she stood up, got dizzy, sat down and passed out for less than a minute. In work, she notes dizziness came out of nowhere. She passed out in office for less than 45 seconds. -She notes during these episodes she gets tunnel vision, sweaty. She is able to remember the event, she is able to hear and feel during the event. Denies seizure like activity. Denies hitting head, head injuries. She notes she can feel when  the episodes are coming on. She notes she can feel the episodes coming on, she notes tunnel vision comes first. She notes her heart feels like it is pounding before passing out  -She notes she drinks 48 oz of water in a day. She notes she has tried to increase salt, but this does not always happen. She notes she does not eat in the morning and these events occur after not eating.   -She was seen at  for this 4/20/2025,  they noted murmur on exam and had EKG done.   -EKG revealed a normal sinus rhythm with sinus arrhythmia at a ventricular rate of 69 bpm.  Normal axis deviation is noted no ST segment elevation or depression.   -On exam, murmur heard 4th ICS along LSB. Cardiology appt 7/22/2025  -Discussed orthostatic hypotension, increasing water/salt intake, compression socks, slow position changes and reporting to ED after any syncopal event  -Note for work completed and scanned into media.  -Ordered CBC, CMP, TSH, ECHO, and Holter monitor, and A1C     Orders:    CBC and differential; Future    Comprehensive metabolic panel; Future    TSH, 3rd generation with Free T4 reflex; Future    Echo complete w/ contrast if indicated; Future    Holter monitor; Future    Hemoglobin A1C; Future                 Preventive Screenings:  - Diabetes Screening: orders placed  - Cholesterol Screening: orders placed   - Hepatitis C screening: screening up-to-date   - HIV screening: screening up-to-date   - Cervical cancer screening: screening up-to-date   - Lung cancer screening: screening not indicated     Immunizations:  - Immunizations due: Prevnar 20         History of Present Illness     Isabel presents to the office for annual physical and note for work.     She has been experiencing episodes of dizziness x 2 years. She was dx with orthostatic hypotension. She notes in past 2 weeks, symptoms have been worsening. She has passed out X times. She notes she stood up, got dizzy, sat down and passed out for less than a minute. In  work, she notes dizziness came out of nowhere. She passed out in office for less than 45 seconds. She notes during these episodes she gets tunnel vision, sweaty. She is able to remember the event, she is able to hear and feel during the event. Denies seizure like activity. Denies hitting head, head injuries. She notes she can feel when the episodes are coming on. She notes she can feel the episodes coming on, she notes tunnel vision comes first. She notes her heart feels like it is pounding before passing out    Denies chest pain, SOB, hx epilepsy.     She notes she drinks 48 oz of water in a day. She notes she has tried to increase salt, but this does not always happen. She notes she does not eat in the morning and these events occur after not eating.     She was seen at  for this 4/20/2025,  they noted murmur on exam and had EKG done.         Adult Annual Physical:  Patient presents for annual physical.     Diet and Physical Activity:  - Diet/Nutrition: well balanced diet.  - Exercise: walking and 5-7 times a week on average.    Depression Screening:    - PHQ-9 Score: 12    General Health:  - Sleep: sleeps poorly. 5-6 hours on average. Difficulty falling asleep  - Hearing: normal hearing bilateral ears.  - Vision: no vision problems.  - Dental: regular dental visits, does not brush teeth regularly and does not floss.    /GYN Health:  - Follows with GYN: yes.   - Menopause: premenopausal.   - Last menstrual cycle: 4/28/2025.   - History of STDs: no    Review of Systems   Constitutional:  Negative for activity change, appetite change, fatigue and fever.   HENT:  Negative for congestion, ear pain, rhinorrhea and sore throat.    Eyes:  Negative for pain.   Respiratory:  Negative for cough and shortness of breath.    Cardiovascular:  Negative for chest pain and leg swelling.   Gastrointestinal:  Negative for abdominal distention, abdominal pain, constipation, diarrhea, nausea and vomiting.   Genitourinary:   "Negative for dysuria, frequency and urgency.   Musculoskeletal:  Negative for gait problem.   Skin:  Negative for rash.   Neurological:  Positive for light-headedness. Negative for dizziness and headaches.         Objective   /72 (Patient Position: Sitting, Cuff Size: Standard)   Pulse 71   Temp 97.8 °F (36.6 °C)   Ht 5' 9\" (1.753 m)   Wt 90.7 kg (200 lb)   SpO2 100%   BMI 29.53 kg/m²     Physical Exam  Vitals reviewed.   Constitutional:       General: She is not in acute distress.     Appearance: Normal appearance.   HENT:      Head: Normocephalic and atraumatic.      Right Ear: Tympanic membrane, ear canal and external ear normal.      Left Ear: Tympanic membrane, ear canal and external ear normal.      Nose: Nose normal. No congestion.      Right Sinus: No maxillary sinus tenderness or frontal sinus tenderness.      Left Sinus: No maxillary sinus tenderness or frontal sinus tenderness.      Mouth/Throat:      Mouth: Mucous membranes are moist.      Pharynx: Oropharynx is clear.   Eyes:      Extraocular Movements: Extraocular movements intact.      Conjunctiva/sclera: Conjunctivae normal.   Cardiovascular:      Rate and Rhythm: Normal rate and regular rhythm.      Heart sounds: Murmur heard.   Pulmonary:      Effort: Pulmonary effort is normal.      Breath sounds: Normal breath sounds. No wheezing, rhonchi or rales.   Abdominal:      General: Bowel sounds are normal. There is no distension.      Palpations: Abdomen is soft.      Tenderness: There is no abdominal tenderness. There is no right CVA tenderness or left CVA tenderness.   Musculoskeletal:      Cervical back: Neck supple.      Right lower leg: No edema.      Left lower leg: No edema.   Lymphadenopathy:      Cervical: No cervical adenopathy.   Skin:     General: Skin is warm.      Capillary Refill: Capillary refill takes less than 2 seconds.      Findings: No rash.   Neurological:      Mental Status: She is alert. Mental status is at baseline. "           Sraah Singh PA-C  [facility]  5/2/2025 9:01 AM

## 2025-05-03 ENCOUNTER — HOSPITAL ENCOUNTER (INPATIENT)
Facility: HOSPITAL | Age: 35
LOS: 1 days | Discharge: HOME | DRG: 204 | End: 2025-05-04
Attending: EMERGENCY MEDICINE | Admitting: STUDENT IN AN ORGANIZED HEALTH CARE EDUCATION/TRAINING PROGRAM
Payer: COMMERCIAL

## 2025-05-03 ENCOUNTER — APPOINTMENT (EMERGENCY)
Dept: RADIOLOGY | Facility: HOSPITAL | Age: 35
DRG: 204 | End: 2025-05-03
Payer: COMMERCIAL

## 2025-05-03 DIAGNOSIS — R55 SYNCOPE AND COLLAPSE: Primary | ICD-10-CM

## 2025-05-03 LAB
ALBUMIN SERPL BCG-MCNC: 4 G/DL (ref 3.5–5)
ALP SERPL-CCNC: 99 U/L (ref 34–104)
ALT SERPL W P-5'-P-CCNC: 8 U/L (ref 7–52)
ANION GAP SERPL CALCULATED.3IONS-SCNC: 5 MMOL/L (ref 4–13)
AST SERPL W P-5'-P-CCNC: 12 U/L (ref 13–39)
BASOPHILS # BLD AUTO: 0.06 THOUSANDS/ÂΜL (ref 0–0.1)
BASOPHILS NFR BLD AUTO: 1 % (ref 0–1)
BILIRUB DIRECT SERPL-MCNC: 0.08 MG/DL (ref 0–0.2)
BILIRUB SERPL-MCNC: 0.28 MG/DL (ref 0.2–1)
BUN SERPL-MCNC: 15 MG/DL (ref 5–25)
CALCIUM SERPL-MCNC: 9.3 MG/DL (ref 8.4–10.2)
CARDIAC TROPONIN I PNL SERPL HS: <2 NG/L (ref ?–50)
CHLORIDE SERPL-SCNC: 107 MMOL/L (ref 96–108)
CO2 SERPL-SCNC: 28 MMOL/L (ref 21–32)
CREAT SERPL-MCNC: 0.8 MG/DL (ref 0.6–1.3)
D DIMER PPP FEU-MCNC: <0.27 UG/ML FEU
EOSINOPHIL # BLD AUTO: 0.11 THOUSAND/ÂΜL (ref 0–0.61)
EOSINOPHIL NFR BLD AUTO: 2 % (ref 0–6)
ERYTHROCYTE [DISTWIDTH] IN BLOOD BY AUTOMATED COUNT: 13.2 % (ref 11.6–15.1)
GFR SERPL CREATININE-BSD FRML MDRD: 97 ML/MIN/1.73SQ M
GLUCOSE SERPL-MCNC: 96 MG/DL (ref 65–140)
HCG SERPL QL: NEGATIVE
HCT VFR BLD AUTO: 38.9 % (ref 34.8–46.1)
HGB BLD-MCNC: 12.5 G/DL (ref 11.5–15.4)
IMM GRANULOCYTES # BLD AUTO: 0.01 THOUSAND/UL (ref 0–0.2)
IMM GRANULOCYTES NFR BLD AUTO: 0 % (ref 0–2)
LYMPHOCYTES # BLD AUTO: 3.35 THOUSANDS/ÂΜL (ref 0.6–4.47)
LYMPHOCYTES NFR BLD AUTO: 45 % (ref 14–44)
MAGNESIUM SERPL-MCNC: 2 MG/DL (ref 1.9–2.7)
MCH RBC QN AUTO: 28.2 PG (ref 26.8–34.3)
MCHC RBC AUTO-ENTMCNC: 32.1 G/DL (ref 31.4–37.4)
MCV RBC AUTO: 88 FL (ref 82–98)
MONOCYTES # BLD AUTO: 0.4 THOUSAND/ÂΜL (ref 0.17–1.22)
MONOCYTES NFR BLD AUTO: 5 % (ref 4–12)
NEUTROPHILS # BLD AUTO: 3.55 THOUSANDS/ÂΜL (ref 1.85–7.62)
NEUTS SEG NFR BLD AUTO: 47 % (ref 43–75)
NRBC BLD AUTO-RTO: 0 /100 WBCS
PLATELET # BLD AUTO: 329 THOUSANDS/UL (ref 149–390)
PMV BLD AUTO: 9 FL (ref 8.9–12.7)
POTASSIUM SERPL-SCNC: 3.8 MMOL/L (ref 3.5–5.3)
PROT SERPL-MCNC: 6.5 G/DL (ref 6.4–8.4)
RBC # BLD AUTO: 4.44 MILLION/UL (ref 3.81–5.12)
SODIUM SERPL-SCNC: 140 MMOL/L (ref 135–147)
TSH SERPL DL<=0.05 MIU/L-ACNC: 1.06 UIU/ML (ref 0.45–4.5)
WBC # BLD AUTO: 7.48 THOUSAND/UL (ref 4.31–10.16)

## 2025-05-03 PROCEDURE — 93005 ELECTROCARDIOGRAM TRACING: CPT

## 2025-05-03 PROCEDURE — 99284 EMERGENCY DEPT VISIT MOD MDM: CPT

## 2025-05-03 PROCEDURE — 99285 EMERGENCY DEPT VISIT HI MDM: CPT | Performed by: EMERGENCY MEDICINE

## 2025-05-03 PROCEDURE — 84703 CHORIONIC GONADOTROPIN ASSAY: CPT | Performed by: EMERGENCY MEDICINE

## 2025-05-03 PROCEDURE — 36415 COLL VENOUS BLD VENIPUNCTURE: CPT | Performed by: EMERGENCY MEDICINE

## 2025-05-03 PROCEDURE — 80048 BASIC METABOLIC PNL TOTAL CA: CPT | Performed by: EMERGENCY MEDICINE

## 2025-05-03 PROCEDURE — 71046 X-RAY EXAM CHEST 2 VIEWS: CPT

## 2025-05-03 PROCEDURE — 85379 FIBRIN DEGRADATION QUANT: CPT | Performed by: EMERGENCY MEDICINE

## 2025-05-03 PROCEDURE — 83735 ASSAY OF MAGNESIUM: CPT | Performed by: EMERGENCY MEDICINE

## 2025-05-03 PROCEDURE — 85025 COMPLETE CBC W/AUTO DIFF WBC: CPT | Performed by: EMERGENCY MEDICINE

## 2025-05-03 PROCEDURE — 84443 ASSAY THYROID STIM HORMONE: CPT | Performed by: EMERGENCY MEDICINE

## 2025-05-03 PROCEDURE — 80076 HEPATIC FUNCTION PANEL: CPT | Performed by: EMERGENCY MEDICINE

## 2025-05-03 PROCEDURE — 99222 1ST HOSP IP/OBS MODERATE 55: CPT | Performed by: STUDENT IN AN ORGANIZED HEALTH CARE EDUCATION/TRAINING PROGRAM

## 2025-05-03 PROCEDURE — 84484 ASSAY OF TROPONIN QUANT: CPT | Performed by: EMERGENCY MEDICINE

## 2025-05-03 PROCEDURE — 96360 HYDRATION IV INFUSION INIT: CPT

## 2025-05-03 RX ORDER — ENOXAPARIN SODIUM 100 MG/ML
40 INJECTION SUBCUTANEOUS DAILY
Status: DISCONTINUED | OUTPATIENT
Start: 2025-05-04 | End: 2025-05-04 | Stop reason: HOSPADM

## 2025-05-03 RX ADMIN — SODIUM CHLORIDE 1000 ML: 0.9 INJECTION, SOLUTION INTRAVENOUS at 19:16

## 2025-05-03 NOTE — ED PROVIDER NOTES
Time reflects when diagnosis was documented in both MDM as applicable and the Disposition within this note       Time User Action Codes Description Comment    5/3/2025  8:46 PM Na Canales Add [R55] Syncope and collapse     5/3/2025  8:46 PM Na Canales Modify [R55] Syncope and collapse Multiple syncopal episodes          ED Disposition       ED Disposition   Admit    Condition   Stable    Date/Time   Sat May 3, 2025  8:46 PM    Comment   Case was discussed with JEN and the patient's admission status was agreed to be Admission Status: inpatient status to the service of Dr. Wilburn.               Assessment & Plan       Medical Decision Making  34-year-old female presents to the ED for recurrent syncope over the past 5 days.  Patient states she has passed out at least once each day for the past 5 days.  It does appear that it is likely positional and related to orthostatic hypotension however now the syncopal episodes are happening in a seated position.  Other than orthostatic hypotension, other considerations include arrhythmia, neurogenic syncope, valvular abnormality or other cardiac abnormality, hydration, electrolyte abnormality.  Will check cardiac labs, D-dimer, orthostatic vital signs.  Will provide 1 L IV fluid hydration.    Amount and/or Complexity of Data Reviewed  Labs: ordered. Decision-making details documented in ED Course.  Radiology: ordered and independent interpretation performed. Decision-making details documented in ED Course.  ECG/medicine tests: ordered and independent interpretation performed. Decision-making details documented in ED Course.    Risk  Decision regarding hospitalization.        ED Course as of 05/03/25 2048   Sat May 03, 2025   1931 Orthostatic vital signs reviewed and no hypotension or significant tachycardia.   1948 hs TnI 0hr: <2   2012 D-Dimer, Quant: <0.27   2029 Updated patient about essentially normal workup.  Patient still feeling lightheaded even while sitting.   "She states she is unable to shake this feeling of lightheadedness.  I did offer observation admission to see if she can get expedited cardiology workup but she does not have an appointment till late July.  Patient to call family to see if she can make arrangements for her children and will get back to me.   2039 Patient was able to make arrangements for her children and is agreeable to admission.  Will discuss with internal medicine.       Medications   sodium chloride 0.9 % bolus 1,000 mL (0 mL Intravenous Stopped 5/3/25 2016)       ED Risk Strat Scores                    No data recorded        SBIRT 22yo+      Flowsheet Row Most Recent Value   Initial Alcohol Screen: US AUDIT-C     1. How often do you have a drink containing alcohol? 0 Filed at: 05/03/2025 1740   2. How many drinks containing alcohol do you have on a typical day you are drinking?  0 Filed at: 05/03/2025 1740   3b. FEMALE Any Age, or MALE 65+: How often do you have 4 or more drinks on one occassion? 0 Filed at: 05/03/2025 1740   Audit-C Score 0 Filed at: 05/03/2025 1740   DONY: How many times in the past year have you...    Used an illegal drug or used a prescription medication for non-medical reasons? Never Filed at: 05/03/2025 1740                            History of Present Illness       Chief Complaint   Patient presents with    Syncope     Patient reports \"I been having episodes of passing out, my heart feels like its beating out my chest, and I feel light headed.\" Symptoms started 1 week ago.Denies N/V.        Past Medical History:   Diagnosis Date    Asthma     childhood asthma  inhaler prn    Autism spectrum disorder     Depression     diag with depression/anxiety age 20 -no meds now    Migraine     diag 2 yrs ago- takes extra-strength tylenol    Termination of pregnancy (fetus)     Urinary tract infection     hx of  last episode 2018    Varicella     received vaccines      Past Surgical History:   Procedure Laterality Date    NO PAST " SURGERIES      WISDOM TOOTH EXTRACTION  age 22      Family History   Problem Relation Age of Onset    Asthma Mother     Bipolar disorder Mother     Post-traumatic stress disorder Mother     Diabetes Mother     Anxiety disorder Father     Depression Sister     Drug abuse Sister     Kidney disease Sister     No Known Problems Daughter     Cancer Maternal Grandfather     No Known Problems Paternal Grandmother     No Known Problems Paternal Grandfather     Depression Maternal Uncle     Anxiety disorder Maternal Uncle     Diabetes Paternal Aunt     Breast cancer Family     Colon cancer Neg Hx     Ovarian cancer Neg Hx     Uterine cancer Neg Hx     Cervical cancer Neg Hx       Social History     Tobacco Use    Smoking status: Every Day     Current packs/day: 0.00     Average packs/day: 0.5 packs/day for 10.0 years (5.0 ttl pk-yrs)     Types: Cigarettes     Start date: 10/1/2008     Last attempt to quit: 10/1/2018     Years since quittin.5    Smokeless tobacco: Never    Tobacco comments:     smokes 5 cigarette per day   Vaping Use    Vaping status: Never Used   Substance Use Topics    Alcohol use: Not Currently     Comment: occasional-none since pregnancy    Drug use: Yes     Types: Marijuana     Comment: occasionally 2x/month      E-Cigarette/Vaping    E-Cigarette Use Never User       E-Cigarette/Vaping Substances      I have reviewed and agree with the history as documented.     Patient is a 34-year-old female with prior diagnosis of orthostatic hypotension many years ago, childhood asthma, depression, anxiety, migraines, presents to the emergency department complaining of multiple syncopal episodes in the past week.  Patient states for the past 5 days she has passed out at least once each day.  She states initially it would happen when she would be sitting and then stood up and she would feel lightheaded followed by tunnel vision and getting sweaty and then she would pass out.  She states now it is happening even in  the seated position but she is still getting the presyncopal lightheadedness and tunnel vision.  She reports she did recently see her PCP who ordered outpatient blood work.  She has an appointment with cardiology but it is not until late July.  She denies ever passing out before.  She does report occasionally having palpitations in which she feels her heart racing prior to passing out.  She denies any other presyncopal symptoms such as chest pain, dyspnea, abdominal pain, nausea, vomiting, recent diarrhea.  She denies any UTI symptoms, leg pain or swelling, focal neurologic deficits.  She denies any head strike from any of the passing out episodes.  She reports mild neck pain but denies any significant back pain.  Denies recent fevers or chills, cough or URI symptoms.  Patient states that when she does lose consciousness it last about 45 seconds to 1 minute at max.  No seizure-like activity reported.  She denies any change in her diet or fluid intake.  She states she recently ended her menstrual cycle and denies that it was heavier than normal.      History provided by:  Patient   used: No    Syncope  Associated symptoms: no chest pain, no confusion, no dizziness, no fever, no headaches, no nausea, no palpitations, no shortness of breath, no vomiting and no weakness        Review of Systems   Constitutional:  Negative for chills and fever.   HENT:  Negative for congestion, ear pain, rhinorrhea and sore throat.    Eyes:  Negative for photophobia, pain and visual disturbance.   Respiratory:  Negative for cough, chest tightness, shortness of breath and wheezing.    Cardiovascular:  Positive for syncope. Negative for chest pain, palpitations and leg swelling.   Gastrointestinal:  Negative for abdominal distention, abdominal pain, blood in stool, constipation, diarrhea, nausea and vomiting.   Genitourinary:  Negative for dysuria, flank pain, frequency and hematuria.   Musculoskeletal:  Positive for  neck pain. Negative for back pain and neck stiffness.   Skin:  Negative for color change, pallor, rash and wound.   Allergic/Immunologic: Negative for immunocompromised state.   Neurological:  Positive for syncope and light-headedness. Negative for dizziness, weakness, numbness and headaches.   Hematological:  Negative for adenopathy. Does not bruise/bleed easily.   Psychiatric/Behavioral:  Negative for confusion and decreased concentration.    All other systems reviewed and are negative.          Objective       ED Triage Vitals [05/03/25 1735]   Temperature Pulse Blood Pressure Respirations SpO2 Patient Position - Orthostatic VS   97.8 °F (36.6 °C) 82 125/73 18 100 % Sitting      Temp Source Heart Rate Source BP Location FiO2 (%) Pain Score    Temporal Monitor Left arm -- --      Vitals      Date and Time Temp Pulse SpO2 Resp BP Pain Score FACES Pain Rating User   05/03/25 2000 -- 53 100 % 18 122/73 -- --    05/03/25 1930 -- 59 100 % -- 135/73 -- --    05/03/25 1929 -- 55 98 % -- 121/75 -- --    05/03/25 1928 -- 51 99 % -- 120/71 -- --    05/03/25 1845 -- 70 99 % 18 135/76 -- --    05/03/25 1735 97.8 °F (36.6 °C) 82 100 % 18 125/73 -- -- MS          Vitals:    05/03/25 1928 05/03/25 1929 05/03/25 1930 05/03/25 2000   BP: 120/71 121/75 135/73 122/73   Pulse: (!) 51 55 59 (!) 53   Patient Position - Orthostatic VS: Lying Sitting Standing         Physical Exam  Vitals and nursing note reviewed.   Constitutional:       General: She is not in acute distress.     Appearance: Normal appearance. She is well-developed. She is not ill-appearing, toxic-appearing or diaphoretic.   HENT:      Head: Normocephalic and atraumatic.      Right Ear: External ear normal.      Left Ear: External ear normal.      Nose: Nose normal.      Mouth/Throat:      Mouth: Mucous membranes are moist.      Pharynx: Oropharynx is clear. No oropharyngeal exudate.   Eyes:      Extraocular Movements: Extraocular movements intact.       Conjunctiva/sclera: Conjunctivae normal.      Pupils: Pupils are equal, round, and reactive to light.   Neck:      Vascular: No JVD.      Comments: No reproducible midline cervical spine tenderness.  Cardiovascular:      Rate and Rhythm: Normal rate and regular rhythm.      Pulses: Normal pulses.      Heart sounds: Normal heart sounds. No murmur heard.     No friction rub. No gallop.   Pulmonary:      Effort: Pulmonary effort is normal. No respiratory distress.      Breath sounds: Normal breath sounds. No wheezing, rhonchi or rales.   Abdominal:      General: There is no distension.      Palpations: Abdomen is soft.      Tenderness: There is no abdominal tenderness. There is no guarding or rebound.   Musculoskeletal:         General: No swelling or tenderness. Normal range of motion.      Cervical back: Normal range of motion and neck supple. No rigidity or tenderness.      Right lower leg: No edema.      Left lower leg: No edema.   Skin:     General: Skin is warm and dry.      Coloration: Skin is not pale.      Findings: No erythema or rash.   Neurological:      General: No focal deficit present.      Mental Status: She is alert and oriented to person, place, and time.      Cranial Nerves: No cranial nerve deficit.      Sensory: No sensory deficit.      Motor: No weakness.   Psychiatric:         Mood and Affect: Mood normal.         Behavior: Behavior normal.         Results Reviewed       Procedure Component Value Units Date/Time    D-Dimer [[id number]]  (Normal) Collected: 05/03/25 1914    Lab Status: Final result Specimen: Blood from Arm, Left Updated: 05/03/25 2008     D-Dimer, Quant <0.27 ug/ml FEU     TSH, 3rd generation with Free T4 reflex [[id number]]  (Normal) Collected: 05/03/25 1914    Lab Status: Final result Specimen: Blood from Arm, Left Updated: 05/03/25 1954     TSH 3RD GENERATON 1.058 uIU/mL     hCG, qualitative pregnancy [[id number]]  (Normal) Collected: 05/03/25 1914    Lab Status: Final result  Specimen: Blood from Arm, Left Updated: 05/03/25 1947     Preg, Serum Negative    HS Troponin 0hr (reflex protocol) [[id number]]  (Normal) Collected: 05/03/25 1914    Lab Status: Final result Specimen: Blood from Arm, Left Updated: 05/03/25 1946     hs TnI 0hr <2 ng/L     Basic metabolic panel [[id number]] Collected: 05/03/25 1914    Lab Status: Final result Specimen: Blood from Arm, Left Updated: 05/03/25 1937     Sodium 140 mmol/L      Potassium 3.8 mmol/L      Chloride 107 mmol/L      CO2 28 mmol/L      ANION GAP 5 mmol/L      BUN 15 mg/dL      Creatinine 0.80 mg/dL      Glucose 96 mg/dL      Calcium 9.3 mg/dL      eGFR 97 ml/min/1.73sq m     Narrative:      National Kid[employer] Foundation guidelines for Chronic Kidney Disease (CKD):     Stage 1 with normal or high GFR (GFR > 90 mL/min/1.73 square meters)    Stage 2 Mild CKD (GFR = 60-89 mL/min/1.73 square meters)    Stage 3A Moderate CKD (GFR = 45-59 mL/min/1.73 square meters)    Stage 3B Moderate CKD (GFR = 30-44 mL/min/1.73 square meters)    Stage 4 Severe CKD (GFR = 15-29 mL/min/1.73 square meters)    Stage 5 End Stage CKD (GFR <15 mL/min/1.73 square meters)  Note: GFR calculation is accurate only with a steady state creatinine    Hepatic function panel [[id number]]  (Abnormal) Collected: 05/03/25 1914    Lab Status: Final result Specimen: Blood from Arm, Left Updated: 05/03/25 1937     Total Bilirubin 0.28 mg/dL      Bilirubin, Direct 0.08 mg/dL      Alkaline Phosphatase 99 U/L      AST 12 U/L      ALT 8 U/L      Total Protein 6.5 g/dL      Albumin 4.0 g/dL     Magnesium [[id number]]  (Normal) Collected: 05/03/25 1914    Lab Status: Final result Specimen: Blood from Arm, Left Updated: 05/03/25 1937     Magnesium 2.0 mg/dL     CBC and differential [[id number]]  (Abnormal) Collected: 05/03/25 1914    Lab Status: Final result Specimen: Blood from Arm, Left Updated: 05/03/25 1924     WBC 7.48 Thousand/uL      RBC 4.44 Million/uL      Hemoglobin 12.5 g/dL       Hematocrit 38.9 %      MCV 88 fL      MCH 28.2 pg      MCHC 32.1 g/dL      RDW 13.2 %      MPV 9.0 fL      Platelets 329 Thousands/uL      nRBC 0 /100 WBCs      Segmented % 47 %      Immature Grans % 0 %      Lymphocytes % 45 %      Monocytes % 5 %      Eosinophils Relative 2 %      Basophils Relative 1 %      Absolute Neutrophils 3.55 Thousands/µL      Absolute Immature Grans 0.01 Thousand/uL      Absolute Lymphocytes 3.35 Thousands/µL      Absolute Monocytes 0.40 Thousand/µL      Eosinophils Absolute 0.11 Thousand/µL      Basophils Absolute 0.06 Thousands/µL             XR chest 2 views   ED Interpretation by Na Canales DO (05/03 2024)   No acute abnormality in the chest.          ECG 12 Lead Documentation Only    Date/Time: 5/3/2025 5:42 PM    Performed by: Na Canales DO  Authorized by: Na Canales DO    ECG reviewed by me, the ED Provider: yes    Patient location:  ED  Previous ECG:     Previous ECG:  Compared to current    Comparison ECG info:  4-  Rate:     ECG rate:  81    ECG rate assessment: normal    Rhythm:     Rhythm: sinus rhythm      Rhythm comment:  With sinus arrhythmia  Ectopy:     Ectopy: none    QRS:     QRS axis:  Normal    QRS intervals:  Normal  Conduction:     Conduction: normal    ST segments:     ST segments:  Normal  T waves:     T waves: normal        ED Medication and Procedure Management   Prior to Admission Medications   Prescriptions Last Dose Informant Patient Reported? Taking?   pantoprazole (PROTONIX) 40 mg tablet   No No   Sig: Take 1 tablet (40 mg total) by mouth daily   Patient not taking: Reported on 5/2/2025   sertraline (Zoloft) 25 mg tablet   No No   Sig: Take 1 tablet (25 mg total) by mouth daily   tobramycin (TOBREX) 0.3 % SOLN   No No   Sig: Administer 1 drop to the right eye every 4 (four) hours while awake   Patient not taking: Reported on 5/2/2025      Facility-Administered Medications: None     Patient's Medications    Discharge Prescriptions    No medications on file     No discharge procedures on file.  ED SEPSIS DOCUMENTATION   Time reflects when diagnosis was documented in both MDM as applicable and the Disposition within this note       Time User Action Codes Description Comment    5/3/2025  8:46 PM Na Canales Add [R55] Syncope and collapse     5/3/2025  8:46 PM Na Canales Modify [R55] Syncope and collapse Multiple syncopal episodes                 Na Canales,   05/03/25 2048

## 2025-05-04 ENCOUNTER — APPOINTMENT (INPATIENT)
Dept: NON INVASIVE DIAGNOSTICS | Facility: HOSPITAL | Age: 35
DRG: 204 | End: 2025-05-04
Payer: COMMERCIAL

## 2025-05-04 VITALS
HEART RATE: 70 BPM | OXYGEN SATURATION: 98 % | TEMPERATURE: 98 F | BODY MASS INDEX: 30.51 KG/M2 | SYSTOLIC BLOOD PRESSURE: 117 MMHG | HEIGHT: 69 IN | DIASTOLIC BLOOD PRESSURE: 58 MMHG | RESPIRATION RATE: 21 BRPM | WEIGHT: 206 LBS

## 2025-05-04 LAB
AORTIC ROOT: 2.6 CM
ASCENDING AORTA: 2.6 CM
BSA FOR ECHO PROCEDURE: 2.09 M2
E WAVE DECELERATION TIME: 186 MS
E/A RATIO: 1.5
FRACTIONAL SHORTENING: 35 (ref 28–44)
INTERVENTRICULAR SEPTUM IN DIASTOLE (PARASTERNAL SHORT AXIS VIEW): 0.7 CM
INTERVENTRICULAR SEPTUM: 0.7 CM (ref 0.6–1.1)
LAAS-AP2: 17.1 CM2
LAAS-AP4: 16.1 CM2
LEFT ATRIUM SIZE: 3.5 CM
LEFT ATRIUM VOLUME (MOD BIPLANE): 43 ML
LEFT ATRIUM VOLUME INDEX (MOD BIPLANE): 20.6 ML/M2
LEFT INTERNAL DIMENSION IN SYSTOLE: 3.2 CM (ref 2.1–4)
LEFT VENTRICULAR INTERNAL DIMENSION IN DIASTOLE: 4.9 CM (ref 3.5–6)
LEFT VENTRICULAR POSTERIOR WALL IN END DIASTOLE: 0.7 CM
LEFT VENTRICULAR STROKE VOLUME: 71 ML
LV EF US.2D.A4C+ESTIMATED: 73 %
LVSV (TEICH): 71 ML
MV E'TISSUE VEL-SEP: 14 CM/S
MV PEAK A VEL: 0.76 M/S
MV PEAK E VEL: 114 CM/S
MV STENOSIS PRESSURE HALF TIME: 54 MS
MV VALVE AREA P 1/2 METHOD: 4.07
RIGHT ATRIUM AREA SYSTOLE A4C: 13.1 CM2
RIGHT VENTRICLE ID DIMENSION: 3.5 CM
SL CV LEFT ATRIUM LENGTH A2C: 4.9 CM
SL CV LV EF: 60
SL CV PED ECHO LEFT VENTRICLE DIASTOLIC VOLUME (MOD BIPLANE) 2D: 113 ML
SL CV PED ECHO LEFT VENTRICLE SYSTOLIC VOLUME (MOD BIPLANE) 2D: 42 ML
TR MAX PG: 25 MMHG
TR PEAK VELOCITY: 2.5 M/S
TRICUSPID ANNULAR PLANE SYSTOLIC EXCURSION: 2.2 CM
TRICUSPID VALVE PEAK REGURGITATION VELOCITY: 2.48 M/S

## 2025-05-04 PROCEDURE — NC001 PR NO CHARGE: Performed by: INTERNAL MEDICINE

## 2025-05-04 PROCEDURE — 93306 TTE W/DOPPLER COMPLETE: CPT

## 2025-05-04 PROCEDURE — 93306 TTE W/DOPPLER COMPLETE: CPT | Performed by: INTERNAL MEDICINE

## 2025-05-04 PROCEDURE — 99239 HOSP IP/OBS DSCHRG MGMT >30: CPT | Performed by: INTERNAL MEDICINE

## 2025-05-04 PROCEDURE — 99254 IP/OBS CNSLTJ NEW/EST MOD 60: CPT | Performed by: INTERNAL MEDICINE

## 2025-05-04 RX ADMIN — ENOXAPARIN SODIUM 40 MG: 40 INJECTION SUBCUTANEOUS at 08:47

## 2025-05-04 NOTE — PROGRESS NOTES
Progress Note - Hospitalist   Name: Isabel Mcneil 34 y.o. female I MRN: [id number]  Unit/Bed#: ICU 01 I Date of Admission: 5/3/2025   Date of Service: 5/4/2025 I Hospital Day: 1    Assessment & Plan  Syncope and collapse  Presented with loss of consciousness suspect likely from volume depletion  Suspect likely orthostatic hypotension  SBP did drop approximately 15 points  Will continue telemetry monitoring  May benefit from event monitor  Will check echo  Follow-up cardiology recs  Current moderate episode of major depressive disorder without prior episode (HCC)  Continue Zoloft  Continue f/u with PCP     VTE Pharmacologic Prophylaxis:   Moderate Risk (Score 3-4) - Pharmacological DVT Prophylaxis Ordered: enoxaparin (Lovenox).    Mobility:   Basic Mobility Inpatient Raw Score: 24  JH-HLM Goal: 8: Walk 250 feet or more  JH-HLM Achieved: 7: Walk 25 feet or more  JH-HLM Goal achieved. Continue to encourage appropriate mobility.    Patient Centered Rounds: I performed bedside rounds with nursing staff today.   Discussions with Specialists or Other Care Team Provider: cm, nursing    Education and Discussions with Family / Patient: Patient declined call to .     Current Length of Stay: 1 day(s)  Current Patient Status: Inpatient   Certification Statement: The patient will continue to require additional inpatient hospital stay due to see below  Discharge Plan:  Pending cardiology consult and echo.  Anticipate DC next 24 hours    Code Status: Level 1 - Full Code    Subjective   Denies cp, sob, cough, fevers    Objective :  Temp:  [97.8 °F (36.6 °C)-98.3 °F (36.8 °C)] 98.3 °F (36.8 °C)  HR:  [45-82] 75  BP: (118-154)/(60-96) 121/60  Resp:  [14-22] 22  SpO2:  [97 %-100 %] 97 %  O2 Device: None (Room air)    Body mass index is 30.42 kg/m².     Input and Output Summary (last 24 hours):     Intake/Output Summary (Last 24 hours) at 5/4/2025 1010  Last data filed at 5/3/2025 2016  Gross per 24 hour   Intake 1000 ml    Output --   Net 1000 ml       Physical Exam  Constitutional:       General: She is not in acute distress.     Appearance: She is well-developed. She is not diaphoretic.   HENT:      Head: Normocephalic and atraumatic.      Nose: Nose normal.      Mouth/Throat:      Pharynx: No oropharyngeal exudate.   Eyes:      General: No scleral icterus.        Right eye: No discharge.         Left eye: No discharge.      Conjunctiva/sclera: Conjunctivae normal.   Neck:      Thyroid: No thyromegaly.      Vascular: No JVD.   Cardiovascular:      Rate and Rhythm: Normal rate and regular rhythm.      Heart sounds: Normal heart sounds. No murmur heard.     No friction rub. No gallop.   Pulmonary:      Effort: Pulmonary effort is normal. No respiratory distress.      Breath sounds: Normal breath sounds. No wheezing or rales.   Chest:      Chest wall: No tenderness.   Abdominal:      General: Bowel sounds are normal. There is no distension.      Palpations: Abdomen is soft.      Tenderness: There is no abdominal tenderness. There is no guarding or rebound.   Musculoskeletal:         General: No tenderness or deformity. Normal range of motion.      Cervical back: Normal range of motion and neck supple.   Skin:     General: Skin is warm and dry.      Findings: No erythema or rash.   Neurological:      Mental Status: She is alert. Mental status is at baseline.      Cranial Nerves: No cranial nerve deficit.      Sensory: No sensory deficit.      Motor: No abnormal muscle tone.      Coordination: Coordination normal.           Lines/Drains:        Telemetry:  Telemetry Orders (From admission, onward)               24 Hour Telemetry Monitoring  Continuous x 24 Hours (Telem)        Question:  Reason for 24 Hour Telemetry  Answer:  Syncope suspected to be cardiac in origin                     Telemetry Reviewed: Normal Sinus Rhythm  Indication for Continued Telemetry Use: Arrthymias requiring medical therapy               Lab Results: I have  reviewed the following results:   Results from last 7 days   Lab Units 05/03/25  1914   WBC Thousand/uL 7.48   HEMOGLOBIN g/dL 12.5   HEMATOCRIT % 38.9   PLATELETS Thousands/uL 329   SEGS PCT % 47   LYMPHO PCT % 45*   MONO PCT % 5   EOS PCT % 2     Results from last 7 days   Lab Units 05/03/25  1914   SODIUM mmol/L 140   POTASSIUM mmol/L 3.8   CHLORIDE mmol/L 107   CO2 mmol/L 28   BUN mg/dL 15   CREATININE mg/dL 0.80   ANION GAP mmol/L 5   CALCIUM mg/dL 9.3   ALBUMIN g/dL 4.0   TOTAL BILIRUBIN mg/dL 0.28   ALK PHOS U/L 99   ALT U/L 8   AST U/L 12*   GLUCOSE RANDOM mg/dL 96         Results from last 7 days   Lab Units 04/30/25  1333   POC GLUCOSE mg/dl 87     Results from last 7 days   Lab Units 05/02/25  0930   HEMOGLOBIN A1C % 5.3           Recent Cultures (last 7 days):         Imaging Results Review: I personally reviewed the following image studies/reports in PACS and discussed pertinent findings with Radiology: chest xray. My interpretation of the radiology images/reports is:  .  Other Study Results Review: EKG was reviewed.     Last 24 Hours Medication List:     Current Facility-Administered Medications:     enoxaparin (LOVENOX) subcutaneous injection 40 mg, Daily    Administrative Statements   Today, Patient Was Seen By: Russell Siu MD  I have spent a total time of 30 minutes in caring for this patient on the day of the visit/encounter including Diagnostic results.  All diagnoses as per    **Please Note: This note may have been constructed using a voice recognition system.**

## 2025-05-04 NOTE — ASSESSMENT & PLAN NOTE
"Reported hx of \"orthostatic hypotension.. but never syncope before..\" till last 4-5 days , had events of feeling lightheaded , occurred on standing but other times occurred while sitting, she reported feeling palpitation prior the event on 5/3 , along with \"tunnel vision\" , then syncopized for about 1 minute \"put head back on the chair.. fainted for a minute\" witnessed by her coworker who was not available to call during the admission. No signs to suggest seizure. Patient noted upon opening her eyes back felt little \"groggy\" for 5-10 minutes but then back to normal.     In the ED , 5/3, BP WNL   HR range 50-  80 's   EKG NSR but noted some PVC's, asymptomatic   Troponin , D-Dimer, and TSH are all WNL     Patient drinks 2-3 large cups of coffee daily ;works \"[employer]\"; \"not drink enough water , 1-2 glasses a day    Not on any home medications     Differentials: suspected cardiac / arrhythmia etiology, dehydration / coffee use may be contributing, hx of orthostatic hypotension but in ED BP WNL . Neurological etiology on differential but clinically less likely. Psych etiology possible.     PLAN   - advised on PO well hydration ; s/p IVF in the ED   - advised on limiting coffee use to 1 cup / decaf   - monitor on tele ; will obtain ECHO   - potentially could benefit from ZIO patch & may consider daily ASA 81 mg till further eval ; appreciate cardiology input - consultation in process  - fall precautions   - monitor vitals , exams and revaluate   "

## 2025-05-04 NOTE — ASSESSMENT & PLAN NOTE
"Has hx of depression but per patient 5/3/25 \"mood stable .. Was worse in the past,, better lately... manageable\" she was prescribed zoloft lately but have not started it yet \"did not have time to pick it up from pharmacy\"     Will hold off starting Zoloft now pending further eval of above     Appropriate affect and communicating well during admission ; not tearful and does not seem in stress or depressed, cheerful and cooperative , denies SI     Plan   Continue f/u with PCP   "

## 2025-05-04 NOTE — DISCHARGE SUMMARY
Discharge Summary - Hospitalist   Name: Isabel Mcneil 34 y.o. female I MRN: [id number]  Unit/Bed#: ICU 01 I Date of Admission: 5/3/2025   Date of Service: 5/4/2025 I Hospital Day: 1     Assessment & Plan  Syncope and collapse  Presented with loss of consciousness suspect likely from volume depletion  Suspect likely orthostatic hypotension  SBP did drop approximately 15 points  Suspect likely secondary to orthostasis  Blood pressure improved  Echo within normal limits  Will have event monitor as an outpatient  Current moderate episode of major depressive disorder without prior episode (HCC)  Continue Zoloft  Continue f/u with PCP       Discharging Physician / Practitioner: Russell Siu MD  PCP: Felicitas Bowen PA-C  Admission Date:   Admission Orders (From admission, onward)       Ordered        05/03/25 2047  INPATIENT ADMISSION  Once                          Discharge Date: 05/04/25    Medical Problems       Resolved Problems  Date Reviewed: 5/2/2025   None         Consultations During Hospital Stay:   cardiology    Procedures Performed:   none    Significant Findings / Test Results:   XR chest 2 views  Result Date: 5/4/2025  Impression: No acute cardiopulmonary disease. Workstation performed: [id number]      Incidental Findings:   none    Test Results Pending at Discharge (will require follow up):   none     Outpatient Tests Requested:  none    Complications:  none    Reason for Admission: Syncope     Hospital Course:     Isabel Mcneil is a 34 y.o. female patient who originally presented to the hospital on 5/3/2025 with no significant past medical history initially presented with syncopal episode.  Suspect likely secondary orthostasis.  Echo within normal limits.  Medically cleared for discharge will follow-up outpatient with primary care doctor one of the event monitor outpatient.Please see above list of diagnoses and related plan for additional information.     Condition at Discharge: stable  "    Discharge Day Visit / Exam:     Subjective:   Denies chest pain, shortness of breath, cough, fevers, chills  Vitals: Blood Pressure: 121/60 (05/04/25 0915)  Pulse: 75 (05/04/25 0915)  Temperature: 98.3 °F (36.8 °C) (05/04/25 0700)  Temp Source: Oral (05/04/25 0700)  Respirations: 22 (05/04/25 0700)  Height: 5' 9\" (175.3 cm) (05/04/25 0915)  Weight - Scale: 93.4 kg (206 lb) (05/04/25 0915)  SpO2: 97 % (05/04/25 0700)  Exam:   Physical Exam  Constitutional:       General: She is not in acute distress.     Appearance: She is well-developed. She is not diaphoretic.   HENT:      Head: Normocephalic and atraumatic.      Nose: Nose normal.      Mouth/Throat:      Pharynx: No oropharyngeal exudate.   Eyes:      General: No scleral icterus.        Right eye: No discharge.         Left eye: No discharge.      Conjunctiva/sclera: Conjunctivae normal.   Neck:      Thyroid: No thyromegaly.      Vascular: No JVD.   Cardiovascular:      Rate and Rhythm: Normal rate and regular rhythm.      Heart sounds: Normal heart sounds. No murmur heard.     No friction rub. No gallop.   Pulmonary:      Effort: Pulmonary effort is normal. No respiratory distress.      Breath sounds: Normal breath sounds. No wheezing or rales.   Chest:      Chest wall: No tenderness.   Abdominal:      General: Bowel sounds are normal. There is no distension.      Palpations: Abdomen is soft.      Tenderness: There is no abdominal tenderness. There is no guarding or rebound.   Musculoskeletal:         General: No tenderness or deformity. Normal range of motion.      Cervical back: Normal range of motion and neck supple.   Skin:     General: Skin is warm and dry.      Findings: No erythema or rash.   Neurological:      Mental Status: She is alert. Mental status is at baseline.      Cranial Nerves: No cranial nerve deficit.      Sensory: No sensory deficit.      Motor: No abnormal muscle tone.      Coordination: Coordination normal.       (    Discussion with " Family: pt, declined family update    Discharge instructions/Information to patient and family:   See after visit summary for information provided to patient and family.      Provisions for Follow-Up Care:  See after visit summary for information related to follow-up care and any pertinent home health orders.      Disposition:     Home    For Discharges to [facility]:   Not Applicable to this Patient - Not Applicable to this Patient    Planned Readmission: none     Discharge Statement:  I spent 60 minutes discharging the patient. This time was spent on the day of discharge. I had direct contact with the patient on the day of discharge. Greater than 50% of the total time was spent examining patient, answering all patient questions, arranging and discussing plan of care with patient as well as directly providing post-discharge instructions.  Additional time then spent on discharge activities.    Discharge Medications:  See after visit summary for reconciled discharge medications provided to patient and family.      ** Please Note: This note has been constructed using a voice recognition system **

## 2025-05-04 NOTE — ASSESSMENT & PLAN NOTE
Presented with loss of consciousness suspect likely from volume depletion  Suspect likely orthostatic hypotension  SBP did drop approximately 15 points  Will continue telemetry monitoring  May benefit from event monitor  Will check echo  Follow-up cardiology recs

## 2025-05-04 NOTE — PLAN OF CARE
Problem: PAIN - ADULT  Goal: Verbalizes/displays adequate comfort level or baseline comfort level  Description: Interventions:- Encourage patient to monitor pain and request assistance- Assess pain using appropriate pain scale- Administer analgesics based on type and severity of pain and evaluate response- Implement non-pharmacological measures as appropriate and evaluate response- Consider cultural and social influences on pain and pain management- Notify physician/advanced practitioner if interventions unsuccessful or patient reports new pain  Outcome: Progressing     Problem: INFECTION - ADULT  Goal: Absence or prevention of progression during hospitalization  Description: INTERVENTIONS:- Assess and monitor for signs and symptoms of infection- Monitor lab/diagnostic results- Monitor all insertion sites, i.e. indwelling lines, tubes, and drains- Monitor endotracheal if appropriate and nasal secretions for changes in amount and color- Bosler appropriate cooling/warming therapies per order- Administer medications as ordered- Instruct and encourage patient and family to use good hand hygiene technique- Identify and instruct in appropriate isolation precautions for identified infection/condition  Outcome: Progressing  Goal: Absence of fever/infection during neutropenic period  Description: INTERVENTIONS:- Monitor WBC  Outcome: Progressing     Problem: SAFETY ADULT  Goal: Patient will remain free of falls  Description: INTERVENTIONS:- Educate patient/family on patient safety including physical limitations- Instruct patient to call for assistance with activity - Consult OT/PT to assist with strengthening/mobility - Keep Call bell within reach- Keep bed low and locked with side rails adjusted as appropriate- Keep care items and personal belongings within reach- Initiate and maintain comfort rounds- Make Fall Risk Sign visible to staff- Offer Toileting every 2 Hours, in advance of need- Initiate/Maintain bed alarm-  Obtain necessary fall risk management equipment: - Apply yellow socks and bracelet for high fall risk patients- Consider moving patient to room near nurses station  Outcome: Progressing  Goal: Maintain or return to baseline ADL function  Description: INTERVENTIONS:-  Assess patient's ability to carry out ADLs; assess patient's baseline for ADL function and identify physical deficits which impact ability to perform ADLs (bathing, care of mouth/teeth, toileting, grooming, dressing, etc.)- Assess/evaluate cause of self-care deficits - Assess range of motion- Assess patient's mobility; develop plan if impaired- Assess patient's need for assistive devices and provide as appropriate- Encourage maximum independence but intervene and supervise when necessary- Involve family in performance of ADLs- Assess for home care needs following discharge - Consider OT consult to assist with ADL evaluation and planning for discharge- Provide patient education as appropriate  Outcome: Progressing  Goal: Maintains/Returns to pre admission functional level  Description: INTERVENTIONS:- Perform AM-PAC 6 Click Basic Mobility/ Daily Activity assessment daily.- Set and communicate daily mobility goal to care team and patient/family/caregiver. - Collaborate with rehabilitation services on mobility goals if consulted- Perform Range of Motion 3 times a day.- Reposition patient every 2 hours.- Dangle patient 3 times a day- Stand patient 3 times a day- Ambulate patient 3 times a day- Out of bed to chair 3 times a day - Out of bed for meals 3 times a day- Out of bed for toileting- Record patient progress and toleration of activity level   Outcome: Progressing     Problem: DISCHARGE PLANNING  Goal: Discharge to home or other facility with appropriate resources  Description: INTERVENTIONS:- Identify barriers to discharge w/patient and caregiver- Arrange for needed discharge resources and transportation as appropriate- Identify discharge learning  needs (meds, wound care, etc.)- Arrange for interpretive services to assist at discharge as needed- Refer to Case Management Department for coordinating discharge planning if the patient needs post-hospital services based on physician/advanced practitioner order or complex needs related to functional status, cognitive ability, or social support system  Outcome: Progressing

## 2025-05-04 NOTE — ASSESSMENT & PLAN NOTE
Multiple episodes  Appear to be related to volume depletion  Advised to cut back on caffeine use, increase hydration, liberal salt use, advised about using compression stockings  Offered Corrine, this time patient would like to use nonpharmacologic measures then reassess  Echocardiogram done: EF 60%  Plan for outpatient 2-week event monitor and follow-up

## 2025-05-04 NOTE — ASSESSMENT & PLAN NOTE
Presented with loss of consciousness suspect likely from volume depletion  Suspect likely orthostatic hypotension  SBP did drop approximately 15 points  Suspect likely secondary to orthostasis  Blood pressure improved  Echo within normal limits  Will have event monitor as an outpatient

## 2025-05-04 NOTE — UTILIZATION REVIEW
"Initial Clinical Review    Admission: Date/Time/Statement:   Admission Orders (From admission, onward)       Ordered        05/03/25 2047  INPATIENT ADMISSION  Once                          Orders Placed This Encounter   Procedures    INPATIENT ADMISSION     Standing Status:   Standing     Number of Occurrences:   1     Level of Care:   Med Surg [16]     Bed request comments:   tele     Estimated length of stay:   More than 2 Midnights     Certification:   I certify that inpatient services are medically necessary for this patient for a duration of greater than two midnights. See H&P and MD Progress Notes for additional information about the patient's course of treatment.     ED Arrival Information       Expected   -    Arrival   5/3/2025 17:33    Acuity   Urgent              Means of arrival   Wheelchair    Escorted by   Family Member    Service   Hospitalist    Admission type   Emergency              Arrival complaint   Syncope             Chief Complaint   Patient presents with    Syncope     Patient reports \"I been having episodes of passing out, my heart feels like its beating out my chest, and I feel light headed.\" Symptoms started 1 week ago.Denies N/V.        Initial Presentation: 34 y.o. female with a PMH of mild intermittent asthma, depression, and prior diagnosis of orthostatic hypotension, presented to the ED from home with complaint of multiple syncopal episodes in the past week. She denies ever passing out before.  Patient states for the past 5 days she has passed out at least once each day. She states initially it would happen when she would be sitting and then stood up and she would feel lightheaded followed by tunnel vision and getting sweaty and then she would pass out. She reported feeling palpitations prior the event on 5/3 along with tunnel vision, then \"fainted for a minute\" witnessed by her coworker who was not available to call during the admission.  No signs to suggest seizure. Patient noted " "upon opening her eyes felt little \"groggy\" for 5-10 minutes but then back to normal.   Patient was given 1L IVF in the ED.  Currently AOx3 on exam.  RRR.     5/3 Inpatient admission for evaluation and treatment of syncope:  Telemetry, obtain ECHO, consult Cardiology.      5/4 Cardiology consult:  Syncope.  Multiple episodes. Currently patient is feeling well with no complaints. Patient with syncope of unclear etiology. By history appears to be consistent with orthostasis/volume depletion.  Patient admits to a lot of coffee use and decreased oral intake otherwise. Advised to cut back on caffeine use, increase hydration, liberal salt use, advised about using compression stockings. Offered Florinef, this time patient would like to use nonpharmacologic measures then reassess. Echocardiogram done: EF 60%. Plan for outpatient 2-week event monitor and follow-up.     5/4 1312 Discharged to home/self care.       ED Treatment-Medication Administration from 05/03/2025 1733 to 05/03/2025 2121         Date/Time Order Dose Route Action     05/03/2025 1916 sodium chloride 0.9 % bolus 1,000 mL 1,000 mL Intravenous New Bag            Scheduled Medications:    enoxaparin, 40 mg, Subcutaneous, Daily      Continuous IV Infusions: None.     PRN Meds: None.       ED Triage Vitals   Temperature Pulse Respirations Blood Pressure SpO2 Pain Score   05/03/25 1735 05/03/25 1735 05/03/25 1735 05/03/25 1735 05/03/25 1735 05/03/25 2122   97.8 °F (36.6 °C) 82 18 125/73 100 % No Pain     Weight (last 2 days) before discharge       Date/Time Weight    05/04/25 0915 93.4 (206)    05/03/25 2122 93.6 (206.35)            Vital Signs      Date/Time Temp Pulse Resp BP MAP (mmHg) SpO2 O2 Device Patient Position - Orthostatic VS Kasia Coma Scale Score Pain   05/04/25 1100 98 °F (36.7 °C) 70 21 117/58 81 98 % -- Lying -- --   05/04/25 0915 -- 75 -- 121/60 -- -- -- -- -- --   05/04/25 0843 -- 75 -- 121/60 84 -- -- -- -- --   05/04/25 0800 -- -- -- -- -- -- " -- -- 15 No Pain   05/04/25 0700 98.3 °F (36.8 °C) 45 22 126/77 93 97 % -- Lying -- --   05/04/25 0513 -- 54 -- 118/71 90 97 % -- -- -- --   05/03/25 2122 97.8 °F (36.6 °C) 51 20 154/96 120 -- -- Sitting 15 No Pain   05/03/25 2100 -- 52 14 121/70 91 99 % -- -- -- --   05/03/25 2000 -- 53 18 122/73 93 100 % -- -- -- --   05/03/25 1930 -- 59 -- 135/73 98 100 % None (Room air) Standing -- --   05/03/25 1929 -- 55 -- 121/75 94 98 % None (Room air) Sitting -- --   05/03/25 1928 -- 51 -- 120/71 91 99 % None (Room air) Lying -- --   05/03/25 1845 -- 70 18 135/76 100 99 % -- -- -- --   05/03/25 1838 -- -- -- -- -- -- -- -- 15 --   05/03/25 1735 97.8 °F (36.6 °C) 82 18 125/73 -- 100 % -- Sitting -- --              Pertinent Labs/Diagnostic Test Results:       Radiology:  XR chest 2 views   ED Interpretation by Na Canales DO (05/03 2024)   No acute abnormality in the chest.      Final Interpretation by Jordyn Kearney MD (05/04 2525)      No acute cardiopulmonary disease.            Workstation performed: [id number]           Cardiology:    Echo complete w/ contrast if indicated   Final Result by Nathaly Pool MD (05/04 8384)        Left Ventricle: Left ventricular cavity size is normal. Wall thickness    is normal. The left ventricular ejection fraction is 60%. Systolic    function is normal. Wall motion is normal. Diastolic function is normal.             5/3 ED EKG:    Patient location:  ED   Previous ECG:     Previous ECG:  Compared to current     Comparison ECG info:  4-   Rate:     ECG rate:  81     ECG rate assessment: normal    Rhythm:     Rhythm: sinus rhythm      Rhythm comment:  With sinus arrhythmia   Ectopy:     Ectopy: none    QRS:     QRS axis:  Normal     QRS intervals:  Normal   Conduction:     Conduction: normal    ST segments:     ST segments:  Normal   T waves:     T waves: normal         Results from last 7 days   Lab Units 05/03/25  1914 05/02/25  0930   WBC Thousand/uL 7.48  4.97   HEMOGLOBIN g/dL 12.5 12.6   HEMATOCRIT % 38.9 39.9   PLATELETS Thousands/uL 329 286   TOTAL NEUT ABS Thousands/µL 3.55 2.26         Results from last 7 days   Lab Units 05/03/25  1914 05/02/25  0930   SODIUM mmol/L 140 140   POTASSIUM mmol/L 3.8 4.3   CHLORIDE mmol/L 107 106   CO2 mmol/L 28 30   ANION GAP mmol/L 5 4   BUN mg/dL 15 15   CREATININE mg/dL 0.80 0.86   EGFR ml/min/1.73sq m 97 89   CALCIUM mg/dL 9.3 9.4   MAGNESIUM mg/dL 2.0  --      Results from last 7 days   Lab Units 05/03/25  1914 05/02/25  0930   AST U/L 12* 13   ALT U/L 8 8   ALK PHOS U/L 99 106*   TOTAL PROTEIN g/dL 6.5 6.7   ALBUMIN g/dL 4.0 4.3   TOTAL BILIRUBIN mg/dL 0.28 0.53   BILIRUBIN DIRECT mg/dL 0.08  --      Results from last 7 days   Lab Units 04/30/25  1333   POC GLUCOSE mg/dl 87     Results from last 7 days   Lab Units 05/03/25  1914   GLUCOSE RANDOM mg/dL 96         Results from last 7 days   Lab Units 05/02/25  0930   HEMOGLOBIN A1C % 5.3   EAG mg/dl 105         Results from last 7 days   Lab Units 05/03/25  1914   HS TNI 0HR ng/L <2     Results from last 7 days   Lab Units 05/03/25  1914   D-DIMER QUANTITATIVE ug/ml FEU <0.27         Results from last 7 days   Lab Units 05/03/25  1914 05/02/25  0930   TSH 3RD GENERATON uIU/mL 1.058 1.250           Past Medical History:   Diagnosis Date    Asthma     childhood asthma  inhaler prn    Autism spectrum disorder     Depression     diag with depression/anxiety age 20 -no meds now    Migraine     diag 2 yrs ago- takes extra-strength tylenol    Termination of pregnancy (fetus)     Urinary tract infection     hx of  last episode 2018    Varicella     received vaccines     Present on Admission:   Current moderate episode of major depressive disorder without prior episode (HCC)      Admitting Diagnosis: Syncope and collapse [R55]  Syncope [R55]  Age/Sex: 34 y.o. female      Network Utilization Review Department  ATTENTION: Please call with any questions or concerns to [phone] and  carefully listen to the prompts so that you are directed to the right person. All voicemails are confidential.   For Discharge needs, contact Care Management DC Support Team at [phone] opt. [phone]  Send all requests for admission clinical reviews, approved or denied determinations and any other requests to dedicated fax number below belonging to the campus where the patient is receiving treatment. List of dedicated fax numbers for the Facilities:  FACILITY NAME UR FAX NUMBER   ADMISSION DENIALS (Administrative/Medical Necessity) [phone]   DISCHARGE SUPPORT TEAM (NETWORK) [phone]   PARENT CHILD HEALTH (Maternity/NICU/Pediatrics) [phone]   [facility] [phone]   [facility] [phone]   [facility] [phone]   [facility] [phone]   [facility] [phone]   [facility] [phone]   [facility] [phone]   [facility] [phone]   [facility] [phone]   [facility] [phone]   [facility] [phone]   [facility] [phone]

## 2025-05-04 NOTE — H&P
"H&P - Hospitalist   Name: Isabel Mcneil 34 y.o. female I MRN: [id number]  Unit/Bed#: ICU 01 I Date of Admission: 5/3/2025   Date of Service: 5/3/2025 I Hospital Day: 0     Assessment & Plan  Syncope and collapse  Reported hx of \"orthostatic hypotension.. but never syncope before..\" till last 4-5 days , had events of feeling lightheaded , occurred on standing but other times occurred while sitting, she reported feeling palpitation prior the event on 5/3 , along with \"tunnel vision\" , then syncopized for about 1 minute \"put head back on the chair.. fainted for a minute\" witnessed by her coworker who was not available to call during the admission. No signs to suggest seizure. Patient noted upon opening her eyes back felt little \"groggy\" for 5-10 minutes but then back to normal.     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 Neurological etiology on differential but clinically less likely. Psych etiology possible.     PLAN   - advised on PO well hydration ; s/p IVF in the ED   - advised on limiting coffee use to 1 cup / decaf   - monitor on tele ; will obtain ECHO   - potentially could benefit from ZIO patch & may consider daily ASA 81 mg till further eval ; appreciate cardiology input - consultation in process  - fall precautions   - monitor vitals , exams and revaluate   Current moderate episode of major depressive disorder without prior episode (HCC)  Has hx of depression but per patient 5/3/25 \"mood stable .. Was worse in the past,, better lately... manageable\" she was prescribed zoloft lately but have not started it yet \"did " "not have time to pick it up from pharmacy\"     Will hold off starting Zoloft now pending further eval of above     Appropriate affect and communicating well during admission ; not tearful and does not seem in stress or depressed, cheerful and cooperative , denies SI     Plan   Continue f/u with PCP       VTE Pharmacologic Prophylaxis:   Moderate Risk (Score 3-4) - Pharmacological DVT Prophylaxis Ordered: enoxaparin (Lovenox).  Code Status: Level 1 - Full Code   Discussion with family: Patient declined call to .     Anticipated Length of Stay: Patient will be admitted on an observation basis with an anticipated length of stay of less than 2 midnights secondary to cardiac eval for collapse .    History of Present Illness   Chief Complaint: collapse/ syncope     Isabel Mcneil is a 34 y.o. female with a PMH of mild intermittent asthma, Reported hx of \"orthostatic hypotension.. but never syncope before..\" till last 4-5 days , had events of feeling lightheaded , occurred on standing but other times occurred while sitting, she reported feeling palpitation prior the event on 5/3 , along with \"tunnel vision\" , then syncopized for about 1 minute \"put head back on the chair.. fainted for a minute\" witnessed by her coworker who was not available to call during the admission. No signs to suggest seizure. Patient noted upon opening her eyes back felt little \"groggy\" for 5-10 minutes but then back to normal.     In the ED , 5/3, BP WNL   HR range 50-  80 's   EKG NSR but noted some PVC's, asymptomatic   Troponin , D-Dimer, and TSH are all WNL     Patient drinks 2-3 large cups of coffee daily ;works \"as[employer]\"; \"not drink enough water , 1-2 glasses a day    Not on any home medications     Seen and examined on admission , feels well , supine in bed in no acute distress  , ROS - during admission , exam unremarkable but PVC's , BP WNL , above A&P explained , she agrees, confirmed full code.     Review of " Systems  - GENERAL: recurrent lightheadness events and lately had episodes of syncope , see HPI for details ; denies any Negative for any nausea, vomiting, fevers, chills, or weight loss.  - HEENT: only pre-syncope had tunneled vision for < 1 minute ; otherwise Negative for any head/Neck trauma, pain, double/blurry vision, sinusitis, rhinitis, nose bleeding.  - CARDIAC: felt palpitation before syncope earlier today ; otherwise Negative for any chest pain, palpitation, Dyspnea on exertion, peripheral edema.  - PULMONARY: Negative for any SOB, cough, wheezing.   - GASTROINTESTINAL: Negative for any abdominal pain, N/V/D/C, blood in stool.   - GENITOURINARY: Negative for any dysuria, hematuria, incontinence.  - NEUROLOGIC: Negative for any muscle weakness, numbness/tingling, memory changes.    - MUSCULOSKELETAL: Negative for any joint pains/swelling, limited ROM.   - INTEGUMENTARY: Negative for any rashes, cuts/ lesions.  - HEMATOLOGIC: Negative for any abnormal bruising, frequent infections or bleeding.    Historical Information   Past Medical History:   Diagnosis Date    Asthma     childhood asthma  inhaler prn    Autism spectrum disorder     Depression     diag with depression/anxiety age 20 -no meds now    Migraine     diag 2 yrs ago- takes extra-strength tylenol    Termination of pregnancy (fetus)     Urinary tract infection     hx of  last episode     Varicella     received vaccines     Past Surgical History:   Procedure Laterality Date    NO PAST SURGERIES      WISDOM TOOTH EXTRACTION  age 22     Social History     Tobacco Use    Smoking status: Every Day     Current packs/day: 0.00     Average packs/day: 0.5 packs/day for 10.0 years (5.0 ttl pk-yrs)     Types: Cigarettes     Start date: 10/1/2008     Last attempt to quit: 10/1/2018     Years since quittin.5    Smokeless tobacco: Never    Tobacco comments:     smokes 5 cigarette per day   Vaping Use    Vaping status: Never Used   Substance and Sexual  Activity    Alcohol use: Not Currently     Comment: occasional-none since pregnancy    Drug use: Yes     Types: Marijuana     Comment: occasionally 2x/month    Sexual activity: Not Currently     Partners: Male     Birth control/protection: Male Sterilization     E-Cigarette/Vaping    E-Cigarette Use Never User      E-Cigarette/Vaping Substances     Family History   Problem Relation Age of Onset    Asthma Mother     Bipolar disorder Mother     Post-traumatic stress disorder Mother     Diabetes Mother     Anxiety disorder Father     Depression Sister     Drug abuse Sister     Kidney disease Sister     No Known Problems Daughter     Cancer Maternal Grandfather     No Known Problems Paternal Grandmother     No Known Problems Paternal Grandfather     Depression Maternal Uncle     Anxiety disorder Maternal Uncle     Diabetes Paternal Aunt     Breast cancer Family     Colon cancer Neg Hx     Ovarian cancer Neg Hx     Uterine cancer Neg Hx     Cervical cancer Neg Hx      Social History:  Marital Status: Single   Occupation:   Patient Pre-hospital Living Situation: Home  Patient Pre-hospital Level of Mobility: walks  Patient Pre-hospital Diet Restrictions: none     Meds/Allergies   I have reviewed home medications with patient personally.  Prior to Admission medications    Medication Sig Start Date End Date Taking? Authorizing Provider   pantoprazole (PROTONIX) 40 mg tablet Take 1 tablet (40 mg total) by mouth daily  Patient not taking: Reported on 4/30/2025 2/25/25   Eva Bender DO   sertraline (Zoloft) 25 mg tablet Take 1 tablet (25 mg total) by mouth daily 5/2/25   Sarah Singh PA-C   tobramycin (TOBREX) 0.3 % SOLN Administer 1 drop to the right eye every 4 (four) hours while awake  Patient not taking: Reported on 2/24/2025 6/3/24   Ting Uriostegui DO     No Known Allergies    Objective :  Temp:  [97.8 °F (36.6 °C)] 97.8 °F (36.6 °C)  HR:  [51-82] 51  BP: (120-154)/(70-96) 154/96  Resp:  [14-20] 20  SpO2:  [98 %-100  %] 99 %  O2 Device: None (Room air)    Physical Exam   - GEN: Appears well, alert and oriented x 3, pleasant and cooperative, in no acute distress  - HEENT: Anicteric, mucous membranes moist, PERRL and EOMI   - NECK: No lymphadenopathy, JVD or carotid bruits   - HEART: RRR, normal S1 and S2, no murmurs, clicks, gallops or rubs   - LUNGS: Clear to auscultation bilaterally; no wheezes, rales, or rhonchi  - ABDOMEN: Normal bowel sounds, soft, no tenderness, no distention, no organomegaly or masses felt on exam.   - EXTREMITIES: Peripheral pulses normal; no clubbing, cyanosis, or edema  - NEURO: No focal findings, CN II-XII are grossly intact.   - Musculoskeletal: 5/5 strength, normal ROM, no swollen or erythematous joints.   - SKIN: Normal without suspicious lesions on exposed skin      Lines/Drains:            Lab Results: I have reviewed the following results:  Results from last 7 days   Lab Units 05/03/25  1914   WBC Thousand/uL 7.48   HEMOGLOBIN g/dL 12.5   HEMATOCRIT % 38.9   PLATELETS Thousands/uL 329   SEGS PCT % 47   LYMPHO PCT % 45*   MONO PCT % 5   EOS PCT % 2     Results from last 7 days   Lab Units 05/03/25  1914   SODIUM mmol/L 140   POTASSIUM mmol/L 3.8   CHLORIDE mmol/L 107   CO2 mmol/L 28   BUN mg/dL 15   CREATININE mg/dL 0.80   ANION GAP mmol/L 5   CALCIUM mg/dL 9.3   ALBUMIN g/dL 4.0   TOTAL BILIRUBIN mg/dL 0.28   ALK PHOS U/L 99   ALT U/L 8   AST U/L 12*   GLUCOSE RANDOM mg/dL 96         Results from last 7 days   Lab Units 04/30/25  1333   POC GLUCOSE mg/dl 87     Lab Results   Component Value Date    HGBA1C 5.3 05/02/2025           Imaging Results Review: I reviewed radiology reports from this admission including: chest xray.  Other Study Results Review: EKG was reviewed.     Administrative Statements   I have spent a total time of 45 minutes in caring for this patient on the day of the visit/encounter including Diagnostic results, Prognosis, Risks and benefits of tx options, Instructions for  management, Patient and family education, Importance of tx compliance, Risk factor reductions, Impressions, Counseling / Coordination of care, Documenting in the medical record, Reviewing/placing orders in the medical record (including tests, medications, and/or procedures), Obtaining or reviewing history  , and Communicating with other healthcare professionals .    ** Please Note: This note has been constructed using a voice recognition system. **

## 2025-05-04 NOTE — CONSULTS
Consultation - Cardiology   Name: Isabel Mcneil 34 y.o. female I MRN: [id number]  Unit/Bed#: ICU 01 I Date of Admission: 5/3/2025   Date of Service: 5/4/2025 I Hospital Day: 1   Inpatient consult to Cardiology  Consult performed by: Mitzi Keita PA-C  Consult ordered by: Barak Wilburn DO        Physician Requesting Evaluation: Russell Siu MD   Reason for Evaluation / Principal Problem: Syncope    Assessment & Plan  Syncope and collapse  Multiple episodes  Appear to be related to volume depletion  Advised to cut back on caffeine use, increase hydration, liberal salt use, advised about using compression stockings  Offered Corrine, this time patient would like to use nonpharmacologic measures then reassess  Echocardiogram done: EF 60%  Plan for outpatient 2-week event monitor and follow-up  Current moderate episode of major depressive disorder without prior episode (HCC)  Mgmt per SLIM    I have discussed the above management plan in detail with the primary service.   Ok for discharge from Cardiology service perspective.    History of Present Illness   Isabel Mcneil is a 34 y.o. female who presents with syncope.  Patient notes she has had multiple episodes of syncope over the last couple of days.  Patient notes a feeling of lightheadedness then syncopized this.  Patient states she was standing at 1 point when she got palpitations, tunnel vision and then syncopized for about 1 minute.  This was witnessed by a coworker.  There was no suggestion of seizure per H&P.  Patient felt groggy for 5 to 10 minutes after coming to but then back to normal.  Patient admits to a lot of coffee use and decreased oral intake otherwise.  Patient notes recently she has been using more salt because of dizziness.  Patient states she was putting salt on her bagels nothing was overly seeming to help.  Patient notes she has previously been diagnosed with orthostatic hypotension but never took any medications.  Currently patient is feeling  well with no complaints.  Patient getting echo.  She denies chest pain, chest pressure, chest heaviness.  Denies any shortness of breath, palpitations currently.  Patient has no personal history of CAD.  Patient was also recently diagnosed with a murmur and was scheduled for outpatient echo.  Patient notes grandfather with history of stents.      PMH: Intermittent asthma, questionable history of orthostatic hypotension, depression    Review of Systems   Constitutional: Negative.    Eyes:  Positive for visual disturbance.   Respiratory: Negative.     Cardiovascular:  Positive for palpitations.   Musculoskeletal: Negative.    Neurological:  Positive for dizziness, syncope and light-headedness.   Hematological: Negative.    Psychiatric/Behavioral: Negative.     All other systems reviewed and are negative.    Medical History Review: I have reviewed the patient's PMH, PSH, Social History, Family History, Meds, and Allergies     Objective :  Temp:  [97.8 °F (36.6 °C)-98.3 °F (36.8 °C)] 98.3 °F (36.8 °C)  HR:  [45-82] 75  BP: (118-154)/(60-96) 121/60  Resp:  [14-22] 22  SpO2:  [97 %-100 %] 97 %  O2 Device: None (Room air)  Orthostatic Blood Pressures      Flowsheet Row Most Recent Value   Blood Pressure 121/60 filed at 05/04/2025 0915   Patient Position - Orthostatic VS Lying filed at 05/04/2025 0700          First Weight: Weight - Scale: 93.6 kg (206 lb 5.6 oz) (05/03/25 2122)  Vitals:    05/03/25 2122 05/04/25 0915   Weight: 93.6 kg (206 lb 5.6 oz) 93.4 kg (206 lb)       Physical Exam  Vitals and nursing note reviewed. Exam conducted with a chaperone present.   Constitutional:       Appearance: Normal appearance.   HENT:      Head: Normocephalic and atraumatic.   Cardiovascular:      Rate and Rhythm: Normal rate and regular rhythm.      Pulses: Normal pulses.      Heart sounds: Murmur heard.   Pulmonary:      Effort: Pulmonary effort is normal.      Breath sounds: Normal breath sounds.   Musculoskeletal:         General:  "Normal range of motion.      Cervical back: Normal range of motion and neck supple.   Skin:     General: Skin is warm and dry.   Neurological:      General: No focal deficit present.      Mental Status: She is alert and oriented to person, place, and time.   Psychiatric:         Mood and Affect: Mood normal.         Behavior: Behavior normal.         Thought Content: Thought content normal.         Judgment: Judgment normal.           Lab Results: I have reviewed the following results:  Results from last 7 days   Lab Units 05/03/25 1914 05/02/25  0930   WBC Thousand/uL 7.48 4.97   HEMOGLOBIN g/dL 12.5 12.6   HEMATOCRIT % 38.9 39.9   PLATELETS Thousands/uL 329 286     Results from last 7 days   Lab Units 05/03/25 1914 05/02/25  0930   POTASSIUM mmol/L 3.8 4.3   CHLORIDE mmol/L 107 106   CO2 mmol/L 28 30   BUN mg/dL 15 15   CREATININE mg/dL 0.80 0.86   CALCIUM mg/dL 9.3 9.4         Lab Results   Component Value Date    HGBA1C 5.3 05/02/2025     No results found for: \"CKTOTAL\", \"CKMB\", \"CKMBINDEX\", \"TROPONINI\"    Imaging Results Review: I reviewed radiology reports from this admission including: chest xray.  Other Study Results Review: EKG was reviewed.       "

## 2025-05-05 ENCOUNTER — TRANSITIONAL CARE MANAGEMENT (OUTPATIENT)
Dept: FAMILY MEDICINE CLINIC | Facility: CLINIC | Age: 35
End: 2025-05-05

## 2025-05-05 LAB
ATRIAL RATE: 81 BPM
P AXIS: 75 DEGREES
PR INTERVAL: 132 MS
QRS AXIS: 83 DEGREES
QRSD INTERVAL: 82 MS
QT INTERVAL: 382 MS
QTC INTERVAL: 443 MS
T WAVE AXIS: 68 DEGREES
VENTRICULAR RATE: 81 BPM

## 2025-05-05 PROCEDURE — 93010 ELECTROCARDIOGRAM REPORT: CPT | Performed by: INTERNAL MEDICINE

## 2025-05-05 NOTE — UTILIZATION REVIEW
NOTIFICATION OF INPATIENT ADMISSION   AUTHORIZATION REQUEST   SERVICING FACILITY:   [place] [address]  Tax ID: [id number]  NPI: [id number] ATTENDING PROVIDER:  Attending Name and NPI#: Russell Siu Md [[id number]]  Address: [address] [place] [address]  Phone: [phone]     ADMISSION INFORMATION:  Place of Service: Inpatient [facility]  Place of Service Code: 21  Inpatient Admission Date/Time: 5/3/25  8:47 PM  Discharge Date/Time: 5/4/2025  1:12 PM  Admitting Diagnosis Code/Description:  Syncope and collapse [R55]  Syncope [R55]     UTILIZATION REVIEW CONTACT:  Leida Alvarenga Utilization   Network Utilization Review Department  Phone: [phone]  Fax [phone]  Email: [email or web address]  Contact for approvals/pending authorizations, clinical reviews, and discharge.     PHYSICIAN ADVISORY SERVICES:  Medical Necessity Denial & Alaj-mr-Yh[facility] Review  Phone: [phone]  Fax: [phone]  Email: [email or web address]     DISCHARGE SUPPORT TEAM:  For Patients Discharge Needs & Updates  Phone: [phone] opt. 2 Fax: [phone]  Email: [email or web address]

## 2025-05-06 ENCOUNTER — CLINICAL SUPPORT (OUTPATIENT)
Dept: CARDIOLOGY CLINIC | Facility: CLINIC | Age: 35
End: 2025-05-06
Payer: COMMERCIAL

## 2025-05-06 DIAGNOSIS — R55 SYNCOPE AND COLLAPSE: Primary | ICD-10-CM

## 2025-05-06 PROCEDURE — 99211 OFF/OP EST MAY X REQ PHY/QHP: CPT

## 2025-05-06 NOTE — PROGRESS NOTES
Patient was in for biotel placement     Patient biotel was placed   Patient verbally understood instruction and biotel placement

## 2025-05-11 ENCOUNTER — HOSPITAL ENCOUNTER (EMERGENCY)
Facility: HOSPITAL | Age: 35
Discharge: HOME | End: 2025-05-11
Attending: EMERGENCY MEDICINE
Payer: COMMERCIAL

## 2025-05-11 VITALS
HEART RATE: 76 BPM | BODY MASS INDEX: 30.51 KG/M2 | DIASTOLIC BLOOD PRESSURE: 63 MMHG | SYSTOLIC BLOOD PRESSURE: 125 MMHG | OXYGEN SATURATION: 97 % | WEIGHT: 206 LBS | TEMPERATURE: 98.9 F | RESPIRATION RATE: 15 BRPM | HEIGHT: 69 IN

## 2025-05-11 DIAGNOSIS — R55 SYNCOPE: Primary | ICD-10-CM

## 2025-05-11 LAB
ALBUMIN SERPL BCG-MCNC: 4.5 G/DL (ref 3.5–5)
ALP SERPL-CCNC: 106 U/L (ref 34–104)
ALT SERPL W P-5'-P-CCNC: 7 U/L (ref 7–52)
ANION GAP SERPL CALCULATED.3IONS-SCNC: 8 MMOL/L (ref 4–13)
AST SERPL W P-5'-P-CCNC: 12 U/L (ref 13–39)
BASOPHILS # BLD AUTO: 0.06 THOUSANDS/ÂΜL (ref 0–0.1)
BASOPHILS NFR BLD AUTO: 1 % (ref 0–1)
BILIRUB SERPL-MCNC: 0.4 MG/DL (ref 0.2–1)
BUN SERPL-MCNC: 17 MG/DL (ref 5–25)
CALCIUM SERPL-MCNC: 9.8 MG/DL (ref 8.4–10.2)
CARDIAC TROPONIN I PNL SERPL HS: <2 NG/L (ref ?–50)
CHLORIDE SERPL-SCNC: 104 MMOL/L (ref 96–108)
CO2 SERPL-SCNC: 26 MMOL/L (ref 21–32)
CREAT SERPL-MCNC: 0.87 MG/DL (ref 0.6–1.3)
EOSINOPHIL # BLD AUTO: 0.09 THOUSAND/ÂΜL (ref 0–0.61)
EOSINOPHIL NFR BLD AUTO: 1 % (ref 0–6)
ERYTHROCYTE [DISTWIDTH] IN BLOOD BY AUTOMATED COUNT: 12.9 % (ref 11.6–15.1)
GFR SERPL CREATININE-BSD FRML MDRD: 88 ML/MIN/1.73SQ M
GLUCOSE SERPL-MCNC: 115 MG/DL (ref 65–140)
HCT VFR BLD AUTO: 37.9 % (ref 34.8–46.1)
HGB BLD-MCNC: 12.5 G/DL (ref 11.5–15.4)
IMM GRANULOCYTES # BLD AUTO: 0.01 THOUSAND/UL (ref 0–0.2)
IMM GRANULOCYTES NFR BLD AUTO: 0 % (ref 0–2)
LYMPHOCYTES # BLD AUTO: 3.26 THOUSANDS/ÂΜL (ref 0.6–4.47)
LYMPHOCYTES NFR BLD AUTO: 45 % (ref 14–44)
MCH RBC QN AUTO: 28.2 PG (ref 26.8–34.3)
MCHC RBC AUTO-ENTMCNC: 33 G/DL (ref 31.4–37.4)
MCV RBC AUTO: 86 FL (ref 82–98)
MONOCYTES # BLD AUTO: 0.44 THOUSAND/ÂΜL (ref 0.17–1.22)
MONOCYTES NFR BLD AUTO: 6 % (ref 4–12)
NEUTROPHILS # BLD AUTO: 3.41 THOUSANDS/ÂΜL (ref 1.85–7.62)
NEUTS SEG NFR BLD AUTO: 47 % (ref 43–75)
NRBC BLD AUTO-RTO: 0 /100 WBCS
PLATELET # BLD AUTO: 328 THOUSANDS/UL (ref 149–390)
PMV BLD AUTO: 9 FL (ref 8.9–12.7)
POTASSIUM SERPL-SCNC: 3.6 MMOL/L (ref 3.5–5.3)
PROT SERPL-MCNC: 7.2 G/DL (ref 6.4–8.4)
RBC # BLD AUTO: 4.43 MILLION/UL (ref 3.81–5.12)
SODIUM SERPL-SCNC: 138 MMOL/L (ref 135–147)
WBC # BLD AUTO: 7.27 THOUSAND/UL (ref 4.31–10.16)

## 2025-05-11 PROCEDURE — 84484 ASSAY OF TROPONIN QUANT: CPT | Performed by: EMERGENCY MEDICINE

## 2025-05-11 PROCEDURE — 36415 COLL VENOUS BLD VENIPUNCTURE: CPT | Performed by: EMERGENCY MEDICINE

## 2025-05-11 PROCEDURE — 93005 ELECTROCARDIOGRAM TRACING: CPT

## 2025-05-11 PROCEDURE — 99285 EMERGENCY DEPT VISIT HI MDM: CPT | Performed by: EMERGENCY MEDICINE

## 2025-05-11 PROCEDURE — 80053 COMPREHEN METABOLIC PANEL: CPT | Performed by: EMERGENCY MEDICINE

## 2025-05-11 PROCEDURE — 85025 COMPLETE CBC W/AUTO DIFF WBC: CPT | Performed by: EMERGENCY MEDICINE

## 2025-05-11 PROCEDURE — 96360 HYDRATION IV INFUSION INIT: CPT

## 2025-05-11 PROCEDURE — 99284 EMERGENCY DEPT VISIT MOD MDM: CPT

## 2025-05-11 PROCEDURE — 96361 HYDRATE IV INFUSION ADD-ON: CPT

## 2025-05-11 RX ADMIN — SODIUM CHLORIDE 1000 ML: 0.9 INJECTION, SOLUTION INTRAVENOUS at 20:20

## 2025-05-11 NOTE — Clinical Note
Isabel Mcneil was seen and treated in our emergency department on 5/11/2025.                Diagnosis:     Isabel  is off the rest of the shift today, may return to work on return date.    She may return on this date: 05/13/2025         If you have any questions or concerns, please don't hesitate to call.      Kayleigh Campos, DO    ______________________________           _______________          _______________  Hospital Representative                              Date                                Time

## 2025-05-12 LAB
ATRIAL RATE: 89 BPM
P AXIS: 54 DEGREES
PR INTERVAL: 136 MS
QRS AXIS: 48 DEGREES
QRSD INTERVAL: 86 MS
QT INTERVAL: 376 MS
QTC INTERVAL: 457 MS
T WAVE AXIS: 64 DEGREES
VENTRICULAR RATE: 89 BPM

## 2025-05-12 PROCEDURE — 93010 ELECTROCARDIOGRAM REPORT: CPT | Performed by: INTERNAL MEDICINE

## 2025-05-12 NOTE — ED PROVIDER NOTES
Time reflects when diagnosis was documented in both MDM as applicable and the Disposition within this note       Time User Action Codes Description Comment    5/11/2025  9:13 PM Kayleigh Campos Add [R55] Syncope           ED Disposition       ED Disposition   Discharge    Condition   Stable    Date/Time   Sun May 11, 2025  9:48 PM    Comment   Isabel Mcneil discharge to home/self care.                   Assessment & Plan       Medical Decision Making  34 y.o. female presents for evaluation after a syncopal episode. On exam, patient with normal vitals, in no acute distress. No focal neurologic deficits noted. Differential diagnosis includes, but is not limited to, vasovagal syncope, orthostatic hypotension, cardiac arrhythmia, acute electrolyte abnormality, anemia, pregnancy, or other acute pathology. Will evaluate with CBC, CMP, urine pregnancy test, troponin, and EKG. IV fluids given for treatment.     Amount and/or Complexity of Data Reviewed  Labs: ordered. Decision-making details documented in ED Course.        ED Course as of 05/11/25 2207   [place] May 11, 2025   2016 Procedure Note: EKG  Date/Time: 05/11/25 8:16 PM   Interpreted by: Kayleigh Campos D.O.  Indications / Diagnosis: syncope  ECG reviewed by me, the ED Provider: yes   The EKG demonstrates:  Rhythm: normal sinus with PACs  Intervals: normal intervals  Axis: normal axis  QRS/Blocks: normal QRS  ST Changes: No acute ST Changes, no STD/GUERA.   2028 CBC and differential(!)  Unremarkable.    2102 hs TnI 0hr: <2   2102 Comprehensive metabolic panel(!)  Unremarkable.    2153 On reevaluation, patient states she still feels slightly lightheaded, but improved from when she arrived.  Discussed normal test results at this time.  Patient was encouraged to follow-up with her cardiologist for further evaluation.  Patient stable for discharge at this time.  She was given symptomatic care instructions and strict ED return precautions.       Medications   sodium chloride 0.9 %  bolus 1,000 mL (0 mL Intravenous Stopped 5/11/25 2159)       ED Risk Strat Scores                    No data recorded        SBIRT 20yo+      Flowsheet Row Most Recent Value   Initial Alcohol Screen: US AUDIT-C     1. How often do you have a drink containing alcohol? 0 Filed at: 05/11/2025 1954   2. How many drinks containing alcohol do you have on a typical day you are drinking?  0 Filed at: 05/11/2025 1954   3a. Male UNDER 65: How often do you have five or more drinks on one occasion? 0 Filed at: 05/11/2025 1954   3b. FEMALE Any Age, or MALE 65+: How often do you have 4 or more drinks on one occassion? 0 Filed at: 05/11/2025 1954   Audit-C Score 0 Filed at: 05/11/2025 1954   DONY: How many times in the past year have you...    Used an illegal drug or used a prescription medication for non-medical reasons? Never Filed at: 05/11/2025 1954                            History of Present Illness       Chief Complaint   Patient presents with    Syncope     Patient BIBA for a syncopal episode @ home. Has been having them for the past 2 weeks. Is currently wearing a holter monitor. Patient stated that she was sitting and felt it coming on and got on the floor. Patient stated that it was unwitnessed and neighbor called EMS.        Past Medical History:   Diagnosis Date    Asthma     childhood asthma  inhaler prn    Autism spectrum disorder     Depression     diag with depression/anxiety age 20 -no meds now    Migraine     diag 2 yrs ago- takes extra-strength tylenol    Termination of pregnancy (fetus)     Urinary tract infection     hx of  last episode 2018    Varicella     received vaccines      Past Surgical History:   Procedure Laterality Date    NO PAST SURGERIES      WISDOM TOOTH EXTRACTION  age 22      Family History   Problem Relation Age of Onset    Asthma Mother     Bipolar disorder Mother     Post-traumatic stress disorder Mother     Diabetes Mother     Anxiety disorder Father     Depression Sister     Drug abuse  Sister     Kidney disease Sister     No Known Problems Daughter     Cancer Maternal Grandfather     No Known Problems Paternal Grandmother     No Known Problems Paternal Grandfather     Depression Maternal Uncle     Anxiety disorder Maternal Uncle     Diabetes Paternal Aunt     Breast cancer Family     Colon cancer Neg Hx     Ovarian cancer Neg Hx     Uterine cancer Neg Hx     Cervical cancer Neg Hx       Social History     Tobacco Use    Smoking status: Every Day     Current packs/day: 0.00     Average packs/day: 0.5 packs/day for 10.0 years (5.0 ttl pk-yrs)     Types: Cigarettes     Start date: 10/1/2008     Last attempt to quit: 10/1/2018     Years since quittin.6    Smokeless tobacco: Never    Tobacco comments:     smokes 5 cigarette per day   Vaping Use    Vaping status: Never Used   Substance Use Topics    Alcohol use: Not Currently     Comment: occasional-none since pregnancy    Drug use: Yes     Types: Marijuana     Comment: occasionally 2x/month      E-Cigarette/Vaping    E-Cigarette Use Never User       E-Cigarette/Vaping Substances      I have reviewed and agree with the history as documented.     34-year-old female brought in for evaluation after syncopal episode.  Patient was recently admitted for syncope, found to have orthostatic hypotension.  She currently has a 2-week heart monitor on.  She states that before passing out, she suddenly felt very weak.  She sat herself down and then passed out.  She was reportedly unresponsive for approximately 4 minutes.  The patient states that she still feels lightheaded.  She is also experiencing some palpitations currently. She denies any chest pain, shortness of breath, abdominal pain, or other concerning symptoms.         Review of Systems   Constitutional:  Negative for fever.   Cardiovascular:  Positive for palpitations. Negative for chest pain.   Gastrointestinal:  Positive for nausea. Negative for abdominal pain and vomiting.   Neurological:  Positive  for syncope and light-headedness.   All other systems reviewed and are negative.          Objective       ED Triage Vitals [05/11/25 1956]   Temperature Pulse Blood Pressure Respirations SpO2 Patient Position - Orthostatic VS   98.9 °F (37.2 °C) 87 119/91 18 100 % Lying      Temp Source Heart Rate Source BP Location FiO2 (%) Pain Score    Oral Monitor Right arm -- --      Vitals      Date and Time Temp Pulse SpO2 Resp BP Pain Score FACES Pain Rating User   05/11/25 2200 -- 76 97 % 15 125/63 -- -- TS   05/11/25 2100 -- 82 96 % 17 113/59 -- -- TS   05/11/25 1956 98.9 °F (37.2 °C) 87 100 % 18 119/91 -- -- TS            Physical Exam  Vitals and nursing note reviewed.   Constitutional:       General: She is awake. She is not in acute distress.     Appearance: She is not toxic-appearing.   HENT:      Head: Normocephalic and atraumatic.   Eyes:      General: Vision grossly intact. Gaze aligned appropriately.   Cardiovascular:      Rate and Rhythm: Normal rate and regular rhythm.      Heart sounds: Normal heart sounds.   Pulmonary:      Effort: Pulmonary effort is normal. No respiratory distress.      Breath sounds: Normal breath sounds.   Musculoskeletal:      Cervical back: Full passive range of motion without pain and neck supple.   Skin:     General: Skin is warm and dry.   Neurological:      General: No focal deficit present.      Mental Status: She is alert and oriented to person, place, and time.         Results Reviewed       Procedure Component Value Units Date/Time    HS Troponin I 4hr [[id number]]     Lab Status: No result Specimen: Blood     HS Troponin I 2hr [[id number]]     Lab Status: No result Specimen: Blood     HS Troponin 0hr (reflex protocol) [[id number]]  (Normal) Collected: 05/11/25 2006    Lab Status: Final result Specimen: Blood from Arm, Left Updated: 05/11/25 2053     hs TnI 0hr <2 ng/L     Comprehensive metabolic panel [[id number]]  (Abnormal) Collected: 05/11/25 2006    Lab Status: Final result  Specimen: Blood from Arm, Left Updated: 05/11/25 2052     Sodium 138 mmol/L      Potassium 3.6 mmol/L      Chloride 104 mmol/L      CO2 26 mmol/L      ANION GAP 8 mmol/L      BUN 17 mg/dL      Creatinine 0.87 mg/dL      Glucose 115 mg/dL      Calcium 9.8 mg/dL      AST 12 U/L      ALT 7 U/L      Alkaline Phosphatase 106 U/L      Total Protein 7.2 g/dL      Albumin 4.5 g/dL      Total Bilirubin 0.40 mg/dL      eGFR 88 ml/min/1.73sq m     Narrative:      [employer] guidelines for Chronic Kidney Disease (CKD):     Stage 1 with normal or high GFR (GFR > 90 mL/min/1.73 square meters)    Stage 2 Mild CKD (GFR = 60-89 mL/min/1.73 square meters)    Stage 3A Moderate CKD (GFR = 45-59 mL/min/1.73 square meters)    Stage 3B Moderate CKD (GFR = 30-44 mL/min/1.73 square meters)    Stage 4 Severe CKD (GFR = 15-29 mL/min/1.73 square meters)    Stage 5 End Stage CKD (GFR <15 mL/min/1.73 square meters)  Note: GFR calculation is accurate only with a steady state creatinine    CBC and differential [[id number]]  (Abnormal) Collected: 05/11/25 2006    Lab Status: Final result Specimen: Blood from Arm, Left Updated: 05/11/25 2027     WBC 7.27 Thousand/uL      RBC 4.43 Million/uL      Hemoglobin 12.5 g/dL      Hematocrit 37.9 %      MCV 86 fL      MCH 28.2 pg      MCHC 33.0 g/dL      RDW 12.9 %      MPV 9.0 fL      Platelets 328 Thousands/uL      nRBC 0 /100 WBCs      Segmented % 47 %      Immature Grans % 0 %      Lymphocytes % 45 %      Monocytes % 6 %      Eosinophils Relative 1 %      Basophils Relative 1 %      Absolute Neutrophils 3.41 Thousands/µL      Absolute Immature Grans 0.01 Thousand/uL      Absolute Lymphocytes 3.26 Thousands/µL      Absolute Monocytes 0.44 Thousand/µL      Eosinophils Absolute 0.09 Thousand/µL      Basophils Absolute 0.06 Thousands/µL     POCT pregnancy, urine [[id number]]     Lab Status: No result             No orders to display       Procedures    ED Medication and Procedure  Management   Prior to Admission Medications   Prescriptions Last Dose Informant Patient Reported? Taking?   sertraline (Zoloft) 25 mg tablet   No No   Sig: Take 1 tablet (25 mg total) by mouth daily      Facility-Administered Medications: None     Discharge Medication List as of 5/11/2025  9:49 PM        CONTINUE these medications which have NOT CHANGED    Details   sertraline (Zoloft) 25 mg tablet Take 1 tablet (25 mg total) by mouth daily, Starting Fri 5/2/2025, Normal           No discharge procedures on file.  ED SEPSIS DOCUMENTATION   Time reflects when diagnosis was documented in both MDM as applicable and the Disposition within this note       Time User Action Codes Description Comment    5/11/2025  9:13 PM Kayleigh Campos Add [R55] Syncope                  Kayleigh Campos DO  05/11/25 2648

## 2025-05-21 ENCOUNTER — RESULTS FOLLOW-UP (OUTPATIENT)
Dept: CARDIOLOGY CLINIC | Facility: CLINIC | Age: 35
End: 2025-05-21

## 2025-05-21 ENCOUNTER — CLINICAL SUPPORT (OUTPATIENT)
Dept: CARDIOLOGY CLINIC | Facility: CLINIC | Age: 35
End: 2025-05-21
Payer: COMMERCIAL

## 2025-05-21 DIAGNOSIS — R55 SYNCOPE AND COLLAPSE: ICD-10-CM

## 2025-05-21 PROCEDURE — 93272 ECG/REVIEW INTERPRET ONLY: CPT | Performed by: INTERNAL MEDICINE

## 2025-07-22 ENCOUNTER — OFFICE VISIT (OUTPATIENT)
Dept: CARDIOLOGY CLINIC | Facility: CLINIC | Age: 35
End: 2025-07-22
Payer: COMMERCIAL

## 2025-07-22 VITALS
SYSTOLIC BLOOD PRESSURE: 112 MMHG | HEART RATE: 96 BPM | RESPIRATION RATE: 16 BRPM | BODY MASS INDEX: 29.62 KG/M2 | WEIGHT: 200 LBS | OXYGEN SATURATION: 98 % | HEIGHT: 69 IN | DIASTOLIC BLOOD PRESSURE: 76 MMHG

## 2025-07-22 DIAGNOSIS — R00.2 PALPITATIONS: ICD-10-CM

## 2025-07-22 DIAGNOSIS — R42 EPISODIC LIGHTHEADEDNESS: ICD-10-CM

## 2025-07-22 DIAGNOSIS — R07.89 CHEST DISCOMFORT: ICD-10-CM

## 2025-07-22 DIAGNOSIS — R42 DIZZINESS AND GIDDINESS: ICD-10-CM

## 2025-07-22 DIAGNOSIS — R55 RECURRENT SYNCOPE: Primary | ICD-10-CM

## 2025-07-22 PROCEDURE — 99214 OFFICE O/P EST MOD 30 MIN: CPT

## 2025-07-22 NOTE — PROGRESS NOTES
CARDIOLOGY OFFICE VISIT  [facility]  [address] [place], [place] [address]  Tel: [phone]      NAME: Isabel Mcneil  AGE: 34 y.o.  SEX: female  : 1990  MRN: [id number]      Chief Complaint:  Chief Complaint   Patient presents with    New Patient Visit       Assessment and Plan:    Assessment & Plan  Recurrent syncope  She continues to endorse episodes of lightheadedness which frequently progressed to syncope.  These symptoms tend to be associated with positional changes and standing.  Recent echocardiogram and event monitor overall unremarkable.  She did experience a syncopal episode while wearing her event monitor, with no significant dysrhythmias noted.  Recent K & mag WNL.  Hemoglobin WNL.  Orthostatic vitals negative today, however she has previous been diagnosed with orthostatic hypotension  Tilt table test ordered  Nonpharmacologic measures for possible orthostatic hypotension and POTS were reviewed.  She was advised to wear either pantyhose compression stockings versus thigh-high compression stockings with biker shorts, as well as abdominal binder.  [place] sodium intake was advised, as well as taking positional changes slowly.  She was advised to stay well-hydrated.  She was advised to begin exercises to improve lower extremity and core strength, such as swimming, squatting with dumbbell, planks, and exercise bike program.  She was advised to reach out if she continues to experience symptoms despite these measures for consideration of medication such as Florinef.  Echo exercise stress test ordered    Episodic lightheadedness  Plan per above    Palpitations  Recent event monitor unremarkable aside from occasional PACs and PVCs.  Plan for tilt table test as noted above  Continue to monitor    Chest discomfort  Echo exercise stress test ordered.           Follow-up: 3 months or sooner as needed.   All questions and  concerns addressed.   Patient was advised to notify our office with onset of new or worsening cardiac symptoms.    1. Recurrent syncope  POCT ECG    Echo stress test, exercise    Tilt table    Abdominal binder    Elastic Bandages & Supports (Medical Compression Thigh High) MISC      2. Episodic lightheadedness  Tilt table    Abdominal binder    Elastic Bandages & Supports (Medical Compression Thigh High) MISC      3. Palpitations        4. Chest discomfort  Echo stress test, exercise      5. Dizziness and giddiness  Ambulatory Referral to Cardiology          History of Present Illness:     Isabel Mcneil is a 34 y.o. female with PMHx of asthma, possible history of orthostatic hypotension who presents for hospital follow-up.    She was hospitalized 5/2025 for evaluation of multiple syncopal episodes.  She reported she was standing when she experienced palpitations, tunnel vision, lightheadedness, and subsequently syncopized.  She noted drinking a lot of coffee and decreased p.o. intake.  She also noticed increased salt use, without improvement.  She notes she has been diagnosed with orthostatic hypotension but never placed on medications.  Her syncope was suspected to be related to orthostasis/volume depletion, and nonpharmacological measures were discussed.  Initiation of Florinef was offered, however she declined at that time.  Echocardiogram and event monitor unrevealing.  Since this hospitalization, she was seen in the ED again for a syncopal episode.  Troponin negative x 1.  She was reportedly wearing the event monitor when this episode happened.  EKG in the ED demonstrated sinus rhythm with PACs.    She reports has been experiencing palpitations, as a sensation of heart racing or flipping, which occur 3-4 times daily and last for about 3 to 4 minutes.  She notes no specific provoking factors, but does note that these episodes improved with sitting and resting.  She separately notes episodes of lightheadedness  which progressed to vision changes, and subsequent syncope.  She reports these episodes have been ongoing for the past 6 years and have progressively worsened.  She notes the symptoms typically occur with standing/positional changes, and occasionally with turning her head too quickly.  She has now started to occasionally note these episodes when she is sitting, but predominantly they tend to be positional in nature.  She reports that since her hospitalization, she has had 3 syncopal episodes.  These lightheadedness episodes do not always progress to syncope, but frequently do.    Prior to her hospitalization, she experienced 5 syncopal episodes over the span of about a week.  She reports that at that time she was doing a lot of coffee and not staying well-hydrated.  Since then, she has cut back on her coffee significantly and is staying much more well-hydrated.  She has started trying compression stockings and increasing her sodium intake, and has not yet noticed significant symptomatic improvement.  She also takes positional changes slowly without symptomatic improvement.  She denies nausea, vomiting, diarrhea.  She does note chest discomfort which has been virtually constant and ongoing for the past several months.  She does note her grandfather had stents placed, but denies family history of premature CAD.  She reports the symptoms feel different than her GERD.  She has not specifically noticed worsening with exertion.  She denies dyspnea.    Family History: Grandfather with history of stents    Recent Cardiac Work-Up:  Echocardiogram 5/4/2025: EF 60%  Event monitor 5/2025: Sinus rhythm with average heart rate of 82 bpm.  Her symptoms correlated with sinus rhythm or sinus tachycardia.  She reports she did experience a syncopal episode while wearing her event monitor.      Review of Systems:   Review of Systems   Constitutional:  Negative for diaphoresis, fever and unexpected weight change.   HENT:  Negative for  "ear pain and sore throat.    Eyes:  Negative for pain and redness.   Respiratory:  Negative for cough and shortness of breath.    Cardiovascular:  Positive for chest pain and palpitations. Negative for leg swelling.   Gastrointestinal:  Negative for abdominal pain and vomiting.   Genitourinary:  Negative for dysuria.   Skin:  Negative for color change and rash.   Neurological:  Positive for syncope and light-headedness.   Hematological:  Does not bruise/bleed easily.   Psychiatric/Behavioral:  Negative for agitation and behavioral problems.    All other systems reviewed and are negative.        Vitals:  Vitals:    07/22/25 1549 07/22/25 1600 07/22/25 1609   BP: 112/72 112/78 112/76   BP Location: Left arm Left arm Left arm   Patient Position: Supine Sitting Standing   Cuff Size: Large Large Large   Pulse: 85 92 96   Resp: 16     SpO2: 97% 97% 98%   Weight: 90.7 kg (200 lb)     Height: 5' 9\" (1.753 m)          Body mass index is 29.53 kg/m².    Weight (last 2 days)       Date/Time Weight    07/22/25 1549 90.7 (200)              Physical Exam:   GEN: Alert and oriented x 3, in no acute distress.  Well appearing and well nourished.   HEENT: Sclera anicteric, conjunctivae pink, mucous membranes moist. Oropharynx clear.   NECK: Supple, no carotid bruits, no significant JVD. Trachea midline.   HEART: Regular rhythm, normal S1 and S2, no murmurs, clicks, gallops or rubs. PMI nondisplaced, no thrills.   LUNGS: Clear to auscultation bilaterally; no wheezes, rales, or rhonchi. No increased work of breathing or signs of respiratory distress.   ABDOMEN: Soft, nontender, nondistended.   EXTREMITIES: Skin warm and well perfused, no clubbing, cyanosis, or edema.  NEURO: No focal findings. Normal speech. Mood and affect normal.   SKIN: Normal without suspicious lesions on exposed skin.      EKG Reviewed Personally: 7/22/2025: Sinus rhythm with sinus arrhythmia and occasional PVCs    The ASCVD Risk score (Ev DK, et al., 2019) " "failed to calculate for the following reasons:    The 2019 ASCVD risk score is only valid for ages 40 to 79    Active Problems:  Problem List[1]    Past Medical History:  Past Medical History[2]      Past Surgical History:  Past Surgical History[3]      Family History:  Family History[4]      Social History:  Social History[5]        The following portions of the patient's history were reviewed and updated as appropriate: past medical history, past surgical history, past family history,  past social history, current medications, allergies and problem list.        Laboratory Results:  CBC with diff:   Lab Results   Component Value Date    WBC 7.27 05/11/2025    RBC 4.43 05/11/2025    HGB 12.5 05/11/2025    HCT 37.9 05/11/2025    MCV 86 05/11/2025    MCH 28.2 05/11/2025    RDW 12.9 05/11/2025     05/11/2025       CMP:  Lab Results   Component Value Date    CREATININE 0.87 05/11/2025    BUN 17 05/11/2025    K 3.6 05/11/2025     05/11/2025    CO2 26 05/11/2025    ALKPHOS 106 (H) 05/11/2025    ALT 7 05/11/2025    AST 12 (L) 05/11/2025    BILIDIR 0.08 05/03/2025       Lab Results   Component Value Date    HGBA1C 5.3 05/02/2025    MG 2.0 05/03/2025       No results found for: \"TROPONINI\", \"CKMB\", \"CKTOTAL\"    Lipid Profile:   No results found for: \"CHOL\"  Lab Results   Component Value Date    HDL 86 05/02/2025    HDL 70 05/24/2022     Lab Results   Component Value Date    LDLCALC 90 05/02/2025    LDLCALC 81 05/24/2022     Lab Results   Component Value Date    TRIG 54 05/02/2025    TRIG 88 05/24/2022       Cardiac testing:   Results for orders placed during the hospital encounter of 05/03/25    Echo complete w/ contrast if indicated    Interpretation Summary    Left Ventricle: Left ventricular cavity size is normal. Wall thickness is normal. The left ventricular ejection fraction is 60%. Systolic function is normal. Wall motion is normal. Diastolic function is normal.    No results found for this or any " previous visit.    No results found for this or any previous visit.    No results found for this or any previous visit.    No results found for this or any previous visit.    No results found for this or any previous visit.    No results found for this or any previous visit.    No results found for this or any previous visit.        Medications:  Current Medications[6]      Allergies:  Allergies[7]        Recommend aggressive risk factor modification and therapeutic lifestyle changes.  Low-salt, low-calorie, low-fat, low-cholesterol diet with regular exercise and to optimize weight.    Discussed concepts of cardiovascular disease, including signs and symptoms of heart disease.    Previous studies were reviewed.    Safety measures were reviewed.  Questions were entertained and answered.  Patient was advised to report any problems requiring medical attention.    Follow-up with PCP and appropriate specialist and lab work/testing as discussed.    Return for follow up visit as scheduled or earlier, if needed.  Thank you for allowing me to participate in the care and evaluation of your patient.  Should you have any questions, please feel free to contact me.    Kirti Long PA-C  7/22/2025,5:31 PM      ** Please Note: Fluency DirectDictation voice to text software may have been used in the creation of this document. **         [1]   Patient Active Problem List  Diagnosis    Mild intermittent asthma without complication    Current moderate episode of major depressive disorder without prior episode (HCC)    High-functioning autism spectrum disorder    H/O major depression    Orthostatic hypotension    Raynaud's phenomenon without gangrene    Obesity affecting pregnancy in third trimester    GBS bacteriuria    History of gestational hypertension    Dog bite    Strain of back    Syncope and collapse    Episodic lightheadedness    Recurrent syncope   [2]   Past Medical History:  Diagnosis Date    Asthma     childhood  asthma  inhaler prn    Autism spectrum disorder     Depression     diag with depression/anxiety age 20 -no meds now    Migraine     diag 2 yrs ago- takes extra-strength tylenol    Termination of pregnancy (fetus)     Urinary tract infection     hx of  last episode     Varicella     received vaccines   [3]   Past Surgical History:  Procedure Laterality Date    NO PAST SURGERIES      WISDOM TOOTH EXTRACTION  age 22   [4]   Family History  Problem Relation Name Age of Onset    Asthma Mother Mom     Bipolar disorder Mother Mom     Post-traumatic stress disorder Mother Mom     Diabetes Mother Mom     Anxiety disorder Father      Depression Sister 2 half     Drug abuse Sister 2 half     Kidney disease Sister      No Known Problems Daughter      Cancer Maternal Grandfather Pop     No Known Problems Paternal Grandmother      No Known Problems Paternal Grandfather      Depression Maternal Uncle      Anxiety disorder Maternal Uncle      Diabetes Paternal Aunt      Breast cancer Family Paternal Greatgrandma     Colon cancer Neg Hx      Ovarian cancer Neg Hx      Uterine cancer Neg Hx      Cervical cancer Neg Hx     [5]   Social History  Socioeconomic History    Marital status: Single   Tobacco Use    Smoking status: Every Day     Current packs/day: 0.00     Average packs/day: 0.5 packs/day for 10.0 years (5.0 ttl pk-yrs)     Types: Cigarettes     Start date: 10/1/2008     Last attempt to quit: 10/1/2018     Years since quittin.8    Smokeless tobacco: Never    Tobacco comments:     smokes 5 cigarette per day   Vaping Use    Vaping status: Never Used   Substance and Sexual Activity    Alcohol use: Not Currently     Comment: occasional-none since pregnancy    Drug use: Yes     Types: Marijuana     Comment: occasionally 2x/month    Sexual activity: Not Currently     Partners: Male     Birth control/protection: Male Sterilization     Social Drivers of Health     Food Insecurity: No Food Insecurity (5/3/2025)    Nursing -  Inadequate Food Risk Classification     Worried About Running Out of Food in the Last Year: Never true     Ran Out of Food in the Last Year: Never true     Ran Out of Food in the Last Year: Never true   Transportation Needs: No Transportation Needs (5/3/2025)    Nursing - Transportation Risk Classification     Lack of Transportation: No   Intimate Partner Violence: Unknown (5/3/2025)    Nursing IPS     Physically Hurt by Someone: No     Hurt or Threatened by Someone: No   Housing Stability: Unknown (5/3/2025)    Nursing: Inadequate Housing Risk Classification     Unable to Pay for Housing in the Last Year: No     Has Housing: No   [6]   Current Outpatient Medications:     Elastic Bandages & Supports (Medical Compression Thigh High) MISC, Use in the morning, Disp: 1 each, Rfl: 0    sertraline (Zoloft) 25 mg tablet, Take 1 tablet (25 mg total) by mouth daily, Disp: 30 tablet, Rfl: 2  [7] No Known Allergies

## 2025-07-22 NOTE — ASSESSMENT & PLAN NOTE
She continues to endorse episodes of lightheadedness which frequently progressed to syncope.  These symptoms tend to be associated with positional changes and standing.  Recent echocardiogram and event monitor overall unremarkable.  She did experience a syncopal episode while wearing her event monitor, with no significant dysrhythmias noted.  Recent K & mag WNL.  Hemoglobin WNL.  Orthostatic vitals negative today, however she has previous been diagnosed with orthostatic hypotension  Tilt table test ordered  Nonpharmacologic measures for possible orthostatic hypotension and POTS were reviewed.  She was advised to wear either pantyhose compression stockings versus thigh-high compression stockings with biker shorts, as well as abdominal binder.  [place] sodium intake was advised, as well as taking positional changes slowly.  She was advised to stay well-hydrated.  She was advised to begin exercises to improve lower extremity and core strength, such as swimming, squatting with dumbbell, planks, and exercise bike program.  She was advised to reach out if she continues to experience symptoms despite these measures for consideration of medication such as Florinef.  Echo exercise stress test ordered

## 2025-07-23 ENCOUNTER — TELEPHONE (OUTPATIENT)
Age: 35
End: 2025-07-23

## 2025-07-23 DIAGNOSIS — R55 RECURRENT SYNCOPE: Primary | ICD-10-CM

## 2025-07-23 DIAGNOSIS — R42 EPISODIC LIGHTHEADEDNESS: ICD-10-CM

## 2025-07-23 PROCEDURE — 93000 ELECTROCARDIOGRAM COMPLETE: CPT

## 2025-07-23 NOTE — TELEPHONE ENCOUNTER
[facility] pharmacy called stating that they received an order for this patient for compression stockings however they do not supply compression stockings.    Please follow up with pt.